# Patient Record
Sex: MALE | Race: WHITE | URBAN - METROPOLITAN AREA
[De-identification: names, ages, dates, MRNs, and addresses within clinical notes are randomized per-mention and may not be internally consistent; named-entity substitution may affect disease eponyms.]

---

## 2018-06-27 ENCOUNTER — EMERGENCY (EMERGENCY)
Facility: HOSPITAL | Age: 43
LOS: 1 days | Discharge: HOME | End: 2018-06-27
Attending: EMERGENCY MEDICINE | Admitting: EMERGENCY MEDICINE
Payer: COMMERCIAL

## 2018-06-27 VITALS
RESPIRATION RATE: 14 BRPM | OXYGEN SATURATION: 99 % | HEART RATE: 76 BPM | HEIGHT: 70 IN | WEIGHT: 179.9 LBS | SYSTOLIC BLOOD PRESSURE: 140 MMHG | TEMPERATURE: 98 F | DIASTOLIC BLOOD PRESSURE: 89 MMHG

## 2018-06-27 VITALS
HEART RATE: 75 BPM | RESPIRATION RATE: 16 BRPM | SYSTOLIC BLOOD PRESSURE: 109 MMHG | DIASTOLIC BLOOD PRESSURE: 65 MMHG | TEMPERATURE: 98 F | OXYGEN SATURATION: 97 %

## 2018-06-27 LAB
ALBUMIN SERPL ELPH-MCNC: 3.9 G/DL — SIGNIFICANT CHANGE UP (ref 3.3–5)
ALP SERPL-CCNC: 97 U/L — SIGNIFICANT CHANGE UP (ref 30–120)
ALT FLD-CCNC: 36 U/L DA — SIGNIFICANT CHANGE UP (ref 10–60)
ANION GAP SERPL CALC-SCNC: 11 MMOL/L — SIGNIFICANT CHANGE UP (ref 5–17)
APPEARANCE UR: CLEAR — SIGNIFICANT CHANGE UP
AST SERPL-CCNC: 21 U/L — SIGNIFICANT CHANGE UP (ref 10–40)
BASOPHILS # BLD AUTO: 0.03 K/UL — SIGNIFICANT CHANGE UP (ref 0–0.2)
BASOPHILS NFR BLD AUTO: 0.6 % — SIGNIFICANT CHANGE UP (ref 0–2)
BILIRUB SERPL-MCNC: 0.4 MG/DL — SIGNIFICANT CHANGE UP (ref 0.2–1.2)
BILIRUB UR-MCNC: NEGATIVE — SIGNIFICANT CHANGE UP
BUN SERPL-MCNC: 8 MG/DL — SIGNIFICANT CHANGE UP (ref 7–23)
CALCIUM SERPL-MCNC: 9.4 MG/DL — SIGNIFICANT CHANGE UP (ref 8.4–10.5)
CHLORIDE SERPL-SCNC: 101 MMOL/L — SIGNIFICANT CHANGE UP (ref 96–108)
CO2 SERPL-SCNC: 27 MMOL/L — SIGNIFICANT CHANGE UP (ref 22–31)
COLOR SPEC: YELLOW — SIGNIFICANT CHANGE UP
CREAT SERPL-MCNC: 1.2 MG/DL — SIGNIFICANT CHANGE UP (ref 0.5–1.3)
DIFF PNL FLD: ABNORMAL
EOSINOPHIL # BLD AUTO: 0.03 K/UL — SIGNIFICANT CHANGE UP (ref 0–0.5)
EOSINOPHIL NFR BLD AUTO: 0.6 % — SIGNIFICANT CHANGE UP (ref 0–6)
GLUCOSE SERPL-MCNC: 107 MG/DL — HIGH (ref 70–99)
GLUCOSE UR QL: NEGATIVE MG/DL — SIGNIFICANT CHANGE UP
HCT VFR BLD CALC: 41.8 % — SIGNIFICANT CHANGE UP (ref 39–50)
HGB BLD-MCNC: 14.9 G/DL — SIGNIFICANT CHANGE UP (ref 13–17)
IMM GRANULOCYTES NFR BLD AUTO: 0.2 % — SIGNIFICANT CHANGE UP (ref 0–1.5)
KETONES UR-MCNC: ABNORMAL
LEUKOCYTE ESTERASE UR-ACNC: ABNORMAL
LYMPHOCYTES # BLD AUTO: 1.02 K/UL — SIGNIFICANT CHANGE UP (ref 1–3.3)
LYMPHOCYTES # BLD AUTO: 18.8 % — SIGNIFICANT CHANGE UP (ref 13–44)
MCHC RBC-ENTMCNC: 29 PG — SIGNIFICANT CHANGE UP (ref 27–34)
MCHC RBC-ENTMCNC: 35.6 GM/DL — SIGNIFICANT CHANGE UP (ref 32–36)
MCV RBC AUTO: 81.5 FL — SIGNIFICANT CHANGE UP (ref 80–100)
MONOCYTES # BLD AUTO: 0.48 K/UL — SIGNIFICANT CHANGE UP (ref 0–0.9)
MONOCYTES NFR BLD AUTO: 8.8 % — SIGNIFICANT CHANGE UP (ref 2–14)
NEUTROPHILS # BLD AUTO: 3.86 K/UL — SIGNIFICANT CHANGE UP (ref 1.8–7.4)
NEUTROPHILS NFR BLD AUTO: 71 % — SIGNIFICANT CHANGE UP (ref 43–77)
NITRITE UR-MCNC: NEGATIVE — SIGNIFICANT CHANGE UP
PH UR: 6 — SIGNIFICANT CHANGE UP (ref 5–8)
PLATELET # BLD AUTO: 217 K/UL — SIGNIFICANT CHANGE UP (ref 150–400)
POTASSIUM SERPL-MCNC: 3.8 MMOL/L — SIGNIFICANT CHANGE UP (ref 3.5–5.3)
POTASSIUM SERPL-SCNC: 3.8 MMOL/L — SIGNIFICANT CHANGE UP (ref 3.5–5.3)
PROT SERPL-MCNC: 7.6 G/DL — SIGNIFICANT CHANGE UP (ref 6–8.3)
PROT UR-MCNC: 30 MG/DL
RBC # BLD: 5.13 M/UL — SIGNIFICANT CHANGE UP (ref 4.2–5.8)
RBC # FLD: 12 % — SIGNIFICANT CHANGE UP (ref 10.3–14.5)
SODIUM SERPL-SCNC: 139 MMOL/L — SIGNIFICANT CHANGE UP (ref 135–145)
SP GR SPEC: 1.02 — SIGNIFICANT CHANGE UP (ref 1.01–1.02)
UROBILINOGEN FLD QL: 1 MG/DL
WBC # BLD: 5.43 K/UL — SIGNIFICANT CHANGE UP (ref 3.8–10.5)
WBC # FLD AUTO: 5.43 K/UL — SIGNIFICANT CHANGE UP (ref 3.8–10.5)

## 2018-06-27 PROCEDURE — 87086 URINE CULTURE/COLONY COUNT: CPT

## 2018-06-27 PROCEDURE — 80053 COMPREHEN METABOLIC PANEL: CPT

## 2018-06-27 PROCEDURE — 81001 URINALYSIS AUTO W/SCOPE: CPT

## 2018-06-27 PROCEDURE — 36415 COLL VENOUS BLD VENIPUNCTURE: CPT

## 2018-06-27 PROCEDURE — 85027 COMPLETE CBC AUTOMATED: CPT

## 2018-06-27 PROCEDURE — 99284 EMERGENCY DEPT VISIT MOD MDM: CPT

## 2018-06-27 PROCEDURE — 99283 EMERGENCY DEPT VISIT LOW MDM: CPT

## 2018-06-27 RX ORDER — CEFUROXIME AXETIL 250 MG
500 TABLET ORAL EVERY 12 HOURS
Qty: 0 | Refills: 0 | Status: DISCONTINUED | OUTPATIENT
Start: 2018-06-27 | End: 2018-06-27

## 2018-06-27 RX ORDER — AZTREONAM 2 G
1 VIAL (EA) INJECTION
Qty: 20 | Refills: 0
Start: 2018-06-27 | End: 2018-07-06

## 2018-06-27 NOTE — ED PROVIDER NOTE - CARE PLAN
Principal Discharge DX:	Urinary retention Principal Discharge DX:	Urinary retention  Secondary Diagnosis:	UTI (urinary tract infection)

## 2018-06-27 NOTE — ED PROVIDER NOTE - PROGRESS NOTE DETAILS
spoke with pt about results, pt is not sure if he wants to go home with catheter, understands he may go into retention and need a billy later tonite, pt to be d/c home with bactrim follow up with urology and return if any sytmposm wrosen

## 2018-06-27 NOTE — ED PROVIDER NOTE - OBJECTIVE STATEMENT
41yo male who presents with urinary retention today. pt states since 10am he has been having difficulty urinating and burning, no hematuria, no vomiting or back pain

## 2018-06-28 ENCOUNTER — EMERGENCY (EMERGENCY)
Facility: HOSPITAL | Age: 43
LOS: 1 days | Discharge: HOME | End: 2018-06-28
Attending: EMERGENCY MEDICINE | Admitting: EMERGENCY MEDICINE
Payer: COMMERCIAL

## 2018-06-28 VITALS
WEIGHT: 179.9 LBS | HEART RATE: 94 BPM | DIASTOLIC BLOOD PRESSURE: 84 MMHG | HEIGHT: 70 IN | TEMPERATURE: 100 F | OXYGEN SATURATION: 97 % | SYSTOLIC BLOOD PRESSURE: 140 MMHG | RESPIRATION RATE: 16 BRPM

## 2018-06-28 PROCEDURE — 99283 EMERGENCY DEPT VISIT LOW MDM: CPT

## 2018-06-28 NOTE — ED ADULT NURSE NOTE - CHPI ED SYMPTOMS NEG
no nausea/no vomiting/no fever/no diarrhea/no burning urination/no blood in stool/no dysuria/no chills/no abdominal distension

## 2018-06-28 NOTE — ED PROVIDER NOTE - NS_ ATTENDINGSCRIBEDETAILS _ED_A_ED_FT
Sly Knox MD - The scribe's documentation has been prepared under my direction and personally reviewed by me in its entirety. I confirm that the note above accurately reflects all work, treatment, procedures, and medical decision making performed by me.

## 2018-06-28 NOTE — ED PROVIDER NOTE - OBJECTIVE STATEMENT
43 y/o M pt w/ PMHx bipolar 1 disorder presents to the ED c/o blood at site of catheter x today. Pt states that the catheter has been draining normally, just concerned because he noticed blood at entrance of the catheter and on the outside of the catheter. Pt denies any blood in the urine. States he went to urgent care yesterday for urinary retention, was unable to urinate at urgent care and was advised to come to [facility] for catheter insertion. Pt states he has not had any recent change in medication. Pt denies fever, n/v, pain/discomfort or any other complaints at this time.

## 2018-06-28 NOTE — ED PROVIDER NOTE - GENITOURINARY, MLM
No inguinal lymphadenopathy. No erythema or swelling. Minimal blood around billy catheter distal to tip of penis

## 2018-06-29 LAB
CULTURE RESULTS: NO GROWTH — SIGNIFICANT CHANGE UP
SPECIMEN SOURCE: SIGNIFICANT CHANGE UP

## 2018-07-03 ENCOUNTER — OUTPATIENT (OUTPATIENT)
Dept: OUTPATIENT SERVICES | Facility: HOSPITAL | Age: 43
LOS: 1 days | End: 2018-07-03
Payer: COMMERCIAL

## 2018-07-03 ENCOUNTER — APPOINTMENT (OUTPATIENT)
Dept: ULTRASOUND IMAGING | Facility: CLINIC | Age: 43
End: 2018-07-03
Payer: COMMERCIAL

## 2018-07-03 DIAGNOSIS — Z00.8 ENCOUNTER FOR OTHER GENERAL EXAMINATION: ICD-10-CM

## 2018-07-03 PROCEDURE — 76770 US EXAM ABDO BACK WALL COMP: CPT

## 2018-07-03 PROCEDURE — 76770 US EXAM ABDO BACK WALL COMP: CPT | Mod: 26

## 2019-05-17 ENCOUNTER — APPOINTMENT (OUTPATIENT)
Dept: MRI IMAGING | Facility: CLINIC | Age: 44
End: 2019-05-17
Payer: COMMERCIAL

## 2019-05-17 ENCOUNTER — OUTPATIENT (OUTPATIENT)
Dept: OUTPATIENT SERVICES | Facility: HOSPITAL | Age: 44
LOS: 1 days | End: 2019-05-17
Payer: COMMERCIAL

## 2019-05-17 DIAGNOSIS — M75.41 IMPINGEMENT SYNDROME OF RIGHT SHOULDER: ICD-10-CM

## 2019-05-17 PROBLEM — F31.9 BIPOLAR DISORDER, UNSPECIFIED: Chronic | Status: ACTIVE | Noted: 2018-06-27

## 2019-05-17 PROCEDURE — 73221 MRI JOINT UPR EXTREM W/O DYE: CPT

## 2019-05-17 PROCEDURE — 73221 MRI JOINT UPR EXTREM W/O DYE: CPT | Mod: 26,RT

## 2019-10-21 NOTE — ED ADULT NURSE NOTE - CARDIO WDL
Detail Level: Simple Detail Level: Detailed Normal rate, regular rhythm, normal S1, S2 heart sounds heard.

## 2019-11-04 ENCOUNTER — EMERGENCY (EMERGENCY)
Facility: HOSPITAL | Age: 44
LOS: 1 days | Discharge: HOME | End: 2019-11-04
Attending: EMERGENCY MEDICINE | Admitting: EMERGENCY MEDICINE
Payer: COMMERCIAL

## 2019-11-04 VITALS
HEART RATE: 104 BPM | OXYGEN SATURATION: 98 % | HEIGHT: 70 IN | DIASTOLIC BLOOD PRESSURE: 87 MMHG | WEIGHT: 184.09 LBS | TEMPERATURE: 99 F | RESPIRATION RATE: 16 BRPM | SYSTOLIC BLOOD PRESSURE: 165 MMHG

## 2019-11-04 VITALS
RESPIRATION RATE: 14 BRPM | OXYGEN SATURATION: 98 % | SYSTOLIC BLOOD PRESSURE: 146 MMHG | TEMPERATURE: 98 F | HEART RATE: 84 BPM | DIASTOLIC BLOOD PRESSURE: 91 MMHG

## 2019-11-04 LAB
ALBUMIN SERPL ELPH-MCNC: 4.3 G/DL — SIGNIFICANT CHANGE UP (ref 3.3–5)
ALP SERPL-CCNC: 88 U/L — SIGNIFICANT CHANGE UP (ref 30–120)
ALT FLD-CCNC: 31 U/L DA — SIGNIFICANT CHANGE UP (ref 10–60)
ANION GAP SERPL CALC-SCNC: 8 MMOL/L — SIGNIFICANT CHANGE UP (ref 5–17)
APPEARANCE UR: CLEAR — SIGNIFICANT CHANGE UP
APPEARANCE UR: CLEAR — SIGNIFICANT CHANGE UP
AST SERPL-CCNC: 17 U/L — SIGNIFICANT CHANGE UP (ref 10–40)
BASOPHILS # BLD AUTO: 0.02 K/UL — SIGNIFICANT CHANGE UP (ref 0–0.2)
BASOPHILS NFR BLD AUTO: 0.4 % — SIGNIFICANT CHANGE UP (ref 0–2)
BILIRUB SERPL-MCNC: 0.6 MG/DL — SIGNIFICANT CHANGE UP (ref 0.2–1.2)
BILIRUB UR-MCNC: NEGATIVE — SIGNIFICANT CHANGE UP
BILIRUB UR-MCNC: NEGATIVE — SIGNIFICANT CHANGE UP
BUN SERPL-MCNC: 10 MG/DL — SIGNIFICANT CHANGE UP (ref 7–23)
CALCIUM SERPL-MCNC: 9.6 MG/DL — SIGNIFICANT CHANGE UP (ref 8.4–10.5)
CHLORIDE SERPL-SCNC: 104 MMOL/L — SIGNIFICANT CHANGE UP (ref 96–108)
CO2 SERPL-SCNC: 27 MMOL/L — SIGNIFICANT CHANGE UP (ref 22–31)
COLOR SPEC: YELLOW — SIGNIFICANT CHANGE UP
COLOR SPEC: YELLOW — SIGNIFICANT CHANGE UP
CREAT SERPL-MCNC: 1.34 MG/DL — HIGH (ref 0.5–1.3)
DIFF PNL FLD: ABNORMAL
DIFF PNL FLD: NEGATIVE — SIGNIFICANT CHANGE UP
EOSINOPHIL # BLD AUTO: 0.01 K/UL — SIGNIFICANT CHANGE UP (ref 0–0.5)
EOSINOPHIL NFR BLD AUTO: 0.2 % — SIGNIFICANT CHANGE UP (ref 0–6)
GLUCOSE SERPL-MCNC: 99 MG/DL — SIGNIFICANT CHANGE UP (ref 70–99)
GLUCOSE UR QL: NEGATIVE MG/DL — SIGNIFICANT CHANGE UP
GLUCOSE UR QL: NEGATIVE MG/DL — SIGNIFICANT CHANGE UP
HCT VFR BLD CALC: 45.3 % — SIGNIFICANT CHANGE UP (ref 39–50)
HGB BLD-MCNC: 16.2 G/DL — SIGNIFICANT CHANGE UP (ref 13–17)
IMM GRANULOCYTES NFR BLD AUTO: 0.2 % — SIGNIFICANT CHANGE UP (ref 0–1.5)
KETONES UR-MCNC: ABNORMAL
KETONES UR-MCNC: ABNORMAL
LEUKOCYTE ESTERASE UR-ACNC: NEGATIVE — SIGNIFICANT CHANGE UP
LEUKOCYTE ESTERASE UR-ACNC: NEGATIVE — SIGNIFICANT CHANGE UP
LIDOCAIN IGE QN: 111 U/L — SIGNIFICANT CHANGE UP (ref 73–393)
LYMPHOCYTES # BLD AUTO: 0.79 K/UL — LOW (ref 1–3.3)
LYMPHOCYTES # BLD AUTO: 14.2 % — SIGNIFICANT CHANGE UP (ref 13–44)
MCHC RBC-ENTMCNC: 29.4 PG — SIGNIFICANT CHANGE UP (ref 27–34)
MCHC RBC-ENTMCNC: 35.8 GM/DL — SIGNIFICANT CHANGE UP (ref 32–36)
MCV RBC AUTO: 82.2 FL — SIGNIFICANT CHANGE UP (ref 80–100)
MONOCYTES # BLD AUTO: 0.58 K/UL — SIGNIFICANT CHANGE UP (ref 0–0.9)
MONOCYTES NFR BLD AUTO: 10.4 % — SIGNIFICANT CHANGE UP (ref 2–14)
NEUTROPHILS # BLD AUTO: 4.16 K/UL — SIGNIFICANT CHANGE UP (ref 1.8–7.4)
NEUTROPHILS NFR BLD AUTO: 74.6 % — SIGNIFICANT CHANGE UP (ref 43–77)
NITRITE UR-MCNC: NEGATIVE — SIGNIFICANT CHANGE UP
NITRITE UR-MCNC: NEGATIVE — SIGNIFICANT CHANGE UP
NRBC # BLD: 0 /100 WBCS — SIGNIFICANT CHANGE UP (ref 0–0)
PH UR: 8 — SIGNIFICANT CHANGE UP (ref 5–8)
PH UR: 9 — HIGH (ref 5–8)
PLATELET # BLD AUTO: 258 K/UL — SIGNIFICANT CHANGE UP (ref 150–400)
POTASSIUM SERPL-MCNC: 3.8 MMOL/L — SIGNIFICANT CHANGE UP (ref 3.5–5.3)
POTASSIUM SERPL-SCNC: 3.8 MMOL/L — SIGNIFICANT CHANGE UP (ref 3.5–5.3)
PROT SERPL-MCNC: 8.1 G/DL — SIGNIFICANT CHANGE UP (ref 6–8.3)
PROT UR-MCNC: 15 MG/DL
PROT UR-MCNC: 30 MG/DL
RBC # BLD: 5.51 M/UL — SIGNIFICANT CHANGE UP (ref 4.2–5.8)
RBC # FLD: 12.1 % — SIGNIFICANT CHANGE UP (ref 10.3–14.5)
SODIUM SERPL-SCNC: 139 MMOL/L — SIGNIFICANT CHANGE UP (ref 135–145)
SP GR SPEC: 1.01 — SIGNIFICANT CHANGE UP (ref 1.01–1.02)
SP GR SPEC: 1.01 — SIGNIFICANT CHANGE UP (ref 1.01–1.02)
UROBILINOGEN FLD QL: NEGATIVE MG/DL — SIGNIFICANT CHANGE UP
UROBILINOGEN FLD QL: NEGATIVE MG/DL — SIGNIFICANT CHANGE UP
WBC # BLD: 5.57 K/UL — SIGNIFICANT CHANGE UP (ref 3.8–10.5)
WBC # FLD AUTO: 5.57 K/UL — SIGNIFICANT CHANGE UP (ref 3.8–10.5)

## 2019-11-04 PROCEDURE — 81001 URINALYSIS AUTO W/SCOPE: CPT

## 2019-11-04 PROCEDURE — 99284 EMERGENCY DEPT VISIT MOD MDM: CPT | Mod: 25

## 2019-11-04 PROCEDURE — 96360 HYDRATION IV INFUSION INIT: CPT

## 2019-11-04 PROCEDURE — 83690 ASSAY OF LIPASE: CPT

## 2019-11-04 PROCEDURE — 85027 COMPLETE CBC AUTOMATED: CPT

## 2019-11-04 PROCEDURE — 99284 EMERGENCY DEPT VISIT MOD MDM: CPT

## 2019-11-04 PROCEDURE — 74177 CT ABD & PELVIS W/CONTRAST: CPT | Mod: 26

## 2019-11-04 PROCEDURE — 36415 COLL VENOUS BLD VENIPUNCTURE: CPT

## 2019-11-04 PROCEDURE — 87086 URINE CULTURE/COLONY COUNT: CPT

## 2019-11-04 PROCEDURE — 74177 CT ABD & PELVIS W/CONTRAST: CPT

## 2019-11-04 PROCEDURE — 80053 COMPREHEN METABOLIC PANEL: CPT

## 2019-11-04 RX ORDER — SODIUM CHLORIDE 9 MG/ML
1000 INJECTION INTRAMUSCULAR; INTRAVENOUS; SUBCUTANEOUS ONCE
Refills: 0 | Status: COMPLETED | OUTPATIENT
Start: 2019-11-04 | End: 2019-11-04

## 2019-11-04 RX ORDER — IOHEXOL 300 MG/ML
30 INJECTION, SOLUTION INTRAVENOUS ONCE
Refills: 0 | Status: COMPLETED | OUTPATIENT
Start: 2019-11-04 | End: 2019-11-04

## 2019-11-04 RX ORDER — LIDOCAINE HCL 20 MG/ML
5 VIAL (ML) INJECTION ONCE
Refills: 0 | Status: COMPLETED | OUTPATIENT
Start: 2019-11-04 | End: 2019-11-04

## 2019-11-04 RX ADMIN — SODIUM CHLORIDE 1000 MILLILITER(S): 9 INJECTION INTRAMUSCULAR; INTRAVENOUS; SUBCUTANEOUS at 19:00

## 2019-11-04 RX ADMIN — SODIUM CHLORIDE 1000 MILLILITER(S): 9 INJECTION INTRAMUSCULAR; INTRAVENOUS; SUBCUTANEOUS at 20:00

## 2019-11-04 RX ADMIN — IOHEXOL 30 MILLILITER(S): 300 INJECTION, SOLUTION INTRAVENOUS at 14:50

## 2019-11-04 RX ADMIN — Medication 5 MILLILITER(S): at 19:40

## 2019-11-04 RX ADMIN — SODIUM CHLORIDE 1000 MILLILITER(S): 9 INJECTION INTRAMUSCULAR; INTRAVENOUS; SUBCUTANEOUS at 14:50

## 2019-11-04 NOTE — ED ADULT NURSE REASSESSMENT NOTE - NS ED NURSE REASSESS COMMENT FT1
16 Fr Harper inserted under sterile technique, clear dacia urine noted in return, pt tolerated the procedure, Harper drained 700 ml urine and still draining. will send UA and urine c/s. will monitor.

## 2019-11-04 NOTE — ED ADULT NURSE REASSESSMENT NOTE - NS ED NURSE REASSESS COMMENT FT1
Harper connected to leg bag, pt in no distress, denies pain 1100 ml urine noted in the bag. pt instructed on Harper catheter care, Tests resulted, pt denies any complaints at this time, VSS, pt reevaluated by MD, D/C home with f/u instructions.

## 2019-11-04 NOTE — ED PROVIDER NOTE - PROGRESS NOTE DETAILS
Cliff DOWNING for ED attending, Dr. Grewal : 42 y/o male presents to the ED c/o constipation x 2 days. States it feels like something is there but nothing comes out but water, has some abd discomfort when trying to have a BM, and feels slightly bloated. Today went to urgent care and had an XR that showed ileus, had rectal exam and was told there was no stool in the vault and referred to the ED for evaluation. Denies fever, chills, abd pain n/v. No prior abd surgeries.   PE: wd, wn, NAD, PERRL, mmm, heart s1, s2, rrr, lungs cta, abd distended, nontender, no cvat, pt declining rectal exam at this time since he just had one done at urgent care, extremities, nontender, FROM, skin warm, dry no rash. Cliff DOWNING for ED attending, Dr. Grewal : 42 y/o male presents to the ED c/o constipation x 2 days. States it feels like something is there but nothing comes out but water, has some abd discomfort when trying to have a BM, and feels slightly bloated and distended. Today went to urgent care and had an XR that showed ileus, had rectal exam and was told there was no stool in the vault and referred to the ED for evaluation. Denies fever, chills, abd pain n/v. No prior abd surgeries.   PE: wd, wn, NAD, pink conjunctiva, mmm, heart s1, s2, rrr, lungs cta, abd distended, nontender, no cvat, pt declining rectal exam at this time since he just had one done at urgent care, extremities, nontender, FROM, skin warm, dry no rash. bladder scan done, noted urine over 999mL. Harper placed. Advised of ER precautions. he notes having urologist follow up. pt notes feeling well currently. bladder scan done, noted urine over 999mL. Harper placed. good urine outpatiant. Advised of ER precautions. he notes having urologist follow up.

## 2019-11-04 NOTE — ED PROVIDER NOTE - CLINICAL SUMMARY MEDICAL DECISION MAKING FREE TEXT BOX
sent from urgent care due to concern for ileus. notes diarrhea yesterday. Exam noted distended but non-tender. plan includes labs, IVF, Ct abd/pelvis.

## 2019-11-04 NOTE — ED ADULT NURSE NOTE - OBJECTIVE STATEMENT
patient has c/o constipation x 2 days, went to urgent care center and dx with ileus, denies n/v/d, abdomen is soft, pain only worse with MD mignon at bedside, will continue to monitor.

## 2019-11-04 NOTE — ED PROVIDER NOTE - NSFOLLOWUPINSTRUCTIONS_ED_ALL_ED_FT
Followup with a urologist as soon as possible. Return to ER for abdominal pain, fever, chills, nausea, vomiting, or any other complaints.

## 2019-11-04 NOTE — ED PROVIDER NOTE - CARE PROVIDER_API CALL
Elie Perry)  Urology  [address], [address]  [place] [address]  Phone: [phone]  Fax: [phone]  Follow Up Time: 1-3 Days

## 2019-11-04 NOTE — ED ADULT TRIAGE NOTE - CHIEF COMPLAINT QUOTE
" I'm constipated x 4 days, only water comes out, denies abdominal pain, nausea, vomiting, I went to Urgent Care, they told me I have ileus, my stomach is not moving "

## 2019-11-04 NOTE — ED PROVIDER NOTE - PATIENT PORTAL LINK FT
You can access the FollowMyHealth Patient Portal offered by [facility] by registering at the following website: [email or web address]. By joining [facility]’s FollowMyHealth portal, you will also be able to view your health information using other applications (apps) compatible with our system.

## 2019-11-04 NOTE — ED PROVIDER NOTE - OBJECTIVE STATEMENT
42 y/o male with PMHx of Bipolar disorder sent from urgent care due to constipation and concern for ileus. pt notes recent watery stools, LBM yesterday without blood. pt notes x-ray performed in urgent care, notes rectal exam was done in which no stool in vault. pt denies fever, chills, abdominal pain, vomiting, PSHx, chest pain, SOB, hematochezia, or any other complaints.     PCP Frank Amico  [facility]

## 2019-11-06 LAB
CULTURE RESULTS: NO GROWTH — SIGNIFICANT CHANGE UP
SPECIMEN SOURCE: SIGNIFICANT CHANGE UP

## 2020-05-07 ENCOUNTER — EMERGENCY (EMERGENCY)
Facility: HOSPITAL | Age: 45
LOS: 1 days | Discharge: PSYCHIATRIC OR SUBSTANCE USE FACILITY | End: 2020-05-07
Attending: EMERGENCY MEDICINE | Admitting: EMERGENCY MEDICINE
Payer: COMMERCIAL

## 2020-05-07 VITALS
RESPIRATION RATE: 18 BRPM | HEIGHT: 70 IN | TEMPERATURE: 100 F | DIASTOLIC BLOOD PRESSURE: 60 MMHG | HEART RATE: 98 BPM | OXYGEN SATURATION: 99 % | SYSTOLIC BLOOD PRESSURE: 145 MMHG | WEIGHT: 179.9 LBS

## 2020-05-07 DIAGNOSIS — F31.9 BIPOLAR DISORDER, UNSPECIFIED: ICD-10-CM

## 2020-05-07 DIAGNOSIS — Z98.890 OTHER SPECIFIED POSTPROCEDURAL STATES: Chronic | ICD-10-CM

## 2020-05-07 LAB
ALBUMIN SERPL ELPH-MCNC: 4.3 G/DL — SIGNIFICANT CHANGE UP (ref 3.3–5)
ALP SERPL-CCNC: 88 U/L — SIGNIFICANT CHANGE UP (ref 30–120)
ALT FLD-CCNC: 40 U/L DA — SIGNIFICANT CHANGE UP (ref 10–60)
ANION GAP SERPL CALC-SCNC: 10 MMOL/L — SIGNIFICANT CHANGE UP (ref 5–17)
APAP SERPL-MCNC: <1 UG/ML — LOW (ref 10–30)
AST SERPL-CCNC: 48 U/L — HIGH (ref 10–40)
BASOPHILS # BLD AUTO: 0.02 K/UL — SIGNIFICANT CHANGE UP (ref 0–0.2)
BASOPHILS NFR BLD AUTO: 0.2 % — SIGNIFICANT CHANGE UP (ref 0–2)
BILIRUB SERPL-MCNC: 0.6 MG/DL — SIGNIFICANT CHANGE UP (ref 0.2–1.2)
BUN SERPL-MCNC: 10 MG/DL — SIGNIFICANT CHANGE UP (ref 7–23)
CALCIUM SERPL-MCNC: 9.3 MG/DL — SIGNIFICANT CHANGE UP (ref 8.4–10.5)
CHLORIDE SERPL-SCNC: 104 MMOL/L — SIGNIFICANT CHANGE UP (ref 96–108)
CO2 SERPL-SCNC: 25 MMOL/L — SIGNIFICANT CHANGE UP (ref 22–31)
CREAT SERPL-MCNC: 1.06 MG/DL — SIGNIFICANT CHANGE UP (ref 0.5–1.3)
EOSINOPHIL # BLD AUTO: 0 K/UL — SIGNIFICANT CHANGE UP (ref 0–0.5)
EOSINOPHIL NFR BLD AUTO: 0 % — SIGNIFICANT CHANGE UP (ref 0–6)
ETHANOL SERPL-MCNC: <3 MG/DL — SIGNIFICANT CHANGE UP (ref 0–3)
GLUCOSE SERPL-MCNC: 91 MG/DL — SIGNIFICANT CHANGE UP (ref 70–99)
HCT VFR BLD CALC: 40.1 % — SIGNIFICANT CHANGE UP (ref 39–50)
HGB BLD-MCNC: 14 G/DL — SIGNIFICANT CHANGE UP (ref 13–17)
IMM GRANULOCYTES NFR BLD AUTO: 0.2 % — SIGNIFICANT CHANGE UP (ref 0–1.5)
LYMPHOCYTES # BLD AUTO: 0.53 K/UL — LOW (ref 1–3.3)
LYMPHOCYTES # BLD AUTO: 6.1 % — LOW (ref 13–44)
MCHC RBC-ENTMCNC: 29.4 PG — SIGNIFICANT CHANGE UP (ref 27–34)
MCHC RBC-ENTMCNC: 34.9 GM/DL — SIGNIFICANT CHANGE UP (ref 32–36)
MCV RBC AUTO: 84.1 FL — SIGNIFICANT CHANGE UP (ref 80–100)
MONOCYTES # BLD AUTO: 0.66 K/UL — SIGNIFICANT CHANGE UP (ref 0–0.9)
MONOCYTES NFR BLD AUTO: 7.6 % — SIGNIFICANT CHANGE UP (ref 2–14)
NEUTROPHILS # BLD AUTO: 7.41 K/UL — HIGH (ref 1.8–7.4)
NEUTROPHILS NFR BLD AUTO: 85.9 % — HIGH (ref 43–77)
NRBC # BLD: 0 /100 WBCS — SIGNIFICANT CHANGE UP (ref 0–0)
PCP SPEC-MCNC: SIGNIFICANT CHANGE UP
PLATELET # BLD AUTO: 265 K/UL — SIGNIFICANT CHANGE UP (ref 150–400)
POTASSIUM SERPL-MCNC: 3.5 MMOL/L — SIGNIFICANT CHANGE UP (ref 3.5–5.3)
POTASSIUM SERPL-SCNC: 3.5 MMOL/L — SIGNIFICANT CHANGE UP (ref 3.5–5.3)
PROT SERPL-MCNC: 8 G/DL — SIGNIFICANT CHANGE UP (ref 6–8.3)
RBC # BLD: 4.77 M/UL — SIGNIFICANT CHANGE UP (ref 4.2–5.8)
RBC # FLD: 12.5 % — SIGNIFICANT CHANGE UP (ref 10.3–14.5)
SALICYLATES SERPL-MCNC: 0.5 MG/DL — LOW (ref 2.8–20)
SODIUM SERPL-SCNC: 139 MMOL/L — SIGNIFICANT CHANGE UP (ref 135–145)
WBC # BLD: 8.64 K/UL — SIGNIFICANT CHANGE UP (ref 3.8–10.5)
WBC # FLD AUTO: 8.64 K/UL — SIGNIFICANT CHANGE UP (ref 3.8–10.5)

## 2020-05-07 PROCEDURE — 90792 PSYCH DIAG EVAL W/MED SRVCS: CPT | Mod: 95

## 2020-05-07 PROCEDURE — 99285 EMERGENCY DEPT VISIT HI MDM: CPT

## 2020-05-07 PROCEDURE — 96374 THER/PROPH/DIAG INJ IV PUSH: CPT

## 2020-05-07 PROCEDURE — 85027 COMPLETE CBC AUTOMATED: CPT

## 2020-05-07 PROCEDURE — 80175 DRUG SCREEN QUAN LAMOTRIGINE: CPT

## 2020-05-07 PROCEDURE — 93010 ELECTROCARDIOGRAM REPORT: CPT

## 2020-05-07 PROCEDURE — 36415 COLL VENOUS BLD VENIPUNCTURE: CPT

## 2020-05-07 PROCEDURE — 80053 COMPREHEN METABOLIC PANEL: CPT

## 2020-05-07 PROCEDURE — 70450 CT HEAD/BRAIN W/O DYE: CPT | Mod: 26

## 2020-05-07 PROCEDURE — 80307 DRUG TEST PRSMV CHEM ANLYZR: CPT

## 2020-05-07 PROCEDURE — 70450 CT HEAD/BRAIN W/O DYE: CPT

## 2020-05-07 PROCEDURE — 93005 ELECTROCARDIOGRAM TRACING: CPT

## 2020-05-07 PROCEDURE — 96361 HYDRATE IV INFUSION ADD-ON: CPT

## 2020-05-07 PROCEDURE — 87635 SARS-COV-2 COVID-19 AMP PRB: CPT

## 2020-05-07 PROCEDURE — 99285 EMERGENCY DEPT VISIT HI MDM: CPT | Mod: 25

## 2020-05-07 RX ORDER — SODIUM CHLORIDE 9 MG/ML
1000 INJECTION INTRAMUSCULAR; INTRAVENOUS; SUBCUTANEOUS ONCE
Refills: 0 | Status: COMPLETED | OUTPATIENT
Start: 2020-05-07 | End: 2020-05-07

## 2020-05-07 RX ADMIN — Medication 0.5 MILLIGRAM(S): at 15:11

## 2020-05-07 RX ADMIN — SODIUM CHLORIDE 1000 MILLILITER(S): 9 INJECTION INTRAMUSCULAR; INTRAVENOUS; SUBCUTANEOUS at 14:40

## 2020-05-07 RX ADMIN — SODIUM CHLORIDE 1000 MILLILITER(S): 9 INJECTION INTRAMUSCULAR; INTRAVENOUS; SUBCUTANEOUS at 15:50

## 2020-05-07 NOTE — ED ADULT NURSE NOTE - OBJECTIVE STATEMENT
Patient brought in via EMS from home. Wife called EMS per patient because he awoke feeling suicidal. Patient with prior suicide atempt in which he overdosed about 5 years ago. Today patient states he hears voices telling him to hurt himself. Patient has a plan of stepping in front of a train. Patient brought in via EMS from home. Wife called EMS per patient because he awoke feeling suicidal. Patient with prior suicide attempt in which he overdosed about 5 years ago. Today patient states he hears voices telling him to hurt himself. Patient has a plan of stepping in front of a train.

## 2020-05-07 NOTE — ED BEHAVIORAL HEALTH ASSESSMENT NOTE - SUICIDE RISK FACTORS
Current mood episode/Agitation/Severe Anxiety/Panic/Insomnia/Unable to engage in safety planning/Psychotic disorder current/past/Command hallucinations

## 2020-05-07 NOTE — ED PROVIDER NOTE - CLINICAL SUMMARY MEDICAL DECISION MAKING FREE TEXT BOX
BIBA due to suicidal ideation. pt under CO. notes he wants to jump in front of a train and voices telling him to kill himself. plan includes psych work up, TelePsych.

## 2020-05-07 NOTE — ED ADULT NURSE NOTE - ED STAT RN HANDOFF DETAILS
Constant observation maintained. Patient sleeping at present. Covid swab result pending. Patient to be transferred as a 2PC pending covid result. IV lock patent left AC. Patient endorsed to Deyanira Banks RN.

## 2020-05-07 NOTE — ED ADULT NURSE NOTE - OTHER
decreased appetite today and didn't sleep well last night Patient at times does not immediately answer, has eyes closed and takes some time to respond.

## 2020-05-07 NOTE — ED BEHAVIORAL HEALTH ASSESSMENT NOTE - CURRENT MEDICATION
lamictal 300mg AM/100mg QHS  seroquel 600mg QHS  klonopin 2mg QHS lamictal 200mg AM/100mg QHS (added in 3/2020)  seroquel 200mg AM/100 mg QHS  klonopin 2mg QHS

## 2020-05-07 NOTE — ED BEHAVIORAL HEALTH NOTE - BEHAVIORAL HEALTH NOTE
===================  PRE-HOSPITAL COURSE  ===================  SOURCE: Pre-hospital care report  DETAILS: pt was found sitting upright on front stairs of his house. Pt stated that he is hearing voices telling him to hurt himself and others. Pt stated that he is experiencing suicidal thoughts. Pt states that he believes due to lack of rest his Bipolar Disorder was exacerbated. Pt was transported to [facility] ED without incident.      ============  ED COURSE   ============  SOURCE: ED Documentation, ED RN  ARRIVAL: PT BIB EMS   BELONGINGS: Clothing, fidget spinner  BEHAVIOR: Pt was agreeable to hospital protocol including changing into a gown and having labs drawn. Pt allowed belongings to be secured, however he was upset that he wasn’t able to keep his fidget spinner. Pt stated that he planned to walk in front of a train. Pt intitially denied overdosing, but then told ED MD that he took everything in his cabinet including Tylenol. Speech has varied between being low volume and with long pauses, and episodes during which pt screams and says the devil is controlling him and that he needs morphine. Pt was given .5mg Ativan IV voluntarily. Eye contact is poor and pt frequently closed his eyes. Pt got up to ambulate to the bathroom to give a urine specimen, but then stood leaning against a wall with his eyes closed and not communicating. Pt drank water in ED.    VISITORS: None      COLLATERAL  ========================  NAME: Niecy Chavez  NUMBER: [phone], House [phone]   RELATIONSHIP: Wife  RELIABILITY: Knowledgeable about pt history and presenting issues    ========================  PATIENT DEMOGRAPHICS:  Pt is a 45yo  male, domiciled with Wife, unemployed, with a Social Security application pending (hearing not scheduled yet because of COVID pandemic), with no dependents    ========================    HPI  BASELINE FUNCTIONING: Pt is domiciled with Wife in a home that they rent. 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 Pt likes to have structure and routine and plans out his day including does meditation and yoga.  Pt sometimes has AH in which he hears music when his thoughts are racing. Pt is diagnosed with OCD and often struggles with obsessive thoughts. Pt attends [facility] Zoom group therapy once per week, has weekly phone appointments with therapist, and appointments with Psychiatrist monthly, most recently a phone appt with Psychiatrist on 5/2.   DATE HPI STARTED: 1 week ago   DECOMPENSATION: In early March pt had suicidal thoughts but denied having a plan. Pt started Lamictal and began to feel better, with decreased obsessive thoughts and improved sleep. Wife began working from home during COVID-19 pandemic and she and pt have been having verbal arguments. Patient has been having nightmares which he attributed to his sleeping medication, and so pt discontinued the medication on 5/3. Pt had been mainly avoiding listening to and reading the news because he did not want to become anxious, however last week pt caught up on COVID-19 related news and insisted on stocking canned food. Pt also insisted that Wife not go into the office or go out of the house without him. On 5/3 pt began saying that this is the best he has felt in years and everything is going right. Pt had not been practicing Anglican in recent years, and this week began praying nightly and speaking about “giving it up to a higher power,” and said after Pandemic they should be involved in the Bahai and become philanthropic. This week pt began talking repeatedly about wanting a new door, an in-ground hot-tub and a personalized license plate. Today pt switched his daily routine of exercising before showering and when wife asked why he was showering before exercising pt stated that he always does this, and appeared confused. Later Wife heard a banging sound and saw that a window was missing, and pt repeated “Emanuel Devenuti owns the house,” despite Wife reminding him several times that they rent. Today pt stated that he was feeling suicidal. Wife states that pt is not at baseline and is decompensating, and she has safety concerns.   SUICIDALITY: Today pt stated that he was feeling suicidal.   VIOLENCE: No   SUBSTANCE: None        ========================  PAST PSYCHIATRIC HISTORY  ========================  DATE PAST PSYCHIATRIC HISTORY STARTED: Around age 12  MAIN PSYCHIATRIC DIAGNOSIS: Bipolar Disorder and OCD  PSYCHIATRIC HOSPITALIZATIONS: 6-7, most recently in 2004  PRIOR ILLNESS: PT was diagnosed with Bipolar disorder and OCD as a teenager. Pt was hospitalized 6-7 times, and also spent time at a group home. Pt had a suicide attempt by overdosing on pills over 15 years ago. Pt was most recently hospitalized in 2004. Since that time pt has been taking medications and has had suicidal thoughts at times over the years, but has not decompensated to the point of requiring hospitalization.   SUICIDALITY: SA by overdosing on pills over 15 years ago  VIOLENCE: No    SUBSTANCE USE: None      ==============  OTHER HISTORY  ==============  CURRENT MEDICATION: Probiotic, Klonopin 2.5mg PM, Lamictal 200mg AM and 100mg PM, Seroquel 800mg PM, Multivitamin; pt discontinued Belsomra on 5/3 after taking it nightly for a few months due to believing it was causing nightmares   MEDICAL HISTORY: IBS   ALLERGIES: Penicillin, Peanut (gets hives), and skin “reacts” to dairy milk and pt drinks almond milk as an alternative but pt eats dairy products   LEGAL ISSUES: No  FIREARM ACCESS: No  SOCIAL HISTORY: 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 Pt is  and is domiciled with Wife.    FAMILY HISTORY: Maternal Grandfather- Depression, Brother- Depression, Brother- Anxiety   DEVELOPMENTAL HISTORY: Normal ===================  PRE-HOSPITAL COURSE  ===================  SOURCE: Pre-hospital care report  DETAILS: pt was found sitting upright on front stairs of his house. Pt stated that he is hearing voices telling him to hurt himself and others. Pt stated that he is experiencing suicidal thoughts. Pt states that he believes due to lack of rest his Bipolar Disorder was exacerbated. Pt was transported to [facility] ED without incident.      ============  ED COURSE   ============  SOURCE: ED Documentation, ED RN  ARRIVAL: PT BIB EMS   BELONGINGS: Clothing, fidget spinner  BEHAVIOR: Pt was agreeable to hospital protocol including changing into a gown and having labs drawn. Pt allowed belongings to be secured, however he was upset that he wasn’t able to keep his fidget spinner. Pt stated that he planned to walk in front of a train. Pt intitially denied overdosing, but then told ED MD that he took everything in his cabinet including Tylenol. Speech has varied between being low volume and with long pauses, and episodes during which pt screams and says the devil is controlling him and that he needs morphine. Pt was given .5mg Ativan IV voluntarily. Eye contact is poor and pt frequently closed his eyes. Pt got up to ambulate to the bathroom to give a urine specimen, but then stood leaning against a wall with his eyes closed and not communicating. Pt drank water in ED.    VISITORS: None      COLLATERAL  ========================  NAME: Niecy Chavez  NUMBER: [phone], House [phone]   RELATIONSHIP: Wife  RELIABILITY: Knowledgeable about pt history and presenting issues    ========================  PATIENT DEMOGRAPHICS:  Pt is a 43yo  male, domiciled with Wife, unemployed, with a Social Security application pending (hearing not scheduled yet because of COVID pandemic), with no dependents    ========================    HPI  BASELINE FUNCTIONING: Pt is domiciled with Wife in a home that they rent. 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 Pt likes to have structure and routine and plans out his day including does meditation and yoga.  Pt sometimes has AH in which he hears music when his thoughts are racing. Pt is diagnosed with OCD and often struggles with obsessive thoughts. Pt attends [facility] Zoom group therapy once per week, has weekly phone appointments with therapist, and appointments with Psychiatrist monthly, most recently a phone appt with Psychiatrist on 5/2.   DATE HPI STARTED: 1 week ago   DECOMPENSATION: In early March pt had suicidal thoughts but denied having a plan. Pt started Lamictal and began to feel better, with decreased obsessive thoughts and improved sleep. Wife began working from home during COVID-19 pandemic and she and pt have been having verbal arguments. Patient has been having nightmares which he attributed to his sleeping medication, and so pt discontinued the medication on 5/3. Pt had been mainly avoiding listening to and reading the news because he did not want to become anxious, however last week pt caught up on COVID-19 related news and insisted on stocking canned food. Pt also insisted that Wife not go into the office or go out of the house without him. On 5/3 pt began saying that this is the best he has felt in years and everything is going right. Pt had not been practicing Spiritism in recent years, and this week began praying nightly and speaking about “giving it up to a higher power,” and said after Pandemic they should be involved in the Worship and become philanthropic. This week pt began talking repeatedly about wanting a new door, an in-ground hot-tub and a personalized license plate. Today pt switched his daily routine of exercising before showering and when wife asked why he was showering before exercising pt stated that he always does this, and appeared confused. Later Wife heard a banging sound and saw that a window was missing, and pt repeated “Emanuel Devenuti owns the house,” despite Wife reminding him several times that they rent. Today pt stated that he was feeling suicidal. Wife states that pt is not at baseline and is decompensating, and she has safety concerns.   SUICIDALITY: Today pt stated that he was feeling suicidal.   VIOLENCE: No   SUBSTANCE: None        ========================  PAST PSYCHIATRIC HISTORY  ========================  DATE PAST PSYCHIATRIC HISTORY STARTED: Around age 12  MAIN PSYCHIATRIC DIAGNOSIS: Bipolar Disorder and OCD  PSYCHIATRIC HOSPITALIZATIONS: 6-7, most recently in 2004  PRIOR ILLNESS: PT was diagnosed with Bipolar disorder and OCD as a teenager. Pt was hospitalized 6-7 times, and also spent time at a group home. Pt had a suicide attempt by overdosing on pills over 15 years ago. Pt was most recently hospitalized in 2004. Since that time pt has been taking medications and has had suicidal thoughts at times over the years, but has not decompensated to the point of requiring hospitalization.   SUICIDALITY: SA by overdosing on pills over 15 years ago  VIOLENCE: No    SUBSTANCE USE: None      ==============  OTHER HISTORY  ==============  CURRENT MEDICATION: Probiotic, Klonopin 2.5mg PM, Lamictal 200mg AM and 100mg PM, Seroquel 800mg PM, Multivitamin; pt discontinued Belsomra on 5/3 after taking it nightly for a few months due to believing it was causing nightmares   MEDICAL HISTORY: IBS   ALLERGIES: Penicillin, Peanut (gets hives), and skin “reacts” to dairy milk and pt drinks almond milk as an alternative, however pt does eat dairy products   LEGAL ISSUES: No  FIREARM ACCESS: No  SOCIAL HISTORY: 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 Pt is  and is domiciled with Wife.    FAMILY HISTORY: Maternal Grandfather- Depression, Brother- Depression, Brother- Anxiety   DEVELOPMENTAL HISTORY: Normal

## 2020-05-07 NOTE — ED BEHAVIORAL HEALTH ASSESSMENT NOTE - DETAILS
hx of SAs in setting of [facility] from psychosis; active SI from [facility] reports the devil telling him to "kill them all" +dizzy "jenny" depression, anxiety in family spoke with wife Emergency Department attending aware

## 2020-05-07 NOTE — ED ADULT NURSE NOTE - HPI (INCLUDE ILLNESS QUALITY, SEVERITY, DURATION, TIMING, CONTEXT, MODIFYING FACTORS, ASSOCIATED SIGNS AND SYMPTOMS)
Patient with history of bipolar disorder with multiple admission in past and prior suicide attempt, overdose. Patient with history of bipolar disorder with multiple admission in past and prior suicide attempt, overdose. Presents for auditory hallucinations, voices telling him to harm himself with thoughts of stepping in front of a train.

## 2020-05-07 NOTE — ED PROVIDER NOTE - PROGRESS NOTE DETAILS
Pt had brief episode of dizziness. pt reporting to Dr. Marshall "yes" when asked if he took excess tylenol or bottle of meds. Discussed with patient spouse Niecy [phone]. pt reports patient has had intermittent depression episodes over the last five year. She reports patient has not actually followed through with a suicide attempt over the last 15 years she has known him. She reports patient has had a tough year in which he has had break downs and was fired. Notes patient hasn't been working over the last year. She reports patient doing well up until last week. notes patient delusion and seems confused. She admits that patient did accidentally get hit in the head with a dumbbell, but symptoms happened prior to injury. She admits patient did not take any excess amount of medication or overdose. She notes she has been with patient 24/7. Will add CT head due to head injury. Pending results and Telepsych. Pt had brief episode of screaming, "the devil is trying to control". pt shaking in bed and became tachycardic. pt doing well currently. discussed with dr. William from [email or web address]. Discussed with miles Swann, notes patient to be psych admission but pending COVID test for placement.

## 2020-05-07 NOTE — ED BEHAVIORAL HEALTH ASSESSMENT NOTE - OTHER PAST PSYCHIATRIC HISTORY (INCLUDE DETAILS REGARDING ONSET, COURSE OF ILLNESS, INPATIENT/OUTPATIENT TREATMENT)
prior admissions; SAs prior admissions last 2004; SAs (overdose 15 years ago)  in treatment with [facility]  last psych appointment 5/2/20

## 2020-05-07 NOTE — ED BEHAVIORAL HEALTH ASSESSMENT NOTE - OTHER
poor historian defer "up and down like a rollercoaster" chronic mental health disorder external COVID isolation

## 2020-05-07 NOTE — ED PROVIDER NOTE - ATTENDING CONTRIBUTION TO CARE
43 yo male hx of bipolar, ocd, schizoaffective BIBEMS for suicidal ideation, states he wanted to jump in front of a train, states he took lamotrigine and seroquel, states he took whole bottle, but wife says it is not true.      Gen: Alert, NAD  Head/eyes: NC/AT, PERRL  ENT: airway patent  Neck: supple, no tenderness/meningismus/JVD, Trachea midline  Pulm/lung: Bilateral clear BS, normal resp effort, no wheeze/stridor/retractions  CV/heart: RRR, no M/R/G, +2 dist pulses (radial, pedal DP/PT, popliteal)  GI/Abd: soft, NT/ND, +BS, no guarding/rebound tenderness  Musculoskeletal: no edema/erythema/cyanosis, FROM in all extremities, no C/T/L spine ttp  Skin: no rash, no vesicles, no petechaie, no ecchymosis, no swelling  Neuro: AAOx3, CN 2-12 intact, normal sensation, 5/5 motor strength in all extremities, normal gait, no dysmetria   psych:  +suicidal, flight of idea    telepsych eval, constant observation, labs, tox screen, ekg

## 2020-05-07 NOTE — ED PROVIDER NOTE - PHYSICAL EXAMINATION
Constitutional: Awake, Alert, non-toxic. NAD. Well appearing, well nourished.   HEAD: Normocephalic, atraumatic.   EYES: PERRL, EOM intact, conjunctiva and sclera are clear bilaterally. No raccoon eyes.   ENT:  No rhinorrhea, normal pharynx, patent, no tonsillar exudate or enlargement, mucous membranes pink/moist, no erythema, no drooling or stridor.   NECK: Supple, non-tender  CARDIOVASCULAR: Normal S1, S2; regular rate and rhythm.  RESPIRATORY: Normal respiratory effort; breath sounds CTAB, no wheezes, rhonchi, or rales.   ABDOMEN: Soft; non-tender, non-distended.   EXTREMITIES: Full passive and active ROM in all extremities; non-tender to palpation; distal pulses palpable and symmetric, no edema, no crepitus or step off  SKIN: Warm, dry; good skin turgor, no apparent lesions or rashes, no ecchymosis, brisk capillary refill.  NEURO: A&O x3. Sensory and motor functions are grossly intact. Speech is normal. Appearance and judgement seem appropriate for gender and age. No neurological deficits. Neurovascular sensation intact motor function 5/5 of upper and lower extremities, CN II-XII grossly intact, absent pronator drift, intact cerebellar function. Speech clear, without articulation or word-finding difficulties.

## 2020-05-07 NOTE — ED BEHAVIORAL HEALTH ASSESSMENT NOTE - HPI (INCLUDE ILLNESS QUALITY, SEVERITY, DURATION, TIMING, CONTEXT, MODIFYING FACTORS, ASSOCIATED SIGNS AND SYMPTOMS)
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    Patient on exam appears internally preoccupied but able to minimally cooperate with exam; also poor historian. States he has had Bipolar I for "45 years" and has been on his medication for "45 years". Is able to state his medications (lamictal, probiotic, seroquel, klonopin) and states "I took them all". He screams "the devil is inside of me!" Reports hearing CAH to kill himself and "kill eveyone"! Reports voices say to jump off bridge, into train or stab self. States he would act on these voices. Mood "up and down like a rollercoaster." States he has not slept. 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 Reports multiple "overdoses for 45 years." When asked about alcohol and drugs he says "bottles and bottles all day long." States he feels safe in the hospital.

## 2020-05-07 NOTE — ED ADULT NURSE NOTE - NSIMPLEMENTINTERV_GEN_ALL_ED
Implemented All Fall Risk Interventions:  [place] to call system. Call bell, personal items and telephone within reach. Instruct patient to call for assistance. Room bathroom lighting operational. Non-slip footwear when patient is off stretcher. Physically safe environment: no spills, clutter or unnecessary equipment. Stretcher in lowest position, wheels locked, appropriate side rails in place. Provide visual cue, wrist band, yellow gown, etc. Monitor gait and stability. Monitor for mental status changes and reorient to person, place, and time. Review medications for side effects contributing to fall risk. Reinforce activity limits and safety measures with patient and family.

## 2020-05-07 NOTE — ED ADULT TRIAGE NOTE - CHIEF COMPLAINT QUOTE
Patient brought in by EMS for suicidal thoughts. patient made suicidal statements to wife who called EMS. patient then made statements to EMS providers. CO initiated in triage. hx of bipolar disorder.

## 2020-05-07 NOTE — ED ADULT NURSE NOTE - SUICIDE RISK FACTORS
Family History of Suicidal behavior/Command hallucinations/Psychotic disorder current/past/Family History of psychiatric diagnoses requiring hospitalization

## 2020-05-07 NOTE — ED BEHAVIORAL HEALTH ASSESSMENT NOTE - SUMMARY
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    Patient with active psychosis and CAH to hurt self and others as he feels he is possessed by the devil. Will require admission for safety and stabilization.

## 2020-05-08 ENCOUNTER — INPATIENT (INPATIENT)
Facility: HOSPITAL | Age: 45
LOS: 10 days | Discharge: HOME | End: 2020-05-19
Attending: PSYCHIATRY & NEUROLOGY | Admitting: PSYCHIATRY & NEUROLOGY
Payer: COMMERCIAL

## 2020-05-08 VITALS — TEMPERATURE: 98 F | WEIGHT: 169.09 LBS | HEIGHT: 72 IN | RESPIRATION RATE: 18 BRPM

## 2020-05-08 VITALS
DIASTOLIC BLOOD PRESSURE: 81 MMHG | RESPIRATION RATE: 18 BRPM | OXYGEN SATURATION: 99 % | HEART RATE: 62 BPM | TEMPERATURE: 99 F | SYSTOLIC BLOOD PRESSURE: 126 MMHG

## 2020-05-08 DIAGNOSIS — Z98.890 OTHER SPECIFIED POSTPROCEDURAL STATES: Chronic | ICD-10-CM

## 2020-05-08 DIAGNOSIS — F33.9 MAJOR DEPRESSIVE DISORDER, RECURRENT, UNSPECIFIED: ICD-10-CM

## 2020-05-08 PROBLEM — K58.9 IRRITABLE BOWEL SYNDROME WITHOUT DIARRHEA: Chronic | Status: ACTIVE | Noted: 2020-05-07

## 2020-05-08 PROBLEM — K58.9 IRRITABLE BOWEL SYNDROME, UNSPECIFIED: Chronic | Status: ACTIVE | Noted: 2020-05-07

## 2020-05-08 LAB
ALBUMIN SERPL ELPH-MCNC: 4.6 G/DL — SIGNIFICANT CHANGE UP (ref 3.3–5)
ALP SERPL-CCNC: 84 U/L — SIGNIFICANT CHANGE UP (ref 40–120)
ALT FLD-CCNC: 33 U/L — SIGNIFICANT CHANGE UP (ref 4–41)
ANION GAP SERPL CALC-SCNC: 14 MMO/L — SIGNIFICANT CHANGE UP (ref 7–14)
AST SERPL-CCNC: 60 U/L — HIGH (ref 4–40)
BASOPHILS # BLD AUTO: 0.03 K/UL — SIGNIFICANT CHANGE UP (ref 0–0.2)
BASOPHILS NFR BLD AUTO: 0.5 % — SIGNIFICANT CHANGE UP (ref 0–2)
BILIRUB SERPL-MCNC: 0.7 MG/DL — SIGNIFICANT CHANGE UP (ref 0.2–1.2)
BUN SERPL-MCNC: 6 MG/DL — LOW (ref 7–23)
CALCIUM SERPL-MCNC: 9.8 MG/DL — SIGNIFICANT CHANGE UP (ref 8.4–10.5)
CHLORIDE SERPL-SCNC: 103 MMOL/L — SIGNIFICANT CHANGE UP (ref 98–107)
CO2 SERPL-SCNC: 23 MMOL/L — SIGNIFICANT CHANGE UP (ref 22–31)
CREAT SERPL-MCNC: 1.06 MG/DL — SIGNIFICANT CHANGE UP (ref 0.5–1.3)
EOSINOPHIL # BLD AUTO: 0.01 K/UL — SIGNIFICANT CHANGE UP (ref 0–0.5)
EOSINOPHIL NFR BLD AUTO: 0.2 % — SIGNIFICANT CHANGE UP (ref 0–6)
GLUCOSE SERPL-MCNC: 75 MG/DL — SIGNIFICANT CHANGE UP (ref 70–99)
HBA1C BLD-MCNC: 4.9 % — SIGNIFICANT CHANGE UP (ref 4–5.6)
HCT VFR BLD CALC: 42.7 % — SIGNIFICANT CHANGE UP (ref 39–50)
HGB BLD-MCNC: 14.7 G/DL — SIGNIFICANT CHANGE UP (ref 13–17)
IMM GRANULOCYTES NFR BLD AUTO: 0.2 % — SIGNIFICANT CHANGE UP (ref 0–1.5)
LYMPHOCYTES # BLD AUTO: 0.88 K/UL — LOW (ref 1–3.3)
LYMPHOCYTES # BLD AUTO: 15.5 % — SIGNIFICANT CHANGE UP (ref 13–44)
MCHC RBC-ENTMCNC: 28.9 PG — SIGNIFICANT CHANGE UP (ref 27–34)
MCHC RBC-ENTMCNC: 34.4 % — SIGNIFICANT CHANGE UP (ref 32–36)
MCV RBC AUTO: 83.9 FL — SIGNIFICANT CHANGE UP (ref 80–100)
MONOCYTES # BLD AUTO: 0.76 K/UL — SIGNIFICANT CHANGE UP (ref 0–0.9)
MONOCYTES NFR BLD AUTO: 13.4 % — SIGNIFICANT CHANGE UP (ref 2–14)
NEUTROPHILS # BLD AUTO: 4 K/UL — SIGNIFICANT CHANGE UP (ref 1.8–7.4)
NEUTROPHILS NFR BLD AUTO: 70.2 % — SIGNIFICANT CHANGE UP (ref 43–77)
NRBC # FLD: 0 K/UL — SIGNIFICANT CHANGE UP (ref 0–0)
PLATELET # BLD AUTO: 314 K/UL — SIGNIFICANT CHANGE UP (ref 150–400)
PMV BLD: 10.4 FL — SIGNIFICANT CHANGE UP (ref 7–13)
POTASSIUM SERPL-MCNC: 4.2 MMOL/L — SIGNIFICANT CHANGE UP (ref 3.5–5.3)
POTASSIUM SERPL-SCNC: 4.2 MMOL/L — SIGNIFICANT CHANGE UP (ref 3.5–5.3)
PROT SERPL-MCNC: 7.2 G/DL — SIGNIFICANT CHANGE UP (ref 6–8.3)
RBC # BLD: 5.09 M/UL — SIGNIFICANT CHANGE UP (ref 4.2–5.8)
RBC # FLD: 12.9 % — SIGNIFICANT CHANGE UP (ref 10.3–14.5)
SARS-COV-2 RNA SPEC QL NAA+PROBE: SIGNIFICANT CHANGE UP
SODIUM SERPL-SCNC: 140 MMOL/L — SIGNIFICANT CHANGE UP (ref 135–145)
TSH SERPL-MCNC: 0.16 UIU/ML — LOW (ref 0.27–4.2)
WBC # BLD: 5.69 K/UL — SIGNIFICANT CHANGE UP (ref 3.8–10.5)
WBC # FLD AUTO: 5.69 K/UL — SIGNIFICANT CHANGE UP (ref 3.8–10.5)

## 2020-05-08 PROCEDURE — 93010 ELECTROCARDIOGRAM REPORT: CPT

## 2020-05-08 RX ORDER — QUETIAPINE FUMARATE 200 MG/1
600 TABLET, FILM COATED ORAL AT BEDTIME
Refills: 0 | Status: DISCONTINUED | OUTPATIENT
Start: 2020-05-08 | End: 2020-05-11

## 2020-05-08 RX ORDER — LAMOTRIGINE 25 MG/1
400 TABLET, ORALLY DISINTEGRATING ORAL DAILY
Refills: 0 | Status: DISCONTINUED | OUTPATIENT
Start: 2020-05-08 | End: 2020-05-08

## 2020-05-08 RX ORDER — LAMOTRIGINE 25 MG/1
300 TABLET, ORALLY DISINTEGRATING ORAL DAILY
Refills: 0 | Status: DISCONTINUED | OUTPATIENT
Start: 2020-05-08 | End: 2020-05-11

## 2020-05-08 RX ORDER — OLANZAPINE 15 MG/1
5 TABLET, FILM COATED ORAL EVERY 6 HOURS
Refills: 0 | Status: DISCONTINUED | OUTPATIENT
Start: 2020-05-08 | End: 2020-05-11

## 2020-05-08 RX ORDER — OLANZAPINE 15 MG/1
5 TABLET, FILM COATED ORAL ONCE
Refills: 0 | Status: DISCONTINUED | OUTPATIENT
Start: 2020-05-08 | End: 2020-05-11

## 2020-05-08 RX ORDER — CLONAZEPAM 1 MG
2 TABLET ORAL AT BEDTIME
Refills: 0 | Status: DISCONTINUED | OUTPATIENT
Start: 2020-05-08 | End: 2020-05-13

## 2020-05-08 RX ADMIN — QUETIAPINE FUMARATE 600 MILLIGRAM(S): 200 TABLET, FILM COATED ORAL at 21:28

## 2020-05-08 RX ADMIN — OLANZAPINE 5 MILLIGRAM(S): 15 TABLET, FILM COATED ORAL at 11:54

## 2020-05-08 RX ADMIN — OLANZAPINE 5 MILLIGRAM(S): 15 TABLET, FILM COATED ORAL at 21:59

## 2020-05-08 RX ADMIN — Medication 2 MILLIGRAM(S): at 21:28

## 2020-05-08 NOTE — CHART NOTE - NSCHARTNOTEFT_GEN_A_CORE
Screening Medical Evaluation  Patient Admitted from: [facility] admitting diagnosis: Recurrent major depressive disorder    PAST MEDICAL & SURGICAL HISTORY:  IBS (irritable bowel syndrome)  Bipolar 1 disorder  H/O Achilles tendon repair: bilateral        Allergies    penicillins (Unknown)    Intolerances        Social History:     FAMILY HISTORY:      MEDICATIONS  (STANDING):  clonazePAM  Tablet 2 milliGRAM(s) Oral at bedtime  lamoTRIgine 300 milliGRAM(s) Oral daily  QUEtiapine 600 milliGRAM(s) Oral at bedtime    MEDICATIONS  (PRN):  OLANZapine Disintegrating Tablet 5 milliGRAM(s) Oral every 6 hours PRN wreckless or threatening behavior secondary to mood and psychotic disorder  OLANZapine Injectable 5 milliGRAM(s) IntraMuscular once PRN wreckless or threatening behavior secondary to mood and psychotic disorder      Vital Signs Last 24 Hrs  T(C): 36.8 (08 May 2020 19:25), Max: 37.2 (08 May 2020 09:12)  T(F): 98.2 (08 May 2020 19:25), Max: 99 (08 May 2020 09:12)  HR: 62 (08 May 2020 09:12) (62 - 88)  BP: 126/81 (08 May 2020 09:12) (123/89 - 133/79)  BP(mean): --  RR: 18 (08 May 2020 10:00) (18 - 19)  SpO2: 99% (08 May 2020 09:12) (98% - 99%)  CAPILLARY BLOOD GLUCOSE            PHYSICAL EXAM:  GENERAL: NAD, well-developed  HEAD:  Atraumatic, Normocephalic  EYES: EOMI, PERRLA, conjunctiva and sclera clear  NECK: Supple, No JVD  CHEST/LUNG: Clear to auscultation bilaterally; No wheeze  HEART: Regular rate and rhythm; No murmurs, rubs, or gallops  ABDOMEN: Soft, Nontender, Nondistended; Bowel sounds present  EXTREMITIES:  2+ Peripheral Pulses, No clubbing, cyanosis, or edema  PSYCH: AAOx3  NEUROLOGY: non-focal  SKIN: In tact    LABS:                        14.7   5.69  )-----------( 314      ( 08 May 2020 14:41 )             42.7     05-08    140  |  103  |  6<L>  ----------------------------<  75  4.2   |  23  |  1.06    Ca    9.8      08 May 2020 14:41    TPro  7.2  /  Alb  4.6  /  TBili  0.7  /  DBili  x   /  AST  60<H>  /  ALT  33  /  AlkPhos  84  05-08              RADIOLOGY & ADDITIONAL TESTS:    Assessment and Plan: 43 yo M with no significant PMH is admitted to [facility] with a primary psyhiatric diagnosis of Recurrent major depressive disorder. The pt currently denies having any medical complaints such as chest pain, sob, abdominal pain, n/v/d/c, or any problems with urination or bowel movements. The rest of his screening physical is unremarkable.    1.Recurrent major depressive disorder-Plan: continue with meds as per primary psychiatric team

## 2020-05-08 NOTE — ED ADULT NURSE REASSESSMENT NOTE - NS ED NURSE REASSESS COMMENT FT1
Patient became dizzy, diaphoretic and slumped against the bathroom wall while ambulating to bathroom. Patient was briefly not responsive to verbal stimuli but within seconds ambulated with assistance back to stretcher. Patient placed on cardiac monitor. Patient now admits to overdose of multiple medications this morning including his routine meds as well as tylenol and benadryl. Patient states he took the entire bottle of each medication. Dr. Marshall and ANNIKA Jacobo aware. Constant observation maintained.
Patient noted to be masturbating at times. ANNIKA Jacobo aware. Patient asked to stop the behavior and did briefly but then again began to masturbate.
Patient with agitated outburst, screaming "the devil is controlling me, I need morphine". Dr. Marshall and ANNIKA Jacobo at bedside. Patient tachycardic restless, shaking and screaming. CM  with PVCs.
incoming dayshift RN note:  pt received awake, alert, resting on stretcher, constant observation ongoing, pt currently calm and cooperative, awaiting transport to [facility].
pt belongings at nurse's station with transfer packet
received report and assumed care of pt at change of shift. pt resting at long intervals. NAD presently. pt maintained on 1:1 observation for pt safety. awaiting disposition
telephone report given to ARTEM; Jenn Polk RN
pt awake. cooperative with nursing procedures. maintained on 1:1 observation for pt safety. COVID test results = negative. MD aware and pt also informed. NAD presently. tolerated PO fluids. papers faxed to tele psych. awaiting disposition
pt maintained on 1:1 observation for patient safety. cooperative with nursing procedures presently. NAD. awaiting COVID results and disposition at this time. tolerated PO fluids

## 2020-05-08 NOTE — ED BEHAVIORAL HEALTH NOTE - BEHAVIORAL HEALTH NOTE
Updated HPI:    Patient reassessed as he waits for involuntary admission for psychiatry placement. Patient oddly related, stating he feels 'tired". When asked about his association to the Devil he says "my mind is clear" and starts reciting multiple numbers in English and Croatian. He laughs oddly to self. Denies SI/HI however not able to safety plan at this time.     MENTAL STATUS EXAM:    Mental Status Exam:  · General Appearance	Well developed  · Body Habitus	Well nourished  · Hygiene	Good  · Grooming	Good  · Behavior	Cooperative, Other  · Other	poor historian  · Eye Contact	Fair  · Relatedness	Worsening  · Impulse Control	Normal  · Muscle Tone / Strength	Normal muscle tone/strength  · Abnormal Movements	No abnormal movements  · Gait / Station	Other  · Other	defer  · Speech Volume	Normal  · Speech Rate	Normal  · Speech Spontaneity	Increased latency  · Speech Articulation	Normal  · Reported mood	Other  · Other	"up and down like a rollercoaster"  · Observed mood	Irritable  Anxious  · Affect Range	Constricted  · Affect Congruence	Not congruent  · Thought Process	Illogical/ Impaired reasoning  · Thought Associations	Normal  · Thought Content	Delusions  Suicidality  Homicidality  · Perceptions	Auditory hallucinations  · Command	Yes  · Details	"devil"  · Oriented to Time	Yes  · Oriented to Place	Yes  · Oriented to Situation	Yes  · Oriented to Person	Yes  · Attention / Concentration	Normal  · Estimated Intelligence	Average  · Recent Memory	Impaired  · Remote Memory	Normal  · Fund of Knowledge	Normal  · Language	No abnormalities noted  · Judgment (regarding everyday events)	Poor  · Insight (regarding psychiatric illness)	Poor    FORMULATION:   44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    Patient with active psychosis and CAH to hurt self and others as he feels he is possessed by the devil. Will require admission for safety and stabilization.    []maintain 1:1 monitoring  []pending COVID test for involuntary transfer for inpatient psychiatry   []for agitation can give haldol 5mg/ativan 2mg/benadryl 50mg q 6hr PRN

## 2020-05-09 LAB
CHOLEST SERPL-MCNC: 174 MG/DL — SIGNIFICANT CHANGE UP (ref 120–199)
HDLC SERPL-MCNC: 52 MG/DL — SIGNIFICANT CHANGE UP (ref 35–55)
LIPID PNL WITH DIRECT LDL SERPL: 107 MG/DL — SIGNIFICANT CHANGE UP
TRIGL SERPL-MCNC: 80 MG/DL — SIGNIFICANT CHANGE UP (ref 10–149)

## 2020-05-09 PROCEDURE — 99232 SBSQ HOSP IP/OBS MODERATE 35: CPT

## 2020-05-09 RX ORDER — IBUPROFEN 200 MG
400 TABLET ORAL EVERY 6 HOURS
Refills: 0 | Status: DISCONTINUED | OUTPATIENT
Start: 2020-05-09 | End: 2020-05-19

## 2020-05-09 RX ADMIN — Medication 2 MILLIGRAM(S): at 20:29

## 2020-05-09 RX ADMIN — OLANZAPINE 5 MILLIGRAM(S): 15 TABLET, FILM COATED ORAL at 17:54

## 2020-05-09 RX ADMIN — LAMOTRIGINE 300 MILLIGRAM(S): 25 TABLET, ORALLY DISINTEGRATING ORAL at 09:22

## 2020-05-09 RX ADMIN — QUETIAPINE FUMARATE 600 MILLIGRAM(S): 200 TABLET, FILM COATED ORAL at 20:29

## 2020-05-09 RX ADMIN — Medication 400 MILLIGRAM(S): at 13:45

## 2020-05-10 LAB — LAMOTRIGINE SERPL-MCNC: 7.3 MCG/ML — SIGNIFICANT CHANGE UP (ref 4–18)

## 2020-05-10 PROCEDURE — 99232 SBSQ HOSP IP/OBS MODERATE 35: CPT

## 2020-05-10 RX ORDER — SENNA PLUS 8.6 MG/1
2 TABLET ORAL ONCE
Refills: 0 | Status: COMPLETED | OUTPATIENT
Start: 2020-05-10 | End: 2020-05-10

## 2020-05-10 RX ADMIN — OLANZAPINE 5 MILLIGRAM(S): 15 TABLET, FILM COATED ORAL at 17:08

## 2020-05-10 RX ADMIN — LAMOTRIGINE 300 MILLIGRAM(S): 25 TABLET, ORALLY DISINTEGRATING ORAL at 08:49

## 2020-05-10 RX ADMIN — Medication 2 MILLIGRAM(S): at 19:56

## 2020-05-10 RX ADMIN — SENNA PLUS 2 TABLET(S): 8.6 TABLET ORAL at 09:03

## 2020-05-10 RX ADMIN — QUETIAPINE FUMARATE 600 MILLIGRAM(S): 200 TABLET, FILM COATED ORAL at 19:56

## 2020-05-11 PROCEDURE — 99232 SBSQ HOSP IP/OBS MODERATE 35: CPT

## 2020-05-11 RX ORDER — CHLORPROMAZINE HCL 10 MG
100 TABLET ORAL ONCE
Refills: 0 | Status: DISCONTINUED | OUTPATIENT
Start: 2020-05-11 | End: 2020-05-19

## 2020-05-11 RX ORDER — CHLORPROMAZINE HCL 10 MG
100 TABLET ORAL ONCE
Refills: 0 | Status: COMPLETED | OUTPATIENT
Start: 2020-05-11 | End: 2020-05-11

## 2020-05-11 RX ORDER — CHLORPROMAZINE HCL 10 MG
50 TABLET ORAL EVERY 6 HOURS
Refills: 0 | Status: DISCONTINUED | OUTPATIENT
Start: 2020-05-11 | End: 2020-05-19

## 2020-05-11 RX ORDER — LAMOTRIGINE 25 MG/1
350 TABLET, ORALLY DISINTEGRATING ORAL DAILY
Refills: 0 | Status: DISCONTINUED | OUTPATIENT
Start: 2020-05-12 | End: 2020-05-13

## 2020-05-11 RX ORDER — HALOPERIDOL DECANOATE 100 MG/ML
5 INJECTION INTRAMUSCULAR ONCE
Refills: 0 | Status: DISCONTINUED | OUTPATIENT
Start: 2020-05-11 | End: 2020-05-11

## 2020-05-11 RX ORDER — OLANZAPINE 15 MG/1
5 TABLET, FILM COATED ORAL ONCE
Refills: 0 | Status: COMPLETED | OUTPATIENT
Start: 2020-05-11 | End: 2020-05-11

## 2020-05-11 RX ORDER — QUETIAPINE FUMARATE 200 MG/1
700 TABLET, FILM COATED ORAL AT BEDTIME
Refills: 0 | Status: DISCONTINUED | OUTPATIENT
Start: 2020-05-11 | End: 2020-05-12

## 2020-05-11 RX ORDER — DIPHENHYDRAMINE HCL 50 MG
50 CAPSULE ORAL ONCE
Refills: 0 | Status: DISCONTINUED | OUTPATIENT
Start: 2020-05-11 | End: 2020-05-11

## 2020-05-11 RX ADMIN — Medication 100 MILLIGRAM(S): at 14:36

## 2020-05-11 RX ADMIN — OLANZAPINE 5 MILLIGRAM(S): 15 TABLET, FILM COATED ORAL at 03:15

## 2020-05-11 RX ADMIN — Medication 2 MILLIGRAM(S): at 19:48

## 2020-05-11 RX ADMIN — Medication 2 MILLIGRAM(S): at 21:23

## 2020-05-11 RX ADMIN — LAMOTRIGINE 300 MILLIGRAM(S): 25 TABLET, ORALLY DISINTEGRATING ORAL at 08:12

## 2020-05-11 RX ADMIN — QUETIAPINE FUMARATE 700 MILLIGRAM(S): 200 TABLET, FILM COATED ORAL at 19:48

## 2020-05-11 RX ADMIN — Medication 50 MILLIGRAM(S): at 19:49

## 2020-05-11 RX ADMIN — OLANZAPINE 5 MILLIGRAM(S): 15 TABLET, FILM COATED ORAL at 13:18

## 2020-05-12 PROCEDURE — 99232 SBSQ HOSP IP/OBS MODERATE 35: CPT

## 2020-05-12 RX ORDER — QUETIAPINE FUMARATE 200 MG/1
800 TABLET, FILM COATED ORAL AT BEDTIME
Refills: 0 | Status: DISCONTINUED | OUTPATIENT
Start: 2020-05-12 | End: 2020-05-19

## 2020-05-12 RX ADMIN — Medication 50 MILLIGRAM(S): at 20:53

## 2020-05-12 RX ADMIN — LAMOTRIGINE 350 MILLIGRAM(S): 25 TABLET, ORALLY DISINTEGRATING ORAL at 09:07

## 2020-05-12 RX ADMIN — Medication 50 MILLIGRAM(S): at 08:35

## 2020-05-12 RX ADMIN — QUETIAPINE FUMARATE 800 MILLIGRAM(S): 200 TABLET, FILM COATED ORAL at 20:53

## 2020-05-12 RX ADMIN — Medication 2 MILLIGRAM(S): at 20:53

## 2020-05-13 PROCEDURE — 99232 SBSQ HOSP IP/OBS MODERATE 35: CPT

## 2020-05-13 RX ORDER — LAMOTRIGINE 25 MG/1
200 TABLET, ORALLY DISINTEGRATING ORAL DAILY
Refills: 0 | Status: DISCONTINUED | OUTPATIENT
Start: 2020-05-14 | End: 2020-05-19

## 2020-05-13 RX ORDER — LAMOTRIGINE 25 MG/1
150 TABLET, ORALLY DISINTEGRATING ORAL AT BEDTIME
Refills: 0 | Status: DISCONTINUED | OUTPATIENT
Start: 2020-05-14 | End: 2020-05-15

## 2020-05-13 RX ORDER — LANOLIN ALCOHOL/MO/W.PET/CERES
5 CREAM (GRAM) TOPICAL AT BEDTIME
Refills: 0 | Status: DISCONTINUED | OUTPATIENT
Start: 2020-05-13 | End: 2020-05-18

## 2020-05-13 RX ORDER — DIPHENHYDRAMINE HCL 50 MG
50 CAPSULE ORAL ONCE
Refills: 0 | Status: COMPLETED | OUTPATIENT
Start: 2020-05-13 | End: 2020-05-13

## 2020-05-13 RX ORDER — POLYETHYLENE GLYCOL 3350 17 G/17G
17 POWDER, FOR SOLUTION ORAL DAILY
Refills: 0 | Status: DISCONTINUED | OUTPATIENT
Start: 2020-05-13 | End: 2020-05-19

## 2020-05-13 RX ORDER — CLONAZEPAM 1 MG
2 TABLET ORAL AT BEDTIME
Refills: 0 | Status: DISCONTINUED | OUTPATIENT
Start: 2020-05-13 | End: 2020-05-19

## 2020-05-13 RX ORDER — HALOPERIDOL DECANOATE 100 MG/ML
5 INJECTION INTRAMUSCULAR ONCE
Refills: 0 | Status: COMPLETED | OUTPATIENT
Start: 2020-05-13 | End: 2020-05-13

## 2020-05-13 RX ORDER — DIPHENHYDRAMINE HCL 50 MG
50 CAPSULE ORAL AT BEDTIME
Refills: 0 | Status: DISCONTINUED | OUTPATIENT
Start: 2020-05-13 | End: 2020-05-19

## 2020-05-13 RX ADMIN — Medication 50 MILLIGRAM(S): at 20:34

## 2020-05-13 RX ADMIN — Medication 2 MILLIGRAM(S): at 20:34

## 2020-05-13 RX ADMIN — HALOPERIDOL DECANOATE 5 MILLIGRAM(S): 100 INJECTION INTRAMUSCULAR at 02:27

## 2020-05-13 RX ADMIN — Medication 2 MILLIGRAM(S): at 02:27

## 2020-05-13 RX ADMIN — LAMOTRIGINE 350 MILLIGRAM(S): 25 TABLET, ORALLY DISINTEGRATING ORAL at 09:18

## 2020-05-13 RX ADMIN — Medication 5 MILLIGRAM(S): at 20:34

## 2020-05-13 RX ADMIN — QUETIAPINE FUMARATE 800 MILLIGRAM(S): 200 TABLET, FILM COATED ORAL at 20:34

## 2020-05-13 RX ADMIN — Medication 50 MILLIGRAM(S): at 10:07

## 2020-05-13 RX ADMIN — Medication 400 MILLIGRAM(S): at 02:46

## 2020-05-13 RX ADMIN — Medication 400 MILLIGRAM(S): at 10:21

## 2020-05-13 RX ADMIN — Medication 50 MILLIGRAM(S): at 02:26

## 2020-05-13 RX ADMIN — Medication 2 MILLIGRAM(S): at 10:07

## 2020-05-13 NOTE — CHART NOTE - NSCHARTNOTEFT_GEN_A_CORE
45 yo M evaluated after RN noted superficial bruise to R hand. Pt seen and evaluated in his room along with staff. Pt is a poor historian. He reports that the bruises happened about three days ago when he was rubbing his hand against the floor to get rid of the "devil." A 2cm superficial abrasion noted on R hand. No active bleeding or discharge noted. Pulses and sensation intact. Bacitracin applied. Pt advised to avoid rubbing hand at the affected area. Staff advised to continue monitoring.

## 2020-05-13 NOTE — CHART NOTE - NSREFPHYEXREFTO_GEN_ALL_CORE
-This looks like strep throat --  -- your rapid strep test was positive  -- recommend starting penicillin 500 mg by mouth twice daily x 10 days  -- in the meantime recommend symptomatic measures including increased fluids, rest, appropriate use of tylenol and ibuprofen, throat drops/lozenges, increased fluids, and licorice/mint teas.    -- follow-up if not at least notably improved in about 1 week, sooner if notably worsening or if having increased trouble with swallowing  -- please avoid contact with others as much as possible until you've been on antibiotics for at least 24 hrs and not having any fevers.     Other

## 2020-05-14 RX ADMIN — LAMOTRIGINE 150 MILLIGRAM(S): 25 TABLET, ORALLY DISINTEGRATING ORAL at 21:03

## 2020-05-14 RX ADMIN — LAMOTRIGINE 200 MILLIGRAM(S): 25 TABLET, ORALLY DISINTEGRATING ORAL at 08:01

## 2020-05-14 RX ADMIN — QUETIAPINE FUMARATE 800 MILLIGRAM(S): 200 TABLET, FILM COATED ORAL at 21:03

## 2020-05-14 RX ADMIN — Medication 2 MILLIGRAM(S): at 21:02

## 2020-05-14 RX ADMIN — Medication 5 MILLIGRAM(S): at 21:03

## 2020-05-15 PROCEDURE — 99232 SBSQ HOSP IP/OBS MODERATE 35: CPT

## 2020-05-15 RX ORDER — LAMOTRIGINE 25 MG/1
200 TABLET, ORALLY DISINTEGRATING ORAL AT BEDTIME
Refills: 0 | Status: DISCONTINUED | OUTPATIENT
Start: 2020-05-17 | End: 2020-05-19

## 2020-05-15 RX ORDER — LAMOTRIGINE 25 MG/1
150 TABLET, ORALLY DISINTEGRATING ORAL AT BEDTIME
Refills: 0 | Status: COMPLETED | OUTPATIENT
Start: 2020-05-15 | End: 2020-05-16

## 2020-05-15 RX ADMIN — Medication 50 MILLIGRAM(S): at 03:00

## 2020-05-15 RX ADMIN — QUETIAPINE FUMARATE 800 MILLIGRAM(S): 200 TABLET, FILM COATED ORAL at 20:26

## 2020-05-15 RX ADMIN — LAMOTRIGINE 200 MILLIGRAM(S): 25 TABLET, ORALLY DISINTEGRATING ORAL at 08:48

## 2020-05-15 RX ADMIN — Medication 2 MILLIGRAM(S): at 12:47

## 2020-05-15 RX ADMIN — LAMOTRIGINE 150 MILLIGRAM(S): 25 TABLET, ORALLY DISINTEGRATING ORAL at 20:26

## 2020-05-15 RX ADMIN — Medication 50 MILLIGRAM(S): at 20:59

## 2020-05-15 RX ADMIN — Medication 2 MILLIGRAM(S): at 20:26

## 2020-05-16 RX ADMIN — Medication 2 MILLIGRAM(S): at 20:32

## 2020-05-16 RX ADMIN — LAMOTRIGINE 150 MILLIGRAM(S): 25 TABLET, ORALLY DISINTEGRATING ORAL at 20:32

## 2020-05-16 RX ADMIN — QUETIAPINE FUMARATE 800 MILLIGRAM(S): 200 TABLET, FILM COATED ORAL at 20:32

## 2020-05-16 RX ADMIN — LAMOTRIGINE 200 MILLIGRAM(S): 25 TABLET, ORALLY DISINTEGRATING ORAL at 08:51

## 2020-05-16 RX ADMIN — Medication 50 MILLIGRAM(S): at 20:32

## 2020-05-17 RX ADMIN — LAMOTRIGINE 200 MILLIGRAM(S): 25 TABLET, ORALLY DISINTEGRATING ORAL at 20:36

## 2020-05-17 RX ADMIN — Medication 2 MILLIGRAM(S): at 20:36

## 2020-05-17 RX ADMIN — LAMOTRIGINE 200 MILLIGRAM(S): 25 TABLET, ORALLY DISINTEGRATING ORAL at 08:31

## 2020-05-17 RX ADMIN — QUETIAPINE FUMARATE 800 MILLIGRAM(S): 200 TABLET, FILM COATED ORAL at 20:36

## 2020-05-18 PROCEDURE — 99232 SBSQ HOSP IP/OBS MODERATE 35: CPT

## 2020-05-18 RX ORDER — LAMOTRIGINE 25 MG/1
1 TABLET, ORALLY DISINTEGRATING ORAL
Qty: 60 | Refills: 0
Start: 2020-05-18

## 2020-05-18 RX ORDER — QUETIAPINE FUMARATE 200 MG/1
2 TABLET, FILM COATED ORAL
Qty: 60 | Refills: 0
Start: 2020-05-18

## 2020-05-18 RX ORDER — CLONAZEPAM 1 MG
1 TABLET ORAL
Qty: 15 | Refills: 0
Start: 2020-05-18

## 2020-05-18 RX ORDER — BELLADONNA ALKALOIDS 30MG/100ML
1 LIQUID (ML) MISCELLANEOUS
Qty: 0 | Refills: 0 | DISCHARGE

## 2020-05-18 RX ORDER — LAMOTRIGINE 25 MG/1
300 TABLET, ORALLY DISINTEGRATING ORAL
Qty: 0 | Refills: 0 | DISCHARGE

## 2020-05-18 RX ORDER — CLONAZEPAM 1 MG
1 TABLET ORAL
Qty: 0 | Refills: 0 | DISCHARGE

## 2020-05-18 RX ORDER — LANOLIN ALCOHOL/MO/W.PET/CERES
5 CREAM (GRAM) TOPICAL AT BEDTIME
Refills: 0 | Status: DISCONTINUED | OUTPATIENT
Start: 2020-05-18 | End: 2020-05-19

## 2020-05-18 RX ORDER — LANOLIN ALCOHOL/MO/W.PET/CERES
1 CREAM (GRAM) TOPICAL
Qty: 30 | Refills: 0
Start: 2020-05-18

## 2020-05-18 RX ORDER — L.ACIDOPH/B.ANIMALIS/B.LONGUM 15B CELL
1 CAPSULE ORAL
Qty: 0 | Refills: 0 | DISCHARGE

## 2020-05-18 RX ORDER — QUETIAPINE FUMARATE 200 MG/1
600 TABLET, FILM COATED ORAL
Qty: 0 | Refills: 0 | DISCHARGE

## 2020-05-18 RX ADMIN — LAMOTRIGINE 200 MILLIGRAM(S): 25 TABLET, ORALLY DISINTEGRATING ORAL at 08:08

## 2020-05-18 RX ADMIN — LAMOTRIGINE 200 MILLIGRAM(S): 25 TABLET, ORALLY DISINTEGRATING ORAL at 20:42

## 2020-05-18 RX ADMIN — QUETIAPINE FUMARATE 800 MILLIGRAM(S): 200 TABLET, FILM COATED ORAL at 20:42

## 2020-05-18 RX ADMIN — Medication 2 MILLIGRAM(S): at 20:42

## 2020-05-19 VITALS — TEMPERATURE: 98 F

## 2020-05-19 PROCEDURE — 99238 HOSP IP/OBS DSCHRG MGMT 30/<: CPT

## 2020-05-19 RX ADMIN — LAMOTRIGINE 200 MILLIGRAM(S): 25 TABLET, ORALLY DISINTEGRATING ORAL at 08:10

## 2021-08-17 NOTE — ED PROVIDER NOTE - NSDCPRINTRESULTS_ED_ALL_ED
[Time Spent: ___ minutes] : I have spent [unfilled] minutes of time on the encounter. Patient requests all Lab and Radiology Results on their Discharge Instructions

## 2022-09-30 ENCOUNTER — EMERGENCY (EMERGENCY)
Facility: HOSPITAL | Age: 47
LOS: 1 days | Discharge: PSYCHIATRIC OR SUBSTANCE USE FACILITY | End: 2022-09-30
Attending: EMERGENCY MEDICINE | Admitting: EMERGENCY MEDICINE
Payer: MEDICARE

## 2022-09-30 VITALS
DIASTOLIC BLOOD PRESSURE: 85 MMHG | TEMPERATURE: 98 F | SYSTOLIC BLOOD PRESSURE: 130 MMHG | HEIGHT: 70 IN | HEART RATE: 104 BPM | OXYGEN SATURATION: 99 % | WEIGHT: 164.91 LBS | RESPIRATION RATE: 16 BRPM

## 2022-09-30 DIAGNOSIS — F29 UNSPECIFIED PSYCHOSIS NOT DUE TO A SUBSTANCE OR KNOWN PHYSIOLOGICAL CONDITION: ICD-10-CM

## 2022-09-30 DIAGNOSIS — Z98.890 OTHER SPECIFIED POSTPROCEDURAL STATES: Chronic | ICD-10-CM

## 2022-09-30 LAB
ALBUMIN SERPL ELPH-MCNC: 4.4 G/DL — SIGNIFICANT CHANGE UP (ref 3.3–5)
ALP SERPL-CCNC: 117 U/L — SIGNIFICANT CHANGE UP (ref 30–120)
ALT FLD-CCNC: 27 U/L DA — SIGNIFICANT CHANGE UP (ref 10–60)
ANION GAP SERPL CALC-SCNC: 10 MMOL/L — SIGNIFICANT CHANGE UP (ref 5–17)
APAP SERPL-MCNC: <1 UG/ML — LOW (ref 10–30)
AST SERPL-CCNC: 21 U/L — SIGNIFICANT CHANGE UP (ref 10–40)
BASOPHILS # BLD AUTO: 0.03 K/UL — SIGNIFICANT CHANGE UP (ref 0–0.2)
BASOPHILS NFR BLD AUTO: 0.4 % — SIGNIFICANT CHANGE UP (ref 0–2)
BILIRUB SERPL-MCNC: 1 MG/DL — SIGNIFICANT CHANGE UP (ref 0.2–1.2)
BUN SERPL-MCNC: 12 MG/DL — SIGNIFICANT CHANGE UP (ref 7–23)
CALCIUM SERPL-MCNC: 10 MG/DL — SIGNIFICANT CHANGE UP (ref 8.4–10.5)
CHLORIDE SERPL-SCNC: 102 MMOL/L — SIGNIFICANT CHANGE UP (ref 96–108)
CO2 SERPL-SCNC: 28 MMOL/L — SIGNIFICANT CHANGE UP (ref 22–31)
CREAT SERPL-MCNC: 1.1 MG/DL — SIGNIFICANT CHANGE UP (ref 0.5–1.3)
EGFR: 83 ML/MIN/1.73M2 — SIGNIFICANT CHANGE UP
EOSINOPHIL # BLD AUTO: 0 K/UL — SIGNIFICANT CHANGE UP (ref 0–0.5)
EOSINOPHIL NFR BLD AUTO: 0 % — SIGNIFICANT CHANGE UP (ref 0–6)
ETHANOL SERPL-MCNC: <3 MG/DL — SIGNIFICANT CHANGE UP (ref 0–3)
GLUCOSE SERPL-MCNC: 99 MG/DL — SIGNIFICANT CHANGE UP (ref 70–99)
HCT VFR BLD CALC: 46.4 % — SIGNIFICANT CHANGE UP (ref 39–50)
HGB BLD-MCNC: 16 G/DL — SIGNIFICANT CHANGE UP (ref 13–17)
IMM GRANULOCYTES NFR BLD AUTO: 0.1 % — SIGNIFICANT CHANGE UP (ref 0–0.9)
LYMPHOCYTES # BLD AUTO: 0.72 K/UL — LOW (ref 1–3.3)
LYMPHOCYTES # BLD AUTO: 9.8 % — LOW (ref 13–44)
MCHC RBC-ENTMCNC: 29.3 PG — SIGNIFICANT CHANGE UP (ref 27–34)
MCHC RBC-ENTMCNC: 34.5 GM/DL — SIGNIFICANT CHANGE UP (ref 32–36)
MCV RBC AUTO: 84.8 FL — SIGNIFICANT CHANGE UP (ref 80–100)
MONOCYTES # BLD AUTO: 0.64 K/UL — SIGNIFICANT CHANGE UP (ref 0–0.9)
MONOCYTES NFR BLD AUTO: 8.7 % — SIGNIFICANT CHANGE UP (ref 2–14)
NEUTROPHILS # BLD AUTO: 5.97 K/UL — SIGNIFICANT CHANGE UP (ref 1.8–7.4)
NEUTROPHILS NFR BLD AUTO: 81 % — HIGH (ref 43–77)
NRBC # BLD: 0 /100 WBCS — SIGNIFICANT CHANGE UP (ref 0–0)
PLATELET # BLD AUTO: 305 K/UL — SIGNIFICANT CHANGE UP (ref 150–400)
POTASSIUM SERPL-MCNC: 4.2 MMOL/L — SIGNIFICANT CHANGE UP (ref 3.5–5.3)
POTASSIUM SERPL-SCNC: 4.2 MMOL/L — SIGNIFICANT CHANGE UP (ref 3.5–5.3)
PROT SERPL-MCNC: 8.7 G/DL — HIGH (ref 6–8.3)
RBC # BLD: 5.47 M/UL — SIGNIFICANT CHANGE UP (ref 4.2–5.8)
RBC # FLD: 12.2 % — SIGNIFICANT CHANGE UP (ref 10.3–14.5)
SALICYLATES SERPL-MCNC: 0.3 MG/DL — LOW (ref 2.8–20)
SARS-COV-2 RNA SPEC QL NAA+PROBE: SIGNIFICANT CHANGE UP
SODIUM SERPL-SCNC: 140 MMOL/L — SIGNIFICANT CHANGE UP (ref 135–145)
WBC # BLD: 7.37 K/UL — SIGNIFICANT CHANGE UP (ref 3.8–10.5)
WBC # FLD AUTO: 7.37 K/UL — SIGNIFICANT CHANGE UP (ref 3.8–10.5)

## 2022-09-30 PROCEDURE — 93010 ELECTROCARDIOGRAM REPORT: CPT

## 2022-09-30 PROCEDURE — 90792 PSYCH DIAG EVAL W/MED SRVCS: CPT | Mod: 95

## 2022-09-30 PROCEDURE — 99285 EMERGENCY DEPT VISIT HI MDM: CPT | Mod: FS

## 2022-09-30 RX ORDER — CLONAZEPAM 1 MG
1 TABLET ORAL ONCE
Refills: 0 | Status: DISCONTINUED | OUTPATIENT
Start: 2022-09-30 | End: 2022-09-30

## 2022-09-30 RX ORDER — LAMOTRIGINE 25 MG/1
150 TABLET, ORALLY DISINTEGRATING ORAL ONCE
Refills: 0 | Status: COMPLETED | OUTPATIENT
Start: 2022-09-30 | End: 2022-09-30

## 2022-09-30 RX ORDER — ZOLPIDEM TARTRATE 10 MG/1
5 TABLET ORAL AT BEDTIME
Refills: 0 | Status: DISCONTINUED | OUTPATIENT
Start: 2022-09-30 | End: 2022-09-30

## 2022-09-30 RX ORDER — QUETIAPINE FUMARATE 200 MG/1
300 TABLET, FILM COATED ORAL ONCE
Refills: 0 | Status: COMPLETED | OUTPATIENT
Start: 2022-09-30 | End: 2022-09-30

## 2022-09-30 RX ORDER — ZALEPLON 10 MG
10 CAPSULE ORAL ONCE
Refills: 0 | Status: DISCONTINUED | OUTPATIENT
Start: 2022-09-30 | End: 2022-09-30

## 2022-09-30 RX ADMIN — ZOLPIDEM TARTRATE 5 MILLIGRAM(S): 10 TABLET ORAL at 23:14

## 2022-09-30 RX ADMIN — Medication 1 MILLIGRAM(S): at 19:39

## 2022-09-30 RX ADMIN — LAMOTRIGINE 150 MILLIGRAM(S): 25 TABLET, ORALLY DISINTEGRATING ORAL at 23:15

## 2022-09-30 RX ADMIN — Medication 1 MILLIGRAM(S): at 23:14

## 2022-09-30 RX ADMIN — QUETIAPINE FUMARATE 300 MILLIGRAM(S): 200 TABLET, FILM COATED ORAL at 23:14

## 2022-09-30 NOTE — ED BEHAVIORAL HEALTH ASSESSMENT NOTE - OTHER
Telepsych hub Unable to assess TBD "My mind hurts." Unable to assess; appears internally preoccupied

## 2022-09-30 NOTE — ED BEHAVIORAL HEALTH ASSESSMENT NOTE - PSYCHIATRIC ISSUES AND PLAN (INCLUDE STANDING AND PRN MEDICATION)
home medications: lamictal 300 mg AM, lamictal 150 mg PM, remeron 15 mg qhs, klonopin 2.5 mg qhs, seroquel 300 mg qhs prns: haldol 5 mg prn q6h, klonopin 1 mg prn q12h lamictal 300 mg AM, lamictal 150 mg PM, klonopin 2.5 mg qhs

## 2022-09-30 NOTE — ED ADULT NURSE NOTE - OBJECTIVE STATEMENT
Pt brought in by his wife for psych evaluation secondary to delusion and manic state. As per wife reported was having  " delusion of reality" like he will touch a object like a bed and will state the bed is not real. Pt also reported talking very fast and very erratic. Symptoms started about 3-4 days ago.  Pt had previous psych admission - most recent was at [place] [id number] for manic episode. Pt hx Bipolar , OCD , PTSD. No reported suicidal ot homicidal ideation. Pt is minimally verbally responsive - quiet and will tearful at times. Pt was a teacher - currently on disability status. No alcohol or recreation drug use.

## 2022-09-30 NOTE — ED ADULT TRIAGE NOTE - CHIEF COMPLAINT QUOTE
As per wife, he is manic, delusional started Tuesday, seems not in touch with reality, he is not suicidal or homicidal, he is not destructive "

## 2022-09-30 NOTE — ED BEHAVIORAL HEALTH ASSESSMENT NOTE - HPI (INCLUDE ILLNESS QUALITY, SEVERITY, DURATION, TIMING, CONTEXT, MODIFYING FACTORS, ASSOCIATED SIGNS AND SYMPTOMS)
Patient states that his mind hurts.   "I see two clocks."    Collateral -   Episode started some time around   Hypomanic over the weekend - talking fast, doing a lot othings at once (these are typical manic characteristics)  Tuesday was normal day, then at some time in the afternoon had discussion with brother that triggered anxiety and led irritabilty - saying things arene't real    Petrified, scared - calmed down with laying down, talking     Wednesday - in and out of reality all day,tried to start morning routine at home. Tried to rest. Night time, no memory. Therapist suggested he go to clinic - wanted to rest, having difficulty telling reality from fake    Thursday - didn't take night meds  psych - go to ED    Friday - 11 am - need to go to ED    lamictl  klonopin  seroquel - due to not sleeping well, wants to get off of this, question about painful night time erections > therefore wanted to get off this  remeron  as needed sleeping pill  wellbutrin    9/10 - 300 lamictal AM, 150 lamictal at night  800 seroquel and kept going down, down to  300 mg now   remeron 15 at night time  klonopin 2.5 mg at night time    wellbutrin started in july > stopped > started again around sept 25      calm, not destructive, not dangerous  numerous hospitalzation - 2020 [facility] She[facility] > looking for a new one  telehealth appt next often    therapist - every week, DBT - HJune 21    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    Patient required a great deal of redirection to start interview from ED MD and wife. Patient was able to sit calmly; however, was unable to speak with provider. Patient reported that his mind hurts though was unable to elaborate. Patient encouraged to speak about what brought him to the ED and how he was feeling; however, continued to stare away from monitor, not speaking. Appears internally preoccupied. Unable to participate in interview.    Collateral - Niecy Chavez, patient's wife  Reported patient's typical manic characteristics started over the weekend, described as "hypomanic," including talking fast, doing a lot of things at once. Throughout the week, patient's behavior wavered between being calm, fearful and irritable. On Tuesday, after speaking with his brother, patient was irritable and started having difficulty determining what was real and what was not. Also developed difficulty with memory. Patient has been less able to do things he normally does over the past few days (working on computer, playing with dog, art projects). Thursday evening, patient did not take his medications.     Wife reports that patient has had recent change in medications. Beginning 9/10, patient began taper of seroquel from 800 mg to 300 mg Wednesday night. Recently started wellbutrin 9/25. Other home meds include: lamictal 300 mg AM, lamictal 150 mg PM, remeron 15 mg qhs, klonopin 2.5 mg qhs.    Psychiatrist - Dr. Shepard  Therapist - sees weekly for DBT

## 2022-09-30 NOTE — ED PROVIDER NOTE - PROGRESS NOTE DETAILS
Spoke to telepsych, will place pt on list for consult . Pt became more agitated and received multiple rounds of meds with continued agitation. Had to use 4 point restraint for pt and staff safety. Accepted for transfer to [facility] for admission.

## 2022-09-30 NOTE — ED BEHAVIORAL HEALTH NOTE - BEHAVIORAL HEALTH NOTE
===================  PRE-HOSPITAL COURSE  ==================  SOURCE: ANNIKA Arrieta   DETAILS: bibwife for concerns related to rony.   ============  ED COURSE  ============  SOURCE: ANNIKA Arrieta   ARRIVAL:  bibwife for concerns related to rony. Pt's wife is requesting a psych eval.   BELONGINGS: Collected and secured.   BEHAVIOR:  Pt has pphx of ptsd, OCD and bipolar, has an outpatient psychiatrist,  current med regimen includes Lamictal, Remeron, Seroquel, Klonopin and Wellbutrin.   wife concerned that pt might be currently manic; pt's speech has been rapid and pressured, pt is unable to concentrate, flight of ideas with disorganized thought process; pt states that his house and the dog do not exist. Pt has a psychiatrist and this physician advised pt go to the ED for a psych eval. In the ED pt presents with flat affect,  not engaged in conversation and when pt was asked if he has any pain pt reported "yes, my mind hurts." Pt's last inpatient psych admission due to a manic episode and pt becoming destructive was in 2020 at [facility]. No current medical concerns or substance use. Pending utox results.   TREATMENT:  Pt has not required any medication or security intervention.   VISITORS: Pt's wife is at bedside and she seems supportive.   ------------------------------------------------  COVID Exposure Screen- collateral (i.e. third-party, chart review, belongings, etc; include EMS and ED staff)  ---------------------------------------------------  1. Has the patient had a COVID-19 test in the last 90 days? Unknown.  2. Has the patient tested positive for COVID-19 antibodies? Unknown.  3.Has the patient received 2 doses of the COVID-19 vaccine?  Unknown.  4. In the past 10 days, has the patient been around anyone with a positive COVID-19 test?* Unknown.  5.Has the patient been out of [place] within the past 10 days? Unknown.

## 2022-09-30 NOTE — ED PROVIDER NOTE - OBJECTIVE STATEMENT
46-year-old male with history of type I bipolar disorder, PTSD, OCD, IBS with complaint of delusions and visual behavior.  As per wife who brought patient and to ED today, states that patient has been having delusions/manic symptoms since Tuesday this week.  States that patient has been talking too fast, trying to multitask and doing too many things at once without completing thme, difficulty focusing and lack of concentration/forgetfulness.  States that patient has been having trouble with remembering days.  States that he did not think his house existed. When asked if anything if hurting or bothering him, pt replies "my mind hurts". Pt has hx of many psychiatric hospitalizations, last hospitalized in 05/2020, was seen at [facility] and transferred to [facility]. States that they spoke with pt psychiatrist, Dr. Cristhian Shepard ([phone]) this morning and was advised to come to ED. Pt not answering most questions however  denies any complaints at this time. Denies SI/HI, drug/etoh abuse.

## 2022-09-30 NOTE — ED BEHAVIORAL HEALTH ASSESSMENT NOTE - AFFECT QUALITY
- hypoxemic respiratory failure with the new diffuse bilateral perihilar lung infiltrates likely secondary to pulmonary edema .  - no pneumonia with the ct scan chest except compressive atelectasis   - bilateral pleural effusions with the chf mild to moderate   - afib with the RVR with the rate better controlled   - systolic HF with the low EF official echo pending     PLAN       - keep in negative balance with the diuretic  as tolerated by the B.P  patient currently getting IV Lasix  - rate control of afib as per the cardiology   - patient currently able to maintain the sat with  nasal canula with out the need for the bipap  -   chest x-ray noted shows persistent small effusions with associated atelectasis  -  would continue to observe if the  patient desaturates with ambulation might consider thoracentesis.  -  continue to optimise the chf management as per the cardiology Depressed

## 2022-09-30 NOTE — ED PROVIDER NOTE - CLINICAL SUMMARY MEDICAL DECISION MAKING FREE TEXT BOX
Patient with history of bipolar brought in by wife for manic symptoms and delusions.  Per wife patient has been having pressured speech, starting many activities and tasks without completing them, and thinking that his dog and his house were not actually real.  She further relates that patient has not had any violent symptoms.  Patient not answering most questions but denies any complaints.  Per wife patient has had multiple psychiatric admissions most recently May 2020.  Patient and wife spoke with his outpatient psychiatrist this morning who recommended he come to the ER for psychiatric eval.    Plan EKG labs telepsychiatry

## 2022-09-30 NOTE — ED BEHAVIORAL HEALTH ASSESSMENT NOTE - RISK ASSESSMENT
risk factors - hx of SA, internally preoccupied, recent medication changes, inabiltiy to safety plan  protective factors - supportive wife, engaged in care, engaged in therapy once per week Moderate Acute Suicide Risk risk factors - hx of SA, internally preoccupied, recent medication changes, inability to safety plan  protective factors - supportive wife, engaged in care, engaged in therapy once per week

## 2022-09-30 NOTE — ED BEHAVIORAL HEALTH ASSESSMENT NOTE - SUMMARY
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    Patient appears internally preoccupied, unable to engage in conversation. He is reported to have behavior similar to past manic episodes and unable to discern real vs. fake. Patient has also had recent medication changes, including tapering of antipsychotic and initiation of wellbutrin. Patient is unable to care for self and would benefit from inpatient hospitalization.     Plan:  - admit 9.27 for disorganized behavior, inability to care for self, psychosis  - continue home medications: lamictal 300 mg AM, lamictal 150 mg PM, remeron 15 mg qhs, klonopin 2.5 mg qhs.  PRNs  - benadryl 50 mg PRN q6h for agitation  - klonopin 1 mg PRN q12h for agitation  - haldol 5 mg PRN q6h for agitation Patient is a 46M, domiciled with wife, unemployed (previously a teacher), PMHx of IBS, PPHX of Bipolar I with psychotic features, hx of SAs in setting of psychosis, hx of inpatient admissions, remote hx of substance use, no known legal issues, brought in by wife at recommendation of psychiatrist due to disorganized behavior and concern for rony, psychiatry consulted for mental health evaluation.    Patient appears internally preoccupied, unable to engage in conversation. He is reported to have behavior similar to past manic episodes and unable to discern real vs. fake. Patient has also had recent medication changes, including tapering of antipsychotic and initiation of wellbutrin. Patient is unable to care for self and would benefit from inpatient hospitalization.     Plan:  - admit 9.27 for disorganized behavior, inability to care for self, psychosis  - continue home medications: lamictal 300 mg AM, lamictal 150 mg PM, klonopin 2.5 mg qhs  - recommend holding remeron 15 mg qhs, wellbutrin  PRNs  - benadryl 50 mg PRN q6h for agitation  - klonopin 1 mg PRN q12h for agitation  - haldol 5 mg PRN q6h for agitation

## 2022-09-30 NOTE — ED BEHAVIORAL HEALTH ASSESSMENT NOTE - DOMICILE TYPE
Private Residence Glycopyrrolate Pregnancy And Lactation Text: This medication is Pregnancy Category B and is considered safe during pregnancy. It is unknown if it is excreted breast milk.

## 2022-09-30 NOTE — ED PROVIDER NOTE - NS ED ATTENDING STATEMENT MOD
This was a shared visit with the SHAHRZAD. I reviewed and verified the documentation and independently performed the documented:

## 2022-10-01 ENCOUNTER — EMERGENCY (EMERGENCY)
Facility: HOSPITAL | Age: 47
LOS: 1 days | Discharge: HOME | End: 2022-10-01
Attending: EMERGENCY MEDICINE
Payer: MEDICARE

## 2022-10-01 ENCOUNTER — EMERGENCY (EMERGENCY)
Facility: HOSPITAL | Age: 47
LOS: 1 days | Discharge: PSYCHIATRIC OR SUBSTANCE USE FACILITY | End: 2022-10-01
Attending: EMERGENCY MEDICINE | Admitting: EMERGENCY MEDICINE
Payer: MEDICARE

## 2022-10-01 ENCOUNTER — INPATIENT (INPATIENT)
Facility: HOSPITAL | Age: 47
LOS: 5 days | Discharge: HOME | DRG: 876 | End: 2022-10-07
Attending: PSYCHIATRY & NEUROLOGY | Admitting: PSYCHIATRY & NEUROLOGY
Payer: MEDICARE

## 2022-10-01 VITALS — TEMPERATURE: 98 F | RESPIRATION RATE: 19 BRPM

## 2022-10-01 VITALS
SYSTOLIC BLOOD PRESSURE: 134 MMHG | TEMPERATURE: 99 F | WEIGHT: 175.05 LBS | OXYGEN SATURATION: 98 % | HEART RATE: 129 BPM | HEIGHT: 70 IN | DIASTOLIC BLOOD PRESSURE: 84 MMHG | RESPIRATION RATE: 16 BRPM

## 2022-10-01 VITALS
RESPIRATION RATE: 14 BRPM | TEMPERATURE: 98 F | WEIGHT: 179.9 LBS | OXYGEN SATURATION: 99 % | HEART RATE: 120 BPM | DIASTOLIC BLOOD PRESSURE: 81 MMHG | SYSTOLIC BLOOD PRESSURE: 122 MMHG | HEIGHT: 70 IN

## 2022-10-01 VITALS
OXYGEN SATURATION: 95 % | SYSTOLIC BLOOD PRESSURE: 136 MMHG | DIASTOLIC BLOOD PRESSURE: 89 MMHG | HEART RATE: 112 BPM | TEMPERATURE: 99 F | RESPIRATION RATE: 18 BRPM

## 2022-10-01 VITALS — HEART RATE: 144 BPM | OXYGEN SATURATION: 98 %

## 2022-10-01 DIAGNOSIS — Z98.890 OTHER SPECIFIED POSTPROCEDURAL STATES: Chronic | ICD-10-CM

## 2022-10-01 DIAGNOSIS — F42.9 OBSESSIVE-COMPULSIVE DISORDER, UNSPECIFIED: ICD-10-CM

## 2022-10-01 DIAGNOSIS — Z88.0 ALLERGY STATUS TO PENICILLIN: ICD-10-CM

## 2022-10-01 DIAGNOSIS — F29 UNSPECIFIED PSYCHOSIS NOT DUE TO A SUBSTANCE OR KNOWN PHYSIOLOGICAL CONDITION: ICD-10-CM
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
## 2022-10-01 DIAGNOSIS — S01.81XA LACERATION WITHOUT FOREIGN BODY OF OTHER PART OF HEAD, INITIAL ENCOUNTER: ICD-10-CM

## 2022-10-01 DIAGNOSIS — F31.9 BIPOLAR DISORDER, UNSPECIFIED: ICD-10-CM

## 2022-10-01 DIAGNOSIS — F43.10 POST-TRAUMATIC STRESS DISORDER, UNSPECIFIED: ICD-10-CM

## 2022-10-01 DIAGNOSIS — R45.1 RESTLESSNESS AND AGITATION: ICD-10-CM

## 2022-10-01 DIAGNOSIS — Z87.19 PERSONAL HISTORY OF OTHER DISEASES OF THE DIGESTIVE SYSTEM: ICD-10-CM
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
## 2022-10-01 DIAGNOSIS — Z98.890 OTHER SPECIFIED POSTPROCEDURAL STATES: ICD-10-CM

## 2022-10-01 LAB
AMPHET UR-MCNC: NEGATIVE — SIGNIFICANT CHANGE UP
APPEARANCE UR: CLEAR — SIGNIFICANT CHANGE UP
BARBITURATES UR SCN-MCNC: NEGATIVE — SIGNIFICANT CHANGE UP
BENZODIAZ UR-MCNC: POSITIVE
BILIRUB UR-MCNC: ABNORMAL
COCAINE METAB.OTHER UR-MCNC: NEGATIVE — SIGNIFICANT CHANGE UP
COLOR SPEC: ABNORMAL
DIFF PNL FLD: NEGATIVE — SIGNIFICANT CHANGE UP
EPI CELLS # UR: SIGNIFICANT CHANGE UP
GLUCOSE UR QL: NEGATIVE MG/DL — SIGNIFICANT CHANGE UP
KETONES UR-MCNC: ABNORMAL
LEUKOCYTE ESTERASE UR-ACNC: NEGATIVE — SIGNIFICANT CHANGE UP
METHADONE UR-MCNC: NEGATIVE — SIGNIFICANT CHANGE UP
NITRITE UR-MCNC: NEGATIVE — SIGNIFICANT CHANGE UP
OPIATES UR-MCNC: NEGATIVE — SIGNIFICANT CHANGE UP
PCP SPEC-MCNC: SIGNIFICANT CHANGE UP
PCP SPEC-MCNC: SIGNIFICANT CHANGE UP
PCP UR-MCNC: NEGATIVE — SIGNIFICANT CHANGE UP
PH UR: 6 — SIGNIFICANT CHANGE UP (ref 5–8)
PROT UR-MCNC: 15 MG/DL
RBC CASTS # UR COMP ASSIST: SIGNIFICANT CHANGE UP /HPF (ref 0–4)
SP GR SPEC: 1.02 — SIGNIFICANT CHANGE UP (ref 1.01–1.02)
THC UR QL: NEGATIVE — SIGNIFICANT CHANGE UP
UROBILINOGEN FLD QL: 1 MG/DL
WBC UR QL: SIGNIFICANT CHANGE UP

## 2022-10-01 PROCEDURE — U0005: CPT

## 2022-10-01 PROCEDURE — 99285 EMERGENCY DEPT VISIT HI MDM: CPT | Mod: 25

## 2022-10-01 PROCEDURE — 96374 THER/PROPH/DIAG INJ IV PUSH: CPT

## 2022-10-01 PROCEDURE — 80061 LIPID PANEL: CPT

## 2022-10-01 PROCEDURE — 85025 COMPLETE CBC W/AUTO DIFF WBC: CPT

## 2022-10-01 PROCEDURE — 96372 THER/PROPH/DIAG INJ SC/IM: CPT | Mod: XU

## 2022-10-01 PROCEDURE — 93005 ELECTROCARDIOGRAM TRACING: CPT

## 2022-10-01 PROCEDURE — 83036 HEMOGLOBIN GLYCOSYLATED A1C: CPT

## 2022-10-01 PROCEDURE — 36415 COLL VENOUS BLD VENIPUNCTURE: CPT

## 2022-10-01 PROCEDURE — 12001 RPR S/N/AX/GEN/TRNK 2.5CM/<: CPT

## 2022-10-01 PROCEDURE — 81001 URINALYSIS AUTO W/SCOPE: CPT

## 2022-10-01 PROCEDURE — 80164 ASSAY DIPROPYLACETIC ACD TOT: CPT

## 2022-10-01 PROCEDURE — 70450 CT HEAD/BRAIN W/O DYE: CPT | Mod: MA

## 2022-10-01 PROCEDURE — 99284 EMERGENCY DEPT VISIT MOD MDM: CPT

## 2022-10-01 PROCEDURE — 80307 DRUG TEST PRSMV CHEM ANLYZR: CPT

## 2022-10-01 PROCEDURE — 70450 CT HEAD/BRAIN W/O DYE: CPT | Mod: 26,MA

## 2022-10-01 PROCEDURE — 12011 RPR F/E/E/N/L/M 2.5 CM/<: CPT

## 2022-10-01 PROCEDURE — 86769 SARS-COV-2 COVID-19 ANTIBODY: CPT

## 2022-10-01 PROCEDURE — U0003: CPT

## 2022-10-01 PROCEDURE — 99223 1ST HOSP IP/OBS HIGH 75: CPT

## 2022-10-01 PROCEDURE — 90792 PSYCH DIAG EVAL W/MED SRVCS: CPT | Mod: 95

## 2022-10-01 PROCEDURE — 87635 SARS-COV-2 COVID-19 AMP PRB: CPT

## 2022-10-01 PROCEDURE — 80053 COMPREHEN METABOLIC PANEL: CPT

## 2022-10-01 PROCEDURE — 96375 TX/PRO/DX INJ NEW DRUG ADDON: CPT

## 2022-10-01 RX ORDER — MIDAZOLAM HYDROCHLORIDE 1 MG/ML
8 INJECTION, SOLUTION INTRAMUSCULAR; INTRAVENOUS ONCE
Refills: 0 | Status: DISCONTINUED | OUTPATIENT
Start: 2022-10-01 | End: 2022-10-01

## 2022-10-01 RX ORDER — DIPHENHYDRAMINE HCL 50 MG
50 CAPSULE ORAL ONCE
Refills: 0 | Status: COMPLETED | OUTPATIENT
Start: 2022-10-01 | End: 2022-10-01

## 2022-10-01 RX ORDER — HALOPERIDOL DECANOATE 100 MG/ML
5 INJECTION INTRAMUSCULAR EVERY 6 HOURS
Refills: 0 | Status: DISCONTINUED | OUTPATIENT
Start: 2022-10-01 | End: 2022-10-07

## 2022-10-01 RX ORDER — QUETIAPINE FUMARATE 200 MG/1
300 TABLET, FILM COATED ORAL AT BEDTIME
Refills: 0 | Status: DISCONTINUED | OUTPATIENT
Start: 2022-10-01 | End: 2022-10-02

## 2022-10-01 RX ORDER — LAMOTRIGINE 25 MG/1
300 TABLET, ORALLY DISINTEGRATING ORAL DAILY
Refills: 0 | Status: DISCONTINUED | OUTPATIENT
Start: 2022-10-01 | End: 2022-10-03

## 2022-10-01 RX ORDER — CLONAZEPAM 1 MG
2 TABLET ORAL AT BEDTIME
Refills: 0 | Status: DISCONTINUED | OUTPATIENT
Start: 2022-10-01 | End: 2022-10-07

## 2022-10-01 RX ORDER — HALOPERIDOL DECANOATE 100 MG/ML
5 INJECTION INTRAMUSCULAR ONCE
Refills: 0 | Status: COMPLETED | OUTPATIENT
Start: 2022-10-01 | End: 2022-10-01

## 2022-10-01 RX ORDER — LAMOTRIGINE 25 MG/1
150 TABLET, ORALLY DISINTEGRATING ORAL AT BEDTIME
Refills: 0 | Status: DISCONTINUED | OUTPATIENT
Start: 2022-10-01 | End: 2022-10-03

## 2022-10-01 RX ORDER — DIPHENHYDRAMINE HCL 50 MG
50 CAPSULE ORAL EVERY 6 HOURS
Refills: 0 | Status: DISCONTINUED | OUTPATIENT
Start: 2022-10-01 | End: 2022-10-07

## 2022-10-01 RX ORDER — CHLORPROMAZINE HCL 10 MG
50 TABLET ORAL ONCE
Refills: 0 | Status: COMPLETED | OUTPATIENT
Start: 2022-10-01 | End: 2022-10-01

## 2022-10-01 RX ADMIN — Medication 2 MILLIGRAM(S): at 17:40

## 2022-10-01 RX ADMIN — QUETIAPINE FUMARATE 300 MILLIGRAM(S): 200 TABLET, FILM COATED ORAL at 21:33

## 2022-10-01 RX ADMIN — Medication 50 MILLIGRAM(S): at 10:47

## 2022-10-01 RX ADMIN — Medication 2 MILLIGRAM(S): at 15:32

## 2022-10-01 RX ADMIN — HALOPERIDOL DECANOATE 5 MILLIGRAM(S): 100 INJECTION INTRAMUSCULAR at 15:26

## 2022-10-01 RX ADMIN — LAMOTRIGINE 150 MILLIGRAM(S): 25 TABLET, ORALLY DISINTEGRATING ORAL at 21:33

## 2022-10-01 RX ADMIN — Medication 2 MILLIGRAM(S): at 21:33

## 2022-10-01 RX ADMIN — Medication 2 MILLIGRAM(S): at 10:47

## 2022-10-01 RX ADMIN — HALOPERIDOL DECANOATE 5 MILLIGRAM(S): 100 INJECTION INTRAMUSCULAR at 10:46

## 2022-10-01 RX ADMIN — Medication 50 MILLIGRAM(S): at 18:00

## 2022-10-01 RX ADMIN — Medication 2 MILLIGRAM(S): at 18:34

## 2022-10-01 RX ADMIN — HALOPERIDOL DECANOATE 5 MILLIGRAM(S): 100 INJECTION INTRAMUSCULAR at 05:38

## 2022-10-01 RX ADMIN — MIDAZOLAM HYDROCHLORIDE 8 MILLIGRAM(S): 1 INJECTION, SOLUTION INTRAMUSCULAR; INTRAVENOUS at 16:50

## 2022-10-01 RX ADMIN — Medication 2 MILLIGRAM(S): at 05:38

## 2022-10-01 RX ADMIN — Medication 2 MILLIGRAM(S): at 08:25

## 2022-10-01 RX ADMIN — Medication 50 MILLIGRAM(S): at 15:31

## 2022-10-01 RX ADMIN — Medication 50 MILLIGRAM(S): at 07:50

## 2022-10-01 NOTE — BH INPATIENT PSYCHIATRY ASSESSMENT NOTE - NSBHASSESSSUMMFT_PSY_ALL_CORE
46yr   male with pmhx IBS, bipolar 1 disorder, h/o Achilles tendon repair  hx of inpatient admissions, remote hx of substance use, no known legal issues, brought in by wife at recommendation of psychiatrist due to disorganized behavior and rony.  Pt recently had his seroquel and lamical decreased and wellbutrin started which may have started the manic episode. Pt with high impulsivity and impaired judgement with increased mood and affect lability and increased accidental injury to self.

## 2022-10-01 NOTE — BH INPATIENT PSYCHIATRY ASSESSMENT NOTE - NSBHCHARTREVIEWVS_PSY_A_CORE FT
Vital Signs Last 24 Hrs  T(C): 36.8 (10-01-22 @ 14:46), Max: 37.2 (09-30-22 @ 19:22)  T(F): 98.3 (10-01-22 @ 14:46), Max: 98.9 (09-30-22 @ 19:22)  HR: 144 (10-01-22 @ 15:35) (89 - 144)  BP: 127/78 (10-01-22 @ 14:46) (122/81 - 174/78)  BP(mean): --  RR: 18 (10-01-22 @ 14:46) (14 - 20)  SpO2: 98% (10-01-22 @ 15:35) (95% - 99%)     Vital Signs Last 24 Hrs  T(C): 37.2 (10-01-22 @ 18:09), Max: 37.2 (10-01-22 @ 18:09)  T(F): 98.9 (10-01-22 @ 18:09), Max: 98.9 (10-01-22 @ 18:09)  HR: 129 (10-01-22 @ 18:09) (89 - 144)  BP: 134/84 (10-01-22 @ 18:09) (122/81 - 174/78)  BP(mean): --  RR: 16 (10-01-22 @ 18:09) (14 - 20)  SpO2: 98% (10-01-22 @ 18:09) (95% - 99%)    Orthostatic VS  10-01-22 @ 17:40  Lying BP: 130/84 HR: 100  Sitting BP: --/-- HR: --  Standing BP: --/-- HR: --  Site: --  Mode: --

## 2022-10-01 NOTE — ED BEHAVIORAL HEALTH ASSESSMENT NOTE - RISK ASSESSMENT
Moderate Acute Suicide Risk risk factors - hx of SA, internally preoccupied, recent medication changes, inability to safety plan  protective factors - supportive wife, engaged in care, engaged in therapy once per week

## 2022-10-01 NOTE — ED ADULT NURSE NOTE - NSIMPLEMENTINTERV_GEN_ALL_ED
Implemented All Fall with Harm Risk Interventions:  [place] to call system. Call bell, personal items and telephone within reach. Instruct patient to call for assistance. Room bathroom lighting operational. Non-slip footwear when patient is off stretcher. Physically safe environment: no spills, clutter or unnecessary equipment. Stretcher in lowest position, wheels locked, appropriate side rails in place. Provide visual cue, wrist band, yellow gown, etc. Monitor gait and stability. Monitor for mental status changes and reorient to person, place, and time. Review medications for side effects contributing to fall risk. Reinforce activity limits and safety measures with patient and family. Provide visual clues: red socks.

## 2022-10-01 NOTE — ED ADULT NURSE NOTE - NS ED PATIENT SAFETY CONCERN
A&O, daily VS, hypotensive, MD aware, no changes. Independent in room. Nicotine patch on right arm. Redirectable today, calm. No IV access.      Unable to assess due to medical condition

## 2022-10-01 NOTE — ED PROVIDER NOTE - OBJECTIVE STATEMENT
Can we please get records from [facility].    47 yo M with PMHx of IBS, bipolar 1 disorder, h/o Achilles tendon repair presents to ED c/o agitation. Was seen in ER yesterday for psychotic break. this morning transferred to [facility] for psych admission sent back by ambulance when [facility] decided pt was too sedated and too  agitated. Pt unable to give any hx. Pt appears calm and sedated at this time.

## 2022-10-01 NOTE — BH INPATIENT PSYCHIATRY ASSESSMENT NOTE - RISK ASSESSMENT
risk factors - hx of SA, internally preoccupied, recent medication changes, inability to safety plan  protective factors - supportive wife, engaged in care, engaged in therapy once per week

## 2022-10-01 NOTE — BH INPATIENT PSYCHIATRY ASSESSMENT NOTE - CURRENT MEDICATION
MEDICATIONS  (STANDING):  chlorproMAZINE    Injectable 50 milliGRAM(s) IntraMuscular once  clonazePAM  Tablet 2 milliGRAM(s) Oral at bedtime  lamoTRIgine 150 milliGRAM(s) Oral at bedtime  lamoTRIgine 300 milliGRAM(s) Oral daily  QUEtiapine 300 milliGRAM(s) Oral at bedtime    MEDICATIONS  (PRN):  diphenhydrAMINE Injectable 50 milliGRAM(s) IntraMuscular every 6 hours PRN Agitation  haloperidol    Injectable 5 milliGRAM(s) IntraMuscular every 6 hours PRN Agitation  LORazepam   Injectable 2 milliGRAM(s) IntraMuscular every 4 hours PRN Agitation   MEDICATIONS  (STANDING):  clonazePAM  Tablet 2 milliGRAM(s) Oral at bedtime  lamoTRIgine 150 milliGRAM(s) Oral at bedtime  lamoTRIgine 300 milliGRAM(s) Oral daily  QUEtiapine 300 milliGRAM(s) Oral at bedtime    MEDICATIONS  (PRN):  diphenhydrAMINE Injectable 50 milliGRAM(s) IntraMuscular every 6 hours PRN Agitation  haloperidol    Injectable 5 milliGRAM(s) IntraMuscular every 6 hours PRN Agitation  LORazepam   Injectable 2 milliGRAM(s) IntraMuscular every 4 hours PRN Agitation

## 2022-10-01 NOTE — ED BEHAVIORAL HEALTH ASSESSMENT NOTE - HPI (INCLUDE ILLNESS QUALITY, SEVERITY, DURATION, TIMING, CONTEXT, MODIFYING FACTORS, ASSOCIATED SIGNS AND SYMPTOMS)
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    Patient required a great deal of redirection to start interview from ED MD and wife. Patient was able to sit calmly; however, was unable to speak with provider. Patient reported that his mind hurts though was unable to elaborate. Patient encouraged to speak about what brought him to the ED and how he was feeling; however, continued to stare away from monitor, not speaking. Appears internally preoccupied. Unable to participate in interview.    Collateral - Niecy Chavez, patient's wife  Reported patient's typical manic characteristics started over the weekend, described as "hypomanic," including talking fast, doing a lot of things at once. Throughout the week, patient's behavior wavered between being calm, fearful and irritable. On Tuesday, after speaking with his brother, patient was irritable and started having difficulty determining what was real and what was not. Also developed difficulty with memory. Patient has been less able to do things he normally does over the past few days (working on computer, playing with dog, art projects). Thursday evening, patient did not take his medications.     Wife reports that patient has had recent change in medications. Beginning 9/10, patient began taper of seroquel from 800 mg to 300 mg Wednesday night. Recently started wellbutrin 9/25. Other home meds include: lamictal 300 mg AM, lamictal 150 mg PM, remeron 15 mg qhs, klonopin 2.5 mg qhs.    Psychiatrist - Dr. Shepard  Therapist - sees weekly for DBT    Update 10/1/22: Patient accepted to [facility] overnight and was set for transport in the morning. Patient required IM medication due to agitation prior to transfer. This provider received call from [facility] reporting they don't feel comfortable admitting patient because he was "unresponsvive" and "shaking". Patient was sent back to [place].    On evaluation 2nd time, patient alert, but internally preoccupied. Continues to present with labile affect. Continues to request "for someone to help me." His sole complain remains that his "mind hurts."    Patient remains unable to care for himself and in need of inpatient psychiatric admission.

## 2022-10-01 NOTE — ED ADULT NURSE NOTE - CHIEF COMPLAINT QUOTE
Pt. to the ED BIB Nursing Staff from [address] [place] [address] in patient Unit- As per Staff heard patient banging head against room radiator--+ Head Lac- Unknown LOC- NA Called by Dr. Duckworth -- Patient medicated --by Nursing staff PTA-- CO in place for safety

## 2022-10-01 NOTE — BH PATIENT PROFILE - HOME MEDICATIONS
Wellbutrin , 150 milligram(s) orally once a day  Sonata 10 mg oral capsule , 1 cap(s) orally once a day (at bedtime), As Needed  Remeron 15 mg oral tablet , 1 tab(s) orally once a day (at bedtime)  QUEtiapine 300 mg oral tablet , 1 tab(s) orally once a day (at bedtime)  lamoTRIgine 300 mg oral tablet, extended release , 1 tab(s) orally once a day  lamoTRIgine 150 mg oral tablet , 1 tab(s) orally once a day (at bedtime)  clonazePAM 2 mg oral tablet , 1 tab(s) orally once a day (at bedtime) MDD:2mg

## 2022-10-01 NOTE — ED PROVIDER NOTE - PROGRESS NOTE DETAILS
pt received 2mg ativan and 50mg thoarzine prior to arrival in ED. pt , per psych, hardly talks. MAEx4 in ED spontaneously, not to command

## 2022-10-01 NOTE — BH INPATIENT PSYCHIATRY ASSESSMENT NOTE - NSBHMETABOLIC_PSY_ALL_CORE_FT
BMI: BMI (kg/m2): 25.8 (10-01-22 @ 13:15)  HbA1c:   Glucose:   BP: --  Lipid Panel:  BMI: BMI (kg/m2): 25.1 (10-01-22 @ 18:09)  HbA1c:   Glucose:   BP: --  Lipid Panel:

## 2022-10-01 NOTE — ED PROVIDER NOTE - OBJECTIVE STATEMENT
45 yo M from [facility] with pmhx IBS, bipolar 1 disorder, h/o Achilles tendon repair presents to ED c/o agitation. Admitted to [facility] for Psychosis.  Came to ED s/p agitated episode where he was banging his head on a radiator. +laceration. Pt was given sedation prior to arrival to ED. pt calm and cooperative in ED. Not on OACs.

## 2022-10-01 NOTE — ED ADULT NURSE NOTE - CHIEF COMPLAINT QUOTE
sent back from [facility] Transfer for agitation, given Versed 10 mg IM by EMS for agitation/rocking whole body while on EMS stretcher and 4 pt restraints

## 2022-10-01 NOTE — BH PATIENT PROFILE - FALL HARM RISK - UNIVERSAL INTERVENTIONS
Bed in lowest position, wheels locked, appropriate side rails in place/Call bell, personal items and telephone in reach/Instruct patient to call for assistance before getting out of bed or chair/Non-slip footwear when patient is out of bed/[place] to call system/Physically safe environment - no spills, clutter or unnecessary equipment/Purposeful Proactive Rounding/Room/bathroom lighting operational, light cord in reach

## 2022-10-01 NOTE — BH INPATIENT PSYCHIATRY ASSESSMENT NOTE - SUICIDALITY
History of Present Illness  Cm Summers is a 63 y.o. female who presents today for management of    Chief Complaint   Patient presents with    Foot Pain     right    Medication Evaluation     gabapentin     Joint Pain     fingers     Nasal Polyps     fexofenadine      Osteoarthritis and Chronic Pain:  Patient has arthralgias, primarily affecting the knees and hands. Symptoms onset: problem is longstanding. Rheumatological ROS: ongoing significant pain in knees, hands and feet which is stable and controlled by PRN meds. Response to treatment plan: waxing and waning. Diabetes Mellitus:  She has diabetes mellitus, and  hyperlipidemia and obesity. Diabetic ROS - medication compliance: compliant all of the time, diabetic diet compliance: compliant most of the time, home glucose monitoring: fasting values range 149-159, nonfasting values range 150-170s, further diabetic ROS: no polyuria or polydipsia, no chest pain, dyspnea or TIA's, no numbness, tingling or pain in extremities. Lab review: orders written for new lab studies as appropriate; see orders.      Problem List  Patient Active Problem List    Diagnosis Date Noted    Type 2 diabetes mellitus with diabetic neuropathy ([address] 10/24/2019    Vitamin B12 deficiency 04/25/2019    Bipolar 1 disorder, manic, full remission ([address] 04/25/2019    PTSD (post-traumatic stress disorder) 04/25/2019    Hypothyroidism due to acquired atrophy of thyroid 05/02/2016    Left low back pain 04/13/2016    History of alcohol abuse 04/13/2016    Lumbar degenerative disc disease 04/13/2016    Recurrent depression ([address] 06/12/2013    Tear of meniscus of left knee 01/16/2013    Hypercholesteremia 01/02/2013    Type 2 diabetes mellitus with microalbuminuria, with long-term current use of insulin ([address] 01/02/2013       Past Medical History  Past Medical History:   Diagnosis Date    Arthritis     Chronic pain     Depression     Diabetes ([address]     Hyperlipidemia  Hypothyroid     Medial meniscus tear     Left knee    Psychiatric disorder     Tobacco abuse, in remission         Surgical History  Past Surgical History:   Procedure Laterality Date    HX ORTHOPAEDIC      Hand reconstructive surgery    HX TUBAL LIGATION  2004        Current Medications  Current Outpatient Medications   Medication Sig    fexofenadine (ALLEGRA) 180 mg tablet Take 1 Tab by mouth daily as needed for Allergies.  SITagliptin-metFORMIN (JANUMET) 50-1,000 mg per tablet TAKE 1 TABLET BY MOUTH  TWICE A DAY    insulin glargine (LANTUS,BASAGLAR) 100 unit/mL (3 mL) inpn 40 Units by SubCUTAneous route nightly.  levothyroxine (SYNTHROID) 137 mcg tablet TAKE 1 TABLET BY MOUTH  DAILY  (BEFORE BREAKFAST)    rosuvastatin (CRESTOR) 20 mg tablet Take 1 Tab by mouth nightly.  gabapentin (NEURONTIN) 100 mg capsule Take 1 Cap by mouth nightly. Max Daily Amount: 100 mg.    glucose blood VI test strips (ONETOUCH ULTRA BLUE TEST STRIP) strip Test blood glucose 4 times a day    Insulin Needles, Disposable, (BD ULTRA-FINE MINI PEN NEEDLE) 31 gauge x 3/16\" ndle Use 4 times daily    oxybutynin chloride XL (DITROPAN XL) 10 mg CR tablet Take 1 Tab by mouth daily.  insulin lispro (HUMALOG KWIKPEN INSULIN) 100 unit/mL kwikpen Administer  6- 8 units three times daily before each meal per sliding scale    omega-3 acid ethyl esters (LOVAZA) 1 gram capsule Take 2 Caps by mouth two (2) times daily (with meals).  haloperidol (HALDOL) 5 mg tablet Take 5 mg by mouth two (2) times a day.  ibuprofen (MOTRIN) 800 mg tablet Take 1 Tab by mouth every eight (8) hours as needed for Pain.  melatonin tab tablet Take 5 mg by mouth nightly.  cyanocobalamin (VITAMIN B-12) 500 mcg tablet Take 500 mcg by mouth daily.  loxapine (LOXITANE) 25 mg capsule Take 25 mg by mouth nightly.  ferrous sulfate 325 mg (65 mg iron) tablet Take 1 Tab by mouth two (2) times a day.     benztropine (COGENTIN) 0.5 mg tablet Take 0.5 mg by mouth two (2) times a day.  sertraline (ZOLOFT) 100 mg tablet Take 200 mg by mouth daily. No current facility-administered medications for this visit.         Allergies/Drug Reactions  Allergies   Allergen Reactions    Lipitor [Atorvastatin] Other (comments)     myalgias    Lisinopril Cough    Aspirin Other (comments)     Nose bleeds    Zocor [Simvastatin] Other (comments)     Causes pain in the left leg        Family History  Family History   Problem Relation Age of Onset    Alcohol abuse Mother     Arthritis-osteo Mother    Lucian Negus Mother 61        breast cancer    Diabetes Mother     Hypertension Mother     Psychiatric Disorder Mother    Debbie Lama Stroke Mother     Breast Cancer Mother     Alcohol abuse Father     Heart Disease Father     Diabetes Father     Hypertension Father     Lung Disease Father     Colon Cancer Father 72    Alcohol abuse Paternal Grandmother     Ovarian Cancer Sister     Alcohol abuse Sister     Diabetes Sister     Psychiatric Disorder Sister     Alcohol abuse Brother     Psychiatric Disorder Son     Psychiatric Disorder Other         nephew        Social History  Social History     Socioeconomic History    Marital status:      Spouse name: Not on file    Number of children: Not on file    Years of education: Not on file    Highest education level: Not on file   Occupational History    Not on file   Social Needs    Financial resource strain: Not on file    Food insecurity:     Worry: Not on file     Inability: Not on file    Transportation needs:     Medical: Not on file     Non-medical: Not on file   Tobacco Use    Smoking status: Former Smoker     Packs/day: 1.00     Years: 35.00     Pack years: 35.00     Types: Cigarettes     Last attempt to quit: 2011     Years since quittin.2    Smokeless tobacco: Never Used    Tobacco comment: quit    Substance and Sexual Activity    Alcohol use: No     Alcohol/week: 0.0 standard drinks     Comment: quit 10 years ago    Drug use: No    Sexual activity: Yes     Partners: Male   Lifestyle    Physical activity:     Days per week: Not on file     Minutes per session: Not on file    Stress: Not on file   Relationships    Social connections:     Talks on phone: Not on file     Gets together: Not on file     Attends Mosque service: Not on file     Active member of club or organization: Not on file     Attends meetings of clubs or organizations: Not on file     Relationship status: Not on file    Intimate partner violence:     Fear of current or ex partner: Not on file     Emotionally abused: Not on file     Physically abused: Not on file     Forced sexual activity: Not on file   Other Topics Concern     Service No    Blood Transfusions No    Caffeine Concern No    Occupational Exposure No    Hobby Hazards Not Asked    Sleep Concern Yes     Comment: takes sleeping pills    Stress Concern Not Asked    Weight Concern Yes    Special Diet Not Asked    Back Care Not Asked    Exercise Yes    Bike Helmet Not Asked    Seat Belt Yes    Self-Exams Yes   Social History Narrative    Not on file       Review of Systems  Review of Systems   Constitutional: Negative. HENT: Negative. Respiratory: Negative. Cardiovascular: Negative. Gastrointestinal: Negative. Genitourinary: Negative. Musculoskeletal: Positive for joint pain. Neurological: Negative.           Physical Exam  Vital signs:   Vitals:    01/31/20 1022   BP: 131/78   Pulse: 78   Resp: 18   Temp: 97 °F (36.1 °C)   TempSrc: Oral   SpO2: 99%   Weight: 190 lb 12.8 oz (86.5 kg)   Height: 5' 4\" (1.626 m)       General: alert, oriented, not in distress  Eyes: clear conjunctivae, anicteric sclerae, full and equal ROMs  Chest/Lungs: clear breath sounds, no wheezing or crackles  Heart: normal rate, regular rhythm, no murmur  Extremities: no focal deformities, no edema  Neuro: AAOx3, CN's grossly intact  Skin: no visible abnormalities  Foot exam: no open cuts, ulcers or wounds. DP full and equal. Sensation intact decreased on the right foot with 5.07 g monofilament wire, normal om the left. Laboratory/Tests:  Component      Latest Ref Rng & Units 10/24/2019 10/24/2019 10/24/2019          10:27 AM 10:27 AM 10:27 AM   Sodium      136 - 145 mmol/L   134 (L)   Potassium      3.5 - 5.5 mmol/L   4.2   Chloride      100 - 111 mmol/L   99 (L)   CO2      21 - 32 mmol/L   29   Anion gap      3.0 - 18 mmol/L   6   Glucose      74 - 99 mg/dL   135 (H)   BUN      7.0 - 18 MG/DL   12   Creatinine      0.6 - 1.3 MG/DL   0.89   BUN/Creatinine ratio      12 - 20     13   GFR est AA      >60 ml/min/1.73m2   >60   GFR est non-AA      >60 ml/min/1.73m2   >60   Calcium      8.5 - 10.1 MG/DL   10.1   TSH      0.36 - 3.74 uIU/mL  3.57    Vitamin B12      211 - 911 pg/mL 476         Assessment/Plan:      1. Type 2 diabetes mellitus with microalbuminuria, with long-term current use of insulin ([facility])  - needs better control, goal HbA1c <7%  - AMB POC HEMOGLOBIN A1C 7.3%  - LIPID PANEL; Future  - CBC WITH AUTOMATED DIFF; Future  - METABOLIC PANEL, COMPREHENSIVE; Future  - continue Lantus and Janumet  - adjusted Humalog sliding scale    2. Type 2 diabetes mellitus with diabetic neuropathy, with long-term current use of insulin ([facility])  -  DIABETES FOOT EXAM  - increase gabapentin (NEURONTIN) 300 mg capsule; Take 1 Cap by mouth nightly. Max Daily Amount: 300 mg. Dispense: 90 Cap; Refill: 2    3. Hypothyroidism due to acquired atrophy of thyroid  - stable    4. Hypercholesteremia  - tolerating lower dose of Crestor 20mg QHS  - LIPID PANEL; Future  - METABOLIC PANEL, COMPREHENSIVE; Future    5. Recurrent depression ([facility])  - stable    6. Bipolar 1 disorder, manic, full remission ([facility])  - stable    7. Non-seasonal allergic rhinitis, unspecified trigger  - fexofenadine (ALLEGRA) 180 mg tablet; Take 1 Tab by mouth daily as needed for Allergies. Dispense: 90 Tab; Refill: 3    8. Old tear of medial meniscus of left knee, unspecified tear type  - gabapentin (NEURONTIN) 300 mg capsule; Take 1 Cap by mouth nightly. Max Daily Amount: 300 mg. Dispense: 90 Cap; Refill: 2  - Tylenol or NSAID as needed    Follow-up and Dispositions    · Return in about 3 months (around 4/30/2020) for DM, HTN, HLD. I have discussed the diagnosis with the patient and the intended plan as seen in the above orders. The patient has received an after-visit summary and questions were answered concerning future plans. I have discussed medication side effects and warnings with the patient as well. I have reviewed the plan of care with the patient, accepted their input and they are in agreement with the treatment goals.        Myles Hair MD  January 31, 2020 Yes

## 2022-10-01 NOTE — ED ADULT NURSE REASSESSMENT NOTE - NS ED NURSE REASSESS COMMENT FT1
pt woke up, became agitated, stood up from stretcher, unable to redirect him back to stretcher, not cooperative, code gray called, pt helped back to stretcher by ED staff and security, 4 point leather restraints initiated. constant observation ongoing, cardiac monitoring resumed. pt medicated as ordered. will continue to monitor.

## 2022-10-01 NOTE — ED ADULT NURSE NOTE - OBJECTIVE STATEMENT
Pt. to the ED BIB Nursing Staff from [address] [place] [address] in patient Unit- As per Staff heard patient banging head against room radiator--+ Head Lac- Unknown LOC- NA Called by Dr. Duckworth -- Patient medicated --by Nursing staff PTA-- CO in place for safety

## 2022-10-01 NOTE — ED PROVIDER NOTE - CLINICAL SUMMARY MEDICAL DECISION MAKING FREE TEXT BOX
46 M with bipolar, psychosis. sent back from [facility] for reevaluation. Plan: will ask telepsych to reevaluate for further guidance.

## 2022-10-01 NOTE — ED BEHAVIORAL HEALTH ASSESSMENT NOTE - SUMMARY
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    Patient appears internally preoccupied, unable to engage in conversation. He is reported to have behavior similar to past manic episodes and unable to discern real vs. fake. Patient has also had recent medication changes, including tapering of antipsychotic and initiation of wellbutrin. Patient is unable to care for self and would benefit from inpatient hospitalization.    10/1/22 update: Patient rejected from [facility], accepted by  inpatient psychiatric unit after. Patient remains internally preoccupied, requesting help, affective dysregulation and lability, irritable, unable to care for himself 2/2 to active rony with psychosis.    Plan:  - admit 9.27 for disorganized behavior, inability to care for self, psychosis  - continue home medications: lamictal 300 mg AM, lamictal 150 mg PM, klonopin 2.5 mg qhs  - recommend holding remeron 15 mg qhs, wellbutrin  PRNs  - benadryl 50 mg PRN q6h for agitation  - klonopin 1 mg PRN q12h for agitation  - haldol 5 mg PRN q6h for agitation

## 2022-10-01 NOTE — ED ADULT TRIAGE NOTE - CHIEF COMPLAINT QUOTE
sent back from [facility] Transfer for agitation, given Versed 10 mg IM by EMS for agitation/rocking whole body while on EMS stretcher and 4 pt restraints

## 2022-10-01 NOTE — BH INPATIENT PSYCHIATRY ASSESSMENT NOTE - HPI (INCLUDE ILLNESS QUALITY, SEVERITY, DURATION, TIMING, CONTEXT, MODIFYING FACTORS, ASSOCIATED SIGNS AND SYMPTOMS)
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    Patient required a great deal of redirection to start interview from ED MD and wife. Patient was able to sit calmly; however, was unable to speak with provider. Patient reported that his mind hurts though was unable to elaborate. Patient encouraged to speak about what brought him to the ED and how he was feeling; however, continued to stare away from monitor, not speaking. Appears internally preoccupied. Unable to participate in interview.    Collateral - Niecy Chavez, patient's wife  Reported patient's typical manic characteristics started over the weekend, described as "hypomanic," including talking fast, doing a lot of things at once. Throughout the week, patient's behavior wavered between being calm, fearful and irritable. On Tuesday, after speaking with his brother, patient was irritable and started having difficulty determining what was real and what was not. Also developed difficulty with memory. Patient has been less able to do things he normally does over the past few days (working on computer, playing with dog, art projects). Thursday evening, patient did not take his medications.     Wife reports that patient has had recent change in medications. Beginning 9/10, patient began taper of seroquel from 800 mg to 300 mg Wednesday night. Recently started wellbutrin 9/25. Other home meds include: lamictal 300 mg AM, lamictal 150 mg PM, remeron 15 mg qhs, klonopin 2.5 mg qhs.    Psychiatrist - Dr. Shepard  Therapist - sees weekly for DBT

## 2022-10-01 NOTE — ED PROVIDER NOTE - PATIENT PORTAL LINK FT
You can access the FollowMyHealth Patient Portal offered by [facility] by registering at the following website: [email or web address]. By joining [facility]’s FollowMyHealth portal, you will also be able to view your health information using other applications (apps) compatible with our system.

## 2022-10-01 NOTE — ED ADULT TRIAGE NOTE - CHIEF COMPLAINT QUOTE
Pt. to the ED BRAULIO Nursing Staff from [address] [place] [address] in patient Unit- As per Staff heard patient banging head against room radiator--+ Head Lac- Unknown LOC- NA Called by Dr. Duckworth -- Patient medicated by Nursing staff PTA Pt. to the ED BIB Nursing Staff from [address] [place] [address] in patient Unit- As per Staff heard patient banging head against room radiator--+ Head Lac- Unknown LOC- NA Called by Dr. Duckworth -- Patient medicated --by Nursing staff PTA-- CO in place for safety

## 2022-10-01 NOTE — ED PROVIDER NOTE - PHYSICAL EXAMINATION
Constitutional: NAD AOx3  Eyes: PERRL EOMI  Head: Normocephalic atraumatic, abrasion and laceration to left upper forehead in hairline  Mouth: MMM  Cardiac: regular rate and rhythm  Resp: Lungs CTAB  GI: Abd s/nd/nt  Neuro: CN2-12 grossly intact, TADEO x 4  Skin: No visible rashes Constitutional: NAD   Eyes: PERRL EOMI  Head: Normocephalic atraumatic, abrasion and laceration to left upper forehead in hairline approx 2cm long  Mouth: MMM  Cardiac: regular rate and rhythm  Resp: Lungs CTAB  GI: Abd s/nd/nt  Neuro: CN2-12 grossly intact, TADEO x 4  Skin: No visible rashes

## 2022-10-01 NOTE — ED ADULT NURSE REASSESSMENT NOTE - NS ED NURSE REASSESS COMMENT FT1
Pt seen and evaluated by the Tele psych
pt continues with odd behavior. poor eye contact was made, speech in very low level and  monotone. pt had to be strongly encouraged to follow nursing procedures, like taking medications, stay in room. pt did take PO medication that was offered. continues on 1:1 observation for pt safety. wife remains at bedside
pt's wife remains at bedside. informed of delays. will keep her updated
tele psych updated on pt's ED course of stay. pt pacing in room. walking in circles. not answering staff. maintained on 1:1 observation for pt safety
wife at bedside presently; was able to help staff convince pt to take his night time medications. laying on bed at this time. maintained on 1:1 observation for pt safety
wife aware of transfer to [facility], wife expressing concern regarding [facility], requesting to talk to psych, called tele psych and number given for call back
Pt seen on stretcher . Maintained of constant observation. awaiting for psych evaluation
received report and assumed care of pt at change of shift. pt awake and alert. minimal eye contact. minimal verbalizations. needs constant redirection, for example; to sit while vs are taken, to go back to room after using the bathroom. wife at bedside and has helped to re-direction pt at times. pt maintained on 1:1 observation for pt safety. will continue to monitor

## 2022-10-01 NOTE — BH CHART NOTE - NSEVENTNOTEFT_PSY_ALL_CORE
[place] banging noise in pt's room , came in to see and pt found kneeling in front on the radiator . Pt was continuing to be yelling incomprehensibly and pounding on  the window sill and radiator. Noted that pt was actively bleeding , blood running down the right forehead. Asked pt what happened , did he hit his head , pt nodded. Asked him how he hit his head and pt slapped his hand on the radiator. . Pt seen by rapid response and they wanted ct scan , pt down to ED.  Spoke with Dr Jordan Hughes that pt had a negative ct scan of the head and 2 stitches were placed . She reported that the pt  received ativan 2 mg as he was still agitated while in the ED . Pt was deemed medically cleared and is be  to return to the unit.

## 2022-10-01 NOTE — ED ADULT NURSE NOTE - OBJECTIVE STATEMENT
45 y/o male brought back to ED from [facility] by EMS reportedly due to agitation that they weren't able to manage. EMS corroborates that patient was non compliant and combative at times, given versed IM by EMS. constant observation initiated, placed on cardiac monitor due to sedation. pt is not answering questions, does not follow verbal commands.

## 2022-10-01 NOTE — ED PROCEDURE NOTE - DEPTH OF REPAIR FOR WOUND CLOSURE
October 14, 2021      Sadia Rider  [address]  [place] [address]        Dear jennifer,        Sorry for the delay. I have sent a new Rx for lexapro 5 mg. Should start 1/2 tab for 4-6 days, then increase to a full tab. We could take it up further after a few weeks if she feels like she needs it. The most common side effects are GI upset (indigestion or nausea) and dry mouth, but those symptoms usually resolve after a couple weeks. She may feel woozy for the first couple days as well, but it should not be severe. We should touch base in another 4-6 wks or sooner if not tolerating it. MyChart msg would be fine   skin

## 2022-10-02 PROCEDURE — 99221 1ST HOSP IP/OBS SF/LOW 40: CPT

## 2022-10-02 PROCEDURE — 99232 SBSQ HOSP IP/OBS MODERATE 35: CPT

## 2022-10-02 RX ORDER — VALPROIC ACID (AS SODIUM SALT) 250 MG/5ML
500 SOLUTION, ORAL ORAL AT BEDTIME
Refills: 0 | Status: DISCONTINUED | OUTPATIENT
Start: 2022-10-02 | End: 2022-10-07

## 2022-10-02 RX ORDER — CHLORPROMAZINE HCL 10 MG
50 TABLET ORAL ONCE
Refills: 0 | Status: COMPLETED | OUTPATIENT
Start: 2022-10-02 | End: 2022-10-02

## 2022-10-02 RX ORDER — QUETIAPINE FUMARATE 200 MG/1
200 TABLET, FILM COATED ORAL ONCE
Refills: 0 | Status: COMPLETED | OUTPATIENT
Start: 2022-10-02 | End: 2022-10-03

## 2022-10-02 RX ORDER — QUETIAPINE FUMARATE 200 MG/1
200 TABLET, FILM COATED ORAL DAILY
Refills: 0 | Status: DISCONTINUED | OUTPATIENT
Start: 2022-10-03 | End: 2022-10-07

## 2022-10-02 RX ORDER — QUETIAPINE FUMARATE 200 MG/1
400 TABLET, FILM COATED ORAL AT BEDTIME
Refills: 0 | Status: DISCONTINUED | OUTPATIENT
Start: 2022-10-02 | End: 2022-10-07

## 2022-10-02 RX ADMIN — Medication 2 MILLIGRAM(S): at 21:20

## 2022-10-02 RX ADMIN — Medication 2 MILLIGRAM(S): at 19:10

## 2022-10-02 RX ADMIN — Medication 1 MILLIGRAM(S): at 21:22

## 2022-10-02 RX ADMIN — Medication 1 MILLIGRAM(S): at 12:30

## 2022-10-02 RX ADMIN — LAMOTRIGINE 300 MILLIGRAM(S): 25 TABLET, ORALLY DISINTEGRATING ORAL at 09:05

## 2022-10-02 RX ADMIN — HALOPERIDOL DECANOATE 5 MILLIGRAM(S): 100 INJECTION INTRAMUSCULAR at 22:25

## 2022-10-02 RX ADMIN — Medication 500 MILLIGRAM(S): at 21:24

## 2022-10-02 RX ADMIN — Medication 50 MILLIGRAM(S): at 12:30

## 2022-10-02 RX ADMIN — Medication 50 MILLIGRAM(S): at 22:25

## 2022-10-02 RX ADMIN — QUETIAPINE FUMARATE 400 MILLIGRAM(S): 200 TABLET, FILM COATED ORAL at 21:20

## 2022-10-02 RX ADMIN — LAMOTRIGINE 150 MILLIGRAM(S): 25 TABLET, ORALLY DISINTEGRATING ORAL at 21:24

## 2022-10-02 NOTE — BH SOCIAL WORK INITIAL PSYCHOSOCIAL EVALUATION - NSBHBARRIERS_PSY_ALL_CORE
As per chart; Pt did not take his meds on Thursday evening (9/29), not always compliant with meds/Non-compliant with treatment

## 2022-10-02 NOTE — BH INPATIENT PSYCHIATRY PROGRESS NOTE - NSBHASSESSSUMMFT_PSY_ALL_CORE
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 Pt bib wife to ED on recommendation from his psychiatrist as pt with insomnia, labile mood and affect, irritable agitation which  started over weekend  as the pt with recent change inedicationstaper of seroquefrom 800 mg to 300 mg Recently startedwellbutrin 9/25amictal 300 mg AMlamictal 150 mg PMsychiatrist - Dr. 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 Pt bib wife to ED on recommendation from his psychiatrist as pt with insomnia, labile mood and affect, irritable agitation which  started over weekend  as the pt with recent change in medications taper of seroquel from 800 mg to 300 mg Recently started Wellbutrin 9/25l Lamictal 300 mg AM lamictal 150 mg Psychiatrist - Dr. Shepard  Therapist - sees weekly for DBT Pt was cc of "my head hurts ", pt appeared preoccupied and responding to internal stimuli

## 2022-10-02 NOTE — BH SOCIAL WORK INITIAL PSYCHOSOCIAL EVALUATION - OTHER PAST PSYCHIATRIC HISTORY (INCLUDE DETAILS REGARDING ONSET, COURSE OF ILLNESS, INPATIENT/OUTPATIENT TREATMENT)
PPHX of Bipolar I with psychotic features, hx of multiple SA in setting of psychosis, hx of inpatient admissions, remote hx of substance use

## 2022-10-02 NOTE — H&P ADULT - HISTORY OF PRESENT ILLNESS
45 yo M PMH significant for IBS, Bipolar disorder, Suicidal attempt in setting of psychosis, remote Hx of substance use admitted to 5[facility] for Mx of Disorganized behavior and concern for rony.  Pt is internally preoccupied not speaking much. Nursing assistant 1: 1 at bedside, Pt was banging on the wall and restless for few  minutes and then he became quiet and didn't talk but just stare at the wall and surrounding. As per ED note, yesterday Pt was banging his head on radiator in room on 5N and got laceration on head, s/p laceration repair  in the ED. As per psych note, pt was doing a lot of things at once and talking fast prior ot arrival o the hospital. Pt is poor  historian 2/2 to psych issues. Most of the information obtained by reviewing charts. Medicine consult is called for medical MX. Pt was seen and examined in presence of 1: 1 at bedside.     PMH: As above  PSH: Pt is poor historian can't obtain info. from pt.   Social Hx: Pt is poor historian can't obtain info. from pt.   Family Hx: Pt is poor historian can't obtain info. from pt.

## 2022-10-02 NOTE — BH SOCIAL WORK INITIAL PSYCHOSOCIAL EVALUATION - NSPROGENSOURCEINFO_PSY_ALL_CORE
Assessment deferred; Pt information obtained from Pt chart. Appears internally preoccupied. Unable to participate in interview.  Pt remains impulsive with socially intrusive behavior,  Pt attempting to touch this writer on chest./Other(s)

## 2022-10-02 NOTE — H&P ADULT - NSHPPHYSICALEXAM_GEN_ALL_CORE
ICU Vital Signs Last 24 Hrs  T(C): 36.4 (02 Oct 2022 08:05), Max: 36.8 (02 Oct 2022 01:00)  T(F): 97.5 (02 Oct 2022 08:05), Max: 98.3 (02 Oct 2022 01:00)  HR: 89 (02 Oct 2022 05:00) (89 - 97)  BP: 106/66 (02 Oct 2022 05:00) (106/66 - 111/65)  RR: 18 (02 Oct 2022 08:05) (17 - 18)  SpO2: 100% (02 Oct 2022 08:05) (96% - 100%)    O2 Parameters below as of 02 Oct 2022 08:05  Patient On (Oxygen Delivery Method): room air

## 2022-10-02 NOTE — BH INPATIENT PSYCHIATRY PROGRESS NOTE - CURRENT MEDICATION
MEDICATIONS  (STANDING):  chlorproMAZINE    Injectable 50 milliGRAM(s) IntraMuscular once  clonazePAM  Tablet 2 milliGRAM(s) Oral at bedtime  lamoTRIgine 150 milliGRAM(s) Oral at bedtime  lamoTRIgine 300 milliGRAM(s) Oral daily  LORazepam   Injectable 1 milliGRAM(s) IntraMuscular once  QUEtiapine 300 milliGRAM(s) Oral at bedtime  QUEtiapine 200 milliGRAM(s) Oral once  valproic acid 500 milliGRAM(s) Oral at bedtime    MEDICATIONS  (PRN):  diphenhydrAMINE Injectable 50 milliGRAM(s) IntraMuscular every 6 hours PRN Agitation  haloperidol    Injectable 5 milliGRAM(s) IntraMuscular every 6 hours PRN Agitation  LORazepam   Injectable 2 milliGRAM(s) IntraMuscular every 4 hours PRN Agitation   MEDICATIONS  (STANDING):  clonazePAM  Tablet 2 milliGRAM(s) Oral at bedtime  lamoTRIgine 150 milliGRAM(s) Oral at bedtime  lamoTRIgine 300 milliGRAM(s) Oral daily  QUEtiapine 300 milliGRAM(s) Oral at bedtime  QUEtiapine 200 milliGRAM(s) Oral once  valproic acid 500 milliGRAM(s) Oral at bedtime    MEDICATIONS  (PRN):  diphenhydrAMINE Injectable 50 milliGRAM(s) IntraMuscular every 6 hours PRN Agitation  haloperidol    Injectable 5 milliGRAM(s) IntraMuscular every 6 hours PRN Agitation  LORazepam   Injectable 2 milliGRAM(s) IntraMuscular every 4 hours PRN Agitation

## 2022-10-02 NOTE — H&P ADULT - NS ATTEND AMEND GEN_ALL_CORE FT
hx, exam, data and A/P reviewed w ANNIKA Bran    Pt is a poor historian who cannot contribute to hx  AT times agitated and at other times quiet    VSS  exam as edited and outlined above      #Pychosis/bipolar management as per psych  UDS + benzo but pt on clonazepam      #Dehydration  +ketones in urine  ---- encourage po fluids        #Head lac  CT head no acute changes  1. local care  2. removal as per ED      #VTE  document ambulation  low risk      Will sign off  Please reconsult as needed

## 2022-10-02 NOTE — H&P ADULT - ADDITIONAL PE
+ Laceration repair on head, no active bleeding or discharge noted + Laceration repair, stapled placed on head, no active bleeding or discharge noted

## 2022-10-02 NOTE — BH INPATIENT PSYCHIATRY PROGRESS NOTE - NSBHFUPINTERVALHXFT_PSY_A_CORE
Pt out of his room and is with improved eye contact Alert Pt with linear speech . He denies any auditory or visual hallucinations , denies any suicidal ideation ot any homicidal ideation . Pt remains with socially intrusive behavior,  pt attempting to touch peers and staff . Pt remains impulsive . Still with tangential speech , KAREN, is illogical, poor attention .  Pt needed to get thorazine 50mg and ativan 1mg as he continued to follow others and attempting to touch them, not able to be redirected. Strted depakote 500mg  and increased his seroquel to total of 500mg  Pt out of his room and is with improved eye contact Alert Pt with linear speech . He denies any auditory or visual hallucinations , denies any suicidal ideation ot any homicidal ideation . Pt remains with socially intrusive behavior,  pt attempting to touch peers and staff . Pt remains impulsive . Still with tangential speech , KAREN, is illogical, poor attention .  Pt needed to get thorazine 50mg and ativan 1mg as he continued to follow others and attempting to touch them, not able to be redirected. Started depakote 500mg  and increased his seroquel to total of 500mg

## 2022-10-02 NOTE — H&P ADULT - ASSESSMENT
45 yo M PMH significant for IBS, Bipolar disorder, Suicidal attempt in setting of psychosis, remote Hx of substance use admitted to [facility] for Mx of Disorganized behavior and concern for rony.      # Bipolar disorder/ Psychosis/ Psych problems  - Manage as per primary psych team     # UA: moderate ketones, sp. gravity: 1.020, small bilirubin and + protein   - Encourage oral hydration    # S/p Laceration of head repair  - CT head: no evidence of acute intracranial injury  - no active bleeding or discharge noted     # EKG: NSR, Incomplete RBBB, HR: 94  - reviewed by cardio  - Pt appears comfortable, VSS     # DVT Ppx  -    -  47 yo M PMH significant for IBS, Bipolar disorder, Suicidal attempt in setting of psychosis, remote Hx of substance use admitted to [facility] for Mx of Disorganized behavior and concern for rony.      # Bipolar disorder/ Psychosis/ Psych problems  - Manage as per primary psych team     # UA: moderate ketones, sp. gravity: 1.020, small bilirubin and + protein   - Encourage oral hydration    # S/p Laceration of head repair  - CT head: no evidence of acute intracranial injury  - Laceration repaired , Staples placed by ED MD. no active bleeding or discharge noted     # EKG: NSR, Incomplete RBBB, HR: 94  - reviewed by cardio  - Pt appears comfortable, VSS     # DVT Ppx  -  Encourage Ambulation     IMPROVE VTE Individual Risk Assessment    RISK                                                                Points    [  ] Previous VTE                                                  3    [  ] Thrombophilia                                               2    [  ] Lower limb paralysis                                      2        (unable to hold up >15 seconds)      [  ] Current Cancer                                              2         (within 6 months)    [  ] Immobilization > 24 hrs                                1    [  ] ICU/CCU stay > 24 hours                              1    [  ] Age > 60                                                      1    IMPROVE VTE Score ____0 _____    IMPROVE Score 0-1: Low Risk, No VTE prophylaxis required for most patients, encourage ambulation.   IMPROVE Score 2-3: At risk, pharmacologic VTE prophylaxis is indicated for most patients (in the absence of a contraindication)  IMPROVE Score > or = 4: High Risk, pharmacologic VTE prophylaxis is indicated for most patients (in the absence of a contraindication)    Case d/w Dr. Browne

## 2022-10-02 NOTE — BH SOCIAL WORK INITIAL PSYCHOSOCIAL EVALUATION - NSBHDISABILITY_PSY_ALL_CORE
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 Unknown of financial income if Pt receives SSI/D/Unable to answer (specify)

## 2022-10-02 NOTE — BH INPATIENT PSYCHIATRY PROGRESS NOTE - NSBHCHARTREVIEWVS_PSY_A_CORE FT
Vital Signs Last 24 Hrs  T(C): 36.4 (10-02-22 @ 08:05), Max: 37.2 (10-01-22 @ 18:09)  T(F): 97.5 (10-02-22 @ 08:05), Max: 98.9 (10-01-22 @ 18:09)  HR: 89 (10-02-22 @ 05:00) (89 - 144)  BP: 106/66 (10-02-22 @ 05:00) (106/66 - 134/84)  BP(mean): --  RR: 18 (10-02-22 @ 08:05) (14 - 19)  SpO2: 100% (10-02-22 @ 08:05) (96% - 100%)    Orthostatic VS  10-02-22 @ 08:05  Lying BP: --/-- HR: --  Sitting BP: 106/71 HR: 110  Standing BP: 91/77 HR: 104  Site: --  Mode: electronic  Orthostatic VS  10-01-22 @ 17:40  Lying BP: 130/84 HR: 100  Sitting BP: --/-- HR: --  Standing BP: --/-- HR: --  Site: --  Mode: --   Vital Signs Last 24 Hrs  T(C): 36.4 (10-02-22 @ 08:05), Max: 36.8 (10-02-22 @ 01:00)  T(F): 97.5 (10-02-22 @ 08:05), Max: 98.3 (10-02-22 @ 01:00)  HR: 89 (10-02-22 @ 05:00) (89 - 97)  BP: 106/66 (10-02-22 @ 05:00) (106/66 - 111/65)  BP(mean): --  RR: 18 (10-02-22 @ 08:05) (17 - 18)  SpO2: 100% (10-02-22 @ 08:05) (96% - 100%)    Orthostatic VS  10-02-22 @ 08:05  Lying BP: --/-- HR: --  Sitting BP: 106/71 HR: 110  Standing BP: 91/77 HR: 104  Site: --  Mode: electronic  Orthostatic VS  10-01-22 @ 17:40  Lying BP: 130/84 HR: 100  Sitting BP: --/-- HR: --  Standing BP: --/-- HR: --  Site: --  Mode: --

## 2022-10-03 PROCEDURE — 99232 SBSQ HOSP IP/OBS MODERATE 35: CPT

## 2022-10-03 RX ORDER — CHLORPROMAZINE HCL 10 MG
50 TABLET ORAL ONCE
Refills: 0 | Status: DISCONTINUED | OUTPATIENT
Start: 2022-10-03 | End: 2022-10-03

## 2022-10-03 RX ORDER — HALOPERIDOL DECANOATE 100 MG/ML
5 INJECTION INTRAMUSCULAR ONCE
Refills: 0 | Status: DISCONTINUED | OUTPATIENT
Start: 2022-10-03 | End: 2022-10-03

## 2022-10-03 RX ORDER — CHLORPROMAZINE HCL 10 MG
50 TABLET ORAL ONCE
Refills: 0 | Status: DISCONTINUED | OUTPATIENT
Start: 2022-10-03 | End: 2022-10-07

## 2022-10-03 RX ADMIN — Medication 500 MILLIGRAM(S): at 20:58

## 2022-10-03 RX ADMIN — Medication 1 MILLIGRAM(S): at 20:57

## 2022-10-03 RX ADMIN — QUETIAPINE FUMARATE 400 MILLIGRAM(S): 200 TABLET, FILM COATED ORAL at 20:58

## 2022-10-03 RX ADMIN — HALOPERIDOL DECANOATE 5 MILLIGRAM(S): 100 INJECTION INTRAMUSCULAR at 12:35

## 2022-10-03 RX ADMIN — Medication 2 MILLIGRAM(S): at 20:58

## 2022-10-03 RX ADMIN — Medication 2 MILLIGRAM(S): at 12:35

## 2022-10-03 RX ADMIN — QUETIAPINE FUMARATE 200 MILLIGRAM(S): 200 TABLET, FILM COATED ORAL at 02:31

## 2022-10-03 NOTE — BH SAFETY PLAN - INTERNAL COPING STRATEGY 6
Sit outside in the [employer] chair and touch my feet to the ground.  Sit outside in the [place] chair and touch my feet to the ground.

## 2022-10-03 NOTE — BH INPATIENT PSYCHIATRY PROGRESS NOTE - NSBHCHARTREVIEWVS_PSY_A_CORE FT
Vital Signs Last 24 Hrs  T(C): 36.5 (10-03-22 @ 07:26), Max: 36.5 (10-03-22 @ 07:26)  T(F): 97.7 (10-03-22 @ 07:26), Max: 97.7 (10-03-22 @ 07:26)  HR: --  BP: --  BP(mean): --  RR: 16 (10-03-22 @ 07:26) (16 - 16)  SpO2: 98% (10-03-22 @ 07:26) (98% - 98%)    Orthostatic VS  10-03-22 @ 07:26  Lying BP: --/-- HR: --  Sitting BP: 116/68 HR: 110  Standing BP: 104/68 HR: 112  Site: upper right arm  Mode: electronic  Orthostatic VS  10-02-22 @ 08:05  Lying BP: --/-- HR: --  Sitting BP: 106/71 HR: 110  Standing BP: 91/77 HR: 104  Site: --  Mode: electronic

## 2022-10-03 NOTE — BH INPATIENT PSYCHIATRY PROGRESS NOTE - NSBHFUPINTERVALHXFT_PSY_A_CORE
pt refusing to take his oral medications  . Pt still with periods of attempting to  hit his head , cc of too many thoughts.  Pt remaining impulsive , illogical .  Pt with better decrease of aggressive behavior with the use of Thorazine but still needing the ativan for the agitated catatonic episodes

## 2022-10-03 NOTE — BH INPATIENT PSYCHIATRY PROGRESS NOTE - CURRENT MEDICATION
MEDICATIONS  (STANDING):  chlorproMAZINE    Injectable 50 milliGRAM(s) IntraMuscular once  clonazePAM  Tablet 2 milliGRAM(s) Oral at bedtime  LORazepam     Tablet 1 milliGRAM(s) Oral three times a day  LORazepam   Injectable 2 milliGRAM(s) IntraMuscular once  QUEtiapine 400 milliGRAM(s) Oral at bedtime  QUEtiapine 200 milliGRAM(s) Oral daily  valproic acid 500 milliGRAM(s) Oral at bedtime    MEDICATIONS  (PRN):  diphenhydrAMINE Injectable 50 milliGRAM(s) IntraMuscular every 6 hours PRN Agitation  haloperidol    Injectable 5 milliGRAM(s) IntraMuscular every 6 hours PRN Agitation  LORazepam   Injectable 2 milliGRAM(s) IntraMuscular every 4 hours PRN Agitation

## 2022-10-03 NOTE — BH INPATIENT PSYCHIATRY PROGRESS NOTE - NSBHASSESSSUMMFT_PSY_ALL_CORE
46MHX of Bipolar I with psychotic features, hx of SAs in setting of psychosis disorganized behavior, impulsive and self damaging.   Pt with high risk of self directed aggressive behavior. Pt now refusing to take any of his oral medications

## 2022-10-03 NOTE — BH SAFETY PLAN - INTERNAL COPING STRATEGY 1
"Irrational Behavior" versus "Depressive Episode" signs to communicate with my wife.  "Irrational Behavior" versus "Depressive Episode" signs to communicate with my wife. Visualization.

## 2022-10-03 NOTE — BH SAFETY PLAN - WARNING SIGN 1
Trigger: Medication adjustment and inconsistent adherence.  No SI since 2018. Trigger to rony "panic episodes": Stopped taking medication. School bus, children playing, certain dates. (think about my past job.)

## 2022-10-04 LAB
COVID-19 SPIKE DOMAIN AB INTERP: POSITIVE
COVID-19 SPIKE DOMAIN ANTIBODY RESULT: >250 U/ML — HIGH
SARS-COV-2 IGG+IGM SERPL QL IA: >250 U/ML — HIGH
SARS-COV-2 IGG+IGM SERPL QL IA: POSITIVE
SARS-COV-2 RNA SPEC QL NAA+PROBE: SIGNIFICANT CHANGE UP

## 2022-10-04 PROCEDURE — 99232 SBSQ HOSP IP/OBS MODERATE 35: CPT

## 2022-10-04 RX ADMIN — Medication 500 MILLIGRAM(S): at 22:25

## 2022-10-04 RX ADMIN — Medication 2 MILLIGRAM(S): at 22:23

## 2022-10-04 RX ADMIN — Medication 1 MILLIGRAM(S): at 12:40

## 2022-10-04 RX ADMIN — Medication 1 MILLIGRAM(S): at 09:18

## 2022-10-04 RX ADMIN — Medication 1 MILLIGRAM(S): at 22:23

## 2022-10-04 RX ADMIN — QUETIAPINE FUMARATE 200 MILLIGRAM(S): 200 TABLET, FILM COATED ORAL at 09:18

## 2022-10-04 RX ADMIN — QUETIAPINE FUMARATE 400 MILLIGRAM(S): 200 TABLET, FILM COATED ORAL at 22:23

## 2022-10-04 NOTE — BH TREATMENT PLAN - NSTXDCOTHRGOAL_PSY_ALL_CORE
Patient will be referred to the appropriate level of outpatient treatment and have appointments within 3-5 days of discharge.  Patient will be discharged to safe housing either back home or DSS emergency housing.  Benefits in place   will explore need for duel diagnosis tx with appointments if needed.

## 2022-10-04 NOTE — BH INPATIENT PSYCHIATRY PROGRESS NOTE - NSBHASSESSSUMMFT_PSY_ALL_CORE
Pt out of his room and is with improved eye contact Alert Pt with linear speech . He denies any auditory or visual hallucinations , denies any suicidal ideation ot any homicidal ideation.  Pt with behavioral control . Denies any side effect s from medication

## 2022-10-04 NOTE — BH TREATMENT PLAN - NSTXPLANTHERAPYSESSIONSFT_PSY_ALL_CORE
10-02-22  Type of therapy: N/A  Type of session: N/A  Level of patient participation: Excused  Duration of participation: 0 minutes  Therapy conducted by: Psych rehab  Therapy Summary: Patient excused from group today due to intrusive and impulsive symptoms. Education and support ongoing.    10-03-22  Type of therapy: N/A  Type of session: N/A  Level of patient participation: Excused  Duration of participation: 0 minutes  Therapy conducted by: Psych rehab  Therapy Summary: Patient unable to meaningfully engage in group programming at this time due to severity of symptoms.

## 2022-10-04 NOTE — BH INPATIENT PSYCHIATRY PROGRESS NOTE - NSBHCHARTREVIEWVS_PSY_A_CORE FT
Vital Signs Last 24 Hrs  T(C): 37.3 (10-04-22 @ 07:47), Max: 37.3 (10-04-22 @ 07:47)  T(F): 99.2 (10-04-22 @ 07:47), Max: 99.2 (10-04-22 @ 07:47)  HR: --  BP: --  BP(mean): --  RR: 18 (10-04-22 @ 07:47) (18 - 18)  SpO2: 100% (10-04-22 @ 07:47) (100% - 100%)    Orthostatic VS  10-04-22 @ 07:47  Lying BP: --/-- HR: --  Sitting BP: 120/85 HR: 100  Standing BP: 112/78 HR: 104  Site: upper right arm  Mode: electronic  Orthostatic VS  10-03-22 @ 07:26  Lying BP: --/-- HR: --  Sitting BP: 116/68 HR: 110  Standing BP: 104/68 HR: 112  Site: upper right arm  Mode: electronic

## 2022-10-04 NOTE — BH INPATIENT PSYCHIATRY PROGRESS NOTE - NSBHFUPINTERVALHXFT_PSY_A_CORE
Pt attempting to be more compliant with medication and is less isolative today and not attempting to self injure. Pt is able to be redirected  and able to be less socially intrusive and preoccupied. Pt on seroquel 600mg, depakote 500mg , ativan 1mg po tid , klonopin 2mg po hs  Spoke with the pt who is more goal directed today , improved eye contact.

## 2022-10-04 NOTE — BH TREATMENT PLAN - NSTXCOPEINTERPR_PSY_ALL_CORE
Patient will be encouraged to attend 1-2 psych rehab groups a day to work on coping skill development.

## 2022-10-05 LAB
A1C WITH ESTIMATED AVERAGE GLUCOSE RESULT: 4.8 % — SIGNIFICANT CHANGE UP (ref 4–5.6)
CHOLEST SERPL-MCNC: 167 MG/DL — SIGNIFICANT CHANGE UP
ESTIMATED AVERAGE GLUCOSE: 91 MG/DL — SIGNIFICANT CHANGE UP (ref 68–114)
HDLC SERPL-MCNC: 44 MG/DL — SIGNIFICANT CHANGE UP
LIPID PNL WITH DIRECT LDL SERPL: 103 MG/DL — HIGH
NON HDL CHOLESTEROL: 123 MG/DL — SIGNIFICANT CHANGE UP
TRIGL SERPL-MCNC: 99 MG/DL — SIGNIFICANT CHANGE UP

## 2022-10-05 PROCEDURE — 99232 SBSQ HOSP IP/OBS MODERATE 35: CPT

## 2022-10-05 RX ORDER — SENNA PLUS 8.6 MG/1
2 TABLET ORAL AT BEDTIME
Refills: 0 | Status: DISCONTINUED | OUTPATIENT
Start: 2022-10-05 | End: 2022-10-07

## 2022-10-05 RX ORDER — IBUPROFEN 200 MG
400 TABLET ORAL EVERY 6 HOURS
Refills: 0 | Status: DISCONTINUED | OUTPATIENT
Start: 2022-10-05 | End: 2022-10-07

## 2022-10-05 RX ADMIN — Medication 400 MILLIGRAM(S): at 13:51

## 2022-10-05 RX ADMIN — Medication 2 MILLIGRAM(S): at 20:25

## 2022-10-05 RX ADMIN — Medication 1 MILLIGRAM(S): at 09:34

## 2022-10-05 RX ADMIN — Medication 500 MILLIGRAM(S): at 20:26

## 2022-10-05 RX ADMIN — QUETIAPINE FUMARATE 400 MILLIGRAM(S): 200 TABLET, FILM COATED ORAL at 20:25

## 2022-10-05 RX ADMIN — Medication 1 MILLIGRAM(S): at 20:25

## 2022-10-05 RX ADMIN — Medication 1 MILLIGRAM(S): at 12:45

## 2022-10-05 RX ADMIN — QUETIAPINE FUMARATE 200 MILLIGRAM(S): 200 TABLET, FILM COATED ORAL at 09:34

## 2022-10-05 NOTE — BH INPATIENT PSYCHIATRY PROGRESS NOTE - NSBHCHARTREVIEWVS_PSY_A_CORE FT
Vital Signs Last 24 Hrs  T(C): 36.9 (10-05-22 @ 07:26), Max: 36.9 (10-05-22 @ 07:26)  T(F): 98.4 (10-05-22 @ 07:26), Max: 98.4 (10-05-22 @ 07:26)  HR: --  BP: --  BP(mean): --  RR: 16 (10-05-22 @ 07:26) (16 - 16)  SpO2: 97% (10-05-22 @ 07:26) (97% - 97%)    Orthostatic VS  10-05-22 @ 07:26  Lying BP: --/-- HR: --  Sitting BP: 113/72 HR: 95  Standing BP: 105/69 HR: 100  Site: upper right arm  Mode: electronic  Orthostatic VS  10-04-22 @ 07:47  Lying BP: --/-- HR: --  Sitting BP: 120/85 HR: 100  Standing BP: 112/78 HR: 104  Site: upper right arm  Mode: electronic

## 2022-10-05 NOTE — BH INPATIENT PSYCHIATRY PROGRESS NOTE - CURRENT MEDICATION
MEDICATIONS  (STANDING):  chlorproMAZINE    Injectable 50 milliGRAM(s) IntraMuscular once  clonazePAM  Tablet 2 milliGRAM(s) Oral at bedtime  LORazepam     Tablet 1 milliGRAM(s) Oral three times a day  LORazepam   Injectable 2 milliGRAM(s) IntraMuscular once  QUEtiapine 400 milliGRAM(s) Oral at bedtime  QUEtiapine 200 milliGRAM(s) Oral daily  valproic acid 500 milliGRAM(s) Oral at bedtime    MEDICATIONS  (PRN):  diphenhydrAMINE Injectable 50 milliGRAM(s) IntraMuscular every 6 hours PRN Agitation  haloperidol    Injectable 5 milliGRAM(s) IntraMuscular every 6 hours PRN Agitation  ibuprofen  Tablet. 400 milliGRAM(s) Oral every 6 hours PRN Moderate Pain (4 - 6)  LORazepam   Injectable 2 milliGRAM(s) IntraMuscular every 4 hours PRN Agitation  senna 2 Tablet(s) Oral at bedtime PRN constipation

## 2022-10-05 NOTE — BH INPATIENT PSYCHIATRY DISCHARGE NOTE - NSDCMRMEDTOKEN_GEN_ALL_CORE_FT
clonazePAM 2 mg oral tablet: 1 tab(s) orally once a day (at bedtime) MDD:2mg  lamoTRIgine 150 mg oral tablet: 1 tab(s) orally once a day (at bedtime)  lamoTRIgine 300 mg oral tablet, extended release: 1 tab(s) orally once a day  QUEtiapine 300 mg oral tablet: 1 tab(s) orally once a day (at bedtime)  Remeron 15 mg oral tablet: 1 tab(s) orally once a day (at bedtime)  Sonata 10 mg oral capsule: 1 cap(s) orally once a day (at bedtime), As Needed  Wellbutrin: 150 milligram(s) orally once a day   clonazePAM 2 mg oral tablet: 1 tab(s) orally once a day (at bedtime) MDD:2mg  LORazepam 1 mg oral tablet: 1 tab(s) orally 3 times a day MDD:3mg  QUEtiapine 200 mg oral tablet: 1 tab(s) orally once a day  QUEtiapine 400 mg oral tablet: 1 tab(s) orally once a day (at bedtime)  valproic acid 250 mg oral capsule: 2 cap(s) orally once a day (at bedtime)

## 2022-10-05 NOTE — BH INPATIENT PSYCHIATRY DISCHARGE NOTE - NSDCCPCAREPLAN_GEN_ALL_CORE_FT
PRINCIPAL DISCHARGE DIAGNOSIS  Diagnosis: Bipolar affective  Assessment and Plan of Treatment:

## 2022-10-05 NOTE — BH INPATIENT PSYCHIATRY PROGRESS NOTE - NSBHFUPINTERVALHXFT_PSY_A_CORE
Pt with improving goal directed speech.  Pt with increased eye contact, able to make needs known. Pt with no side effects from medication . Valproic acid level due thursday. Taken off of 1:1 observation    Spoke with and met with the wife who agreed that the pt "seems much better " and is hoping that he can return home this week." Pt denies any suicidal ideation intent or plan

## 2022-10-05 NOTE — BH INPATIENT PSYCHIATRY DISCHARGE NOTE - NSBHMETABOLIC_PSY_ALL_CORE_FT
BMI: BMI (kg/m2): 23.1 (10-04-22 @ 10:23)  HbA1c: A1C with Estimated Average Glucose Result: 4.8 % (10-05-22 @ 08:54)    Glucose:   BP: --  Lipid Panel: Date/Time: 10-05-22 @ 08:54  Cholesterol, Serum: 167  Direct LDL: --  HDL Cholesterol, Serum: 44  Total Cholesterol/HDL Ration Measurement: --  Triglycerides, Serum: 99

## 2022-10-05 NOTE — BH INPATIENT PSYCHIATRY DISCHARGE NOTE - DISCHARGING ATTENDING PHYSICIAN:
Dr Ryan [Medication and Allergies Reconciled] : medication and allergies reconciled [High Risk Medications Reviewed and Reconciled (Beers Criteria)] : high risk medications reviewed and reconciled [Reviewed patient reported medication adherence from Comprehensive Assessment] : Reviewed patient reported medication adherence from comprehensive assessment [Adherent to medications] : Patient is adherent to medications as prescribed

## 2022-10-05 NOTE — BH INPATIENT PSYCHIATRY DISCHARGE NOTE - REASON FOR ADMISSION
`46M, domiciled with wifePPHX of Bipolar I with psychotic features, hx of SAs in setting of psychosis, hx of inpatient admissions, talking fast, doing a lot of things at once.Recent attempt by oupatient decreaseing the seroquel and then added wellbutrin and the pt became increasingly with increased restless, hallucination ,increased impulsivity with increased attempts to self injure in vain attempt to decrease the thought flooding.  . Throughout the week, patient's behavior wavered between being calm, fearful and irritable `46M, domiciled with wife PPHX of Bipolar I with psychotic features, hx of SAs in setting of psychosis, hx of inpatient admissions, talking fast, doing a lot of things at once. Recent attempt by outpatient decreasing the Seroquel and then added Wellbutrin and the pt became increasingly with increased restless, hallucination ,increased impulsivity with increased attempts to self injure in vain attempt to decrease the thought flooding.  . Throughout the week, patient's behavior wavered between being calm, fearful and irritable

## 2022-10-06 PROCEDURE — 99232 SBSQ HOSP IP/OBS MODERATE 35: CPT

## 2022-10-06 RX ADMIN — QUETIAPINE FUMARATE 400 MILLIGRAM(S): 200 TABLET, FILM COATED ORAL at 20:23

## 2022-10-06 RX ADMIN — Medication 2 MILLIGRAM(S): at 20:23

## 2022-10-06 RX ADMIN — QUETIAPINE FUMARATE 200 MILLIGRAM(S): 200 TABLET, FILM COATED ORAL at 09:29

## 2022-10-06 RX ADMIN — Medication 1 MILLIGRAM(S): at 09:29

## 2022-10-06 RX ADMIN — Medication 1 MILLIGRAM(S): at 20:23

## 2022-10-06 RX ADMIN — Medication 500 MILLIGRAM(S): at 20:23

## 2022-10-06 NOTE — BH INPATIENT PSYCHIATRY PROGRESS NOTE - NSBHFUPINTERVALHXFT_PSY_A_CORE
Pt with improved goal directed speech.  He is denying any depression anxiety. Pt with denyial of suicidal or homicidal ideation intent or plan.   Pt denies any side effects from medication .  Pt with behavioral control and no attempt to self injure. Valproic acid level due in the am of 10/7

## 2022-10-06 NOTE — BH DISCHARGE NOTE NURSING/SOCIAL WORK/PSYCH REHAB - NSCDUDCCRISIS_PSY_A_CORE
[facility]  1 [phone] [facility]-WELL ([phone])  Text "WELL" to [phone]  Website: www.[employer]/.[facility] 1 [phone] 621-HOPE (7487) Website: www.safehorizon.org/.  [facility] – Crisis Care for Children, Adults and Families  [address] [place] [address]  Mobile Crisis Hotline – (724) 982-5886/.National Suicide Prevention Lifeline 6 (595) 103-6429/.  [facility]  1 (629) LIFEN[facility] (065-0659)/.  [facility]  (443) 466-7085/.  [facility] Behavioral Health Helpline / [facility] Mobile Crisis  (220) 761-JQJL (8658)/.  [facility] Response Crisis Hotline  (978) 222-7637  24 hour telephone crisis intervention and suicide prevention hotline concerned with all mental health issues/.  [facility]’s [facility]al [facility] Crisis [facility]  [phone] [place] [phone]   Hours:  Monday through Friday from 9 AM to 3 PM/.  [employer] [facility] Crisis Line  6 [phone]) 337-9025, Option 1/Other.../.  [facility]  [phone] [place] [address] [phone]   Hours: Monday through Friday from 10 AM to 4 PM/988 Suicide and Crisis Lifeline

## 2022-10-06 NOTE — BH INPATIENT PSYCHIATRY PROGRESS NOTE - NSBHASSESSSUMMFT_PSY_ALL_CORE
46MHX of Bipolar I with psychotic feature , currently in behavioral control and is not self injuring himself at this time. Pt with denial of any hallucination . no delusions elicited.  Pt is with improved goal directed speech .

## 2022-10-06 NOTE — BH DISCHARGE NOTE NURSING/SOCIAL WORK/PSYCH REHAB - NSDCADDINFO1FT_PSY_ALL_CORE
You have a telehealth appt with Dr. Shepard for medication management on 10/8 @ 9am. Please call # above if you need to make any changes.

## 2022-10-06 NOTE — BH DISCHARGE NOTE NURSING/SOCIAL WORK/PSYCH REHAB - NSDCPEEDUCATIONOTHER_PSY_ALL_CORE_FT
Pt. and family were educated on the importance of pt. taking medications as prescribed and to not stop or miss any doses unless directed by prescribing provider. Pt. and family were counseled on the importance of pt. attending outpatient appointments for ongoing development of coping skills in particular. Pt. and family were made aware of crisis resources to use after hours as needed including returning to the hospital.

## 2022-10-06 NOTE — BH INPATIENT PSYCHIATRY PROGRESS NOTE - NSBHCHARTREVIEWVS_PSY_A_CORE FT
Vital Signs Last 24 Hrs  T(C): 36.8 (10-06-22 @ 07:18), Max: 36.8 (10-06-22 @ 07:18)  T(F): 98.3 (10-06-22 @ 07:18), Max: 98.3 (10-06-22 @ 07:18)  HR: --  BP: --  BP(mean): --  RR: 18 (10-06-22 @ 07:18) (18 - 18)  SpO2: 99% (10-06-22 @ 07:18) (99% - 99%)    Orthostatic VS  10-06-22 @ 07:18  Lying BP: --/-- HR: --  Sitting BP: 117/76 HR: 82  Standing BP: 110/74 HR: 86  Site: upper right arm  Mode: electronic  Orthostatic VS  10-05-22 @ 07:26  Lying BP: --/-- HR: --  Sitting BP: 113/72 HR: 95  Standing BP: 105/69 HR: 100  Site: upper right arm  Mode: electronic

## 2022-10-06 NOTE — BH DISCHARGE NOTE NURSING/SOCIAL WORK/PSYCH REHAB - PATIENT PORTAL LINK FT
You can access the FollowMyHealth Patient Portal offered by [facility] by registering at the following website: [email or web address]. By joining [employer]’s FollowMyHealth portal, you will also be able to view your health information using other applications (apps) compatible with our system.

## 2022-10-06 NOTE — BH DISCHARGE NOTE NURSING/SOCIAL WORK/PSYCH REHAB - NSDCADDINFO2FT_PSY_ALL_CORE
You have a telehealth appt with your therapist Alejandro on 10/12 @ 4:15pm. Please call number above if you need to reschedule.

## 2022-10-07 VITALS — TEMPERATURE: 98 F | OXYGEN SATURATION: 100 % | RESPIRATION RATE: 16 BRPM

## 2022-10-07 LAB — VALPROATE SERPL-MCNC: 52 UG/ML — SIGNIFICANT CHANGE UP (ref 50–100)

## 2022-10-07 PROCEDURE — 99239 HOSP IP/OBS DSCHRG MGMT >30: CPT

## 2022-10-07 RX ORDER — QUETIAPINE FUMARATE 200 MG/1
1 TABLET, FILM COATED ORAL
Qty: 15 | Refills: 1
Start: 2022-10-07 | End: 2022-11-05

## 2022-10-07 RX ORDER — MIRTAZAPINE 45 MG/1
1 TABLET, ORALLY DISINTEGRATING ORAL
Qty: 0 | Refills: 0 | DISCHARGE

## 2022-10-07 RX ORDER — LAMOTRIGINE 25 MG/1
1 TABLET, ORALLY DISINTEGRATING ORAL
Qty: 0 | Refills: 0 | DISCHARGE

## 2022-10-07 RX ORDER — VALPROIC ACID (AS SODIUM SALT) 250 MG/5ML
750 SOLUTION, ORAL ORAL AT BEDTIME
Refills: 0 | Status: DISCONTINUED | OUTPATIENT
Start: 2022-10-07 | End: 2022-10-07

## 2022-10-07 RX ORDER — VALPROIC ACID (AS SODIUM SALT) 250 MG/5ML
3 SOLUTION, ORAL ORAL
Qty: 45 | Refills: 1
Start: 2022-10-07 | End: 2022-11-05

## 2022-10-07 RX ORDER — VALPROIC ACID (AS SODIUM SALT) 250 MG/5ML
500 SOLUTION, ORAL ORAL AT BEDTIME
Refills: 0 | Status: DISCONTINUED | OUTPATIENT
Start: 2022-10-07 | End: 2022-10-07

## 2022-10-07 RX ORDER — CLONAZEPAM 1 MG
1 TABLET ORAL AT BEDTIME
Refills: 0 | Status: DISCONTINUED | OUTPATIENT
Start: 2022-10-07 | End: 2022-10-07

## 2022-10-07 RX ORDER — VALPROIC ACID (AS SODIUM SALT) 250 MG/5ML
2 SOLUTION, ORAL ORAL
Qty: 30 | Refills: 1
Start: 2022-10-07 | End: 2022-11-05

## 2022-10-07 RX ORDER — ZALEPLON 10 MG
1 CAPSULE ORAL
Qty: 0 | Refills: 0 | DISCHARGE

## 2022-10-07 RX ORDER — CLONAZEPAM 1 MG
2 TABLET ORAL AT BEDTIME
Refills: 0 | Status: DISCONTINUED | OUTPATIENT
Start: 2022-10-07 | End: 2022-10-07

## 2022-10-07 RX ORDER — CLONAZEPAM 1 MG
1 TABLET ORAL
Qty: 15 | Refills: 0
Start: 2022-10-07 | End: 2022-10-21

## 2022-10-07 RX ORDER — QUETIAPINE FUMARATE 200 MG/1
1 TABLET, FILM COATED ORAL
Qty: 0 | Refills: 0 | DISCHARGE

## 2022-10-07 RX ORDER — HALOPERIDOL DECANOATE 100 MG/ML
5 INJECTION INTRAMUSCULAR EVERY 6 HOURS
Refills: 0 | Status: DISCONTINUED | OUTPATIENT
Start: 2022-10-07 | End: 2022-10-07

## 2022-10-07 RX ORDER — CLONAZEPAM 1 MG
1 TABLET ORAL
Qty: 30 | Refills: 0
Start: 2022-10-07 | End: 2022-11-05

## 2022-10-07 RX ORDER — BUPROPION HYDROCHLORIDE 150 MG/1
150 TABLET, EXTENDED RELEASE ORAL
Qty: 0 | Refills: 0 | DISCHARGE

## 2022-10-07 RX ADMIN — Medication 1 MILLIGRAM(S): at 09:08

## 2022-10-07 RX ADMIN — QUETIAPINE FUMARATE 200 MILLIGRAM(S): 200 TABLET, FILM COATED ORAL at 09:08

## 2022-10-07 NOTE — BH INPATIENT PSYCHIATRY PROGRESS NOTE - CURRENT MEDICATION
MEDICATIONS  (STANDING):  clonazePAM  Tablet 2 milliGRAM(s) Oral at bedtime  LORazepam     Tablet 1 milliGRAM(s) Oral three times a day  QUEtiapine 400 milliGRAM(s) Oral at bedtime  QUEtiapine 200 milliGRAM(s) Oral daily  valproic acid 500 milliGRAM(s) Oral at bedtime  valproic acid 750 milliGRAM(s) Oral at bedtime    MEDICATIONS  (PRN):

## 2022-10-07 NOTE — BH INPATIENT PSYCHIATRY PROGRESS NOTE - PRN MEDS
MEDICATIONS  (PRN):  diphenhydrAMINE Injectable 50 milliGRAM(s) IntraMuscular every 6 hours PRN Agitation  haloperidol    Injectable 5 milliGRAM(s) IntraMuscular every 6 hours PRN Agitation  ibuprofen  Tablet. 400 milliGRAM(s) Oral every 6 hours PRN Moderate Pain (4 - 6)  LORazepam   Injectable 2 milliGRAM(s) IntraMuscular every 4 hours PRN Agitation  senna 2 Tablet(s) Oral at bedtime PRN constipation  
MEDICATIONS  (PRN):  diphenhydrAMINE Injectable 50 milliGRAM(s) IntraMuscular every 6 hours PRN Agitation  haloperidol    Injectable 5 milliGRAM(s) IntraMuscular every 6 hours PRN Agitation  LORazepam   Injectable 2 milliGRAM(s) IntraMuscular every 4 hours PRN Agitation  
MEDICATIONS  (PRN):  diphenhydrAMINE Injectable 50 milliGRAM(s) IntraMuscular every 6 hours PRN Agitation  haloperidol    Injectable 5 milliGRAM(s) IntraMuscular every 6 hours PRN Agitation  ibuprofen  Tablet. 400 milliGRAM(s) Oral every 6 hours PRN Moderate Pain (4 - 6)  LORazepam   Injectable 2 milliGRAM(s) IntraMuscular every 4 hours PRN Agitation  senna 2 Tablet(s) Oral at bedtime PRN constipation  
MEDICATIONS  (PRN):  diphenhydrAMINE Injectable 50 milliGRAM(s) IntraMuscular every 6 hours PRN Agitation  haloperidol    Injectable 5 milliGRAM(s) IntraMuscular every 6 hours PRN Agitation  LORazepam   Injectable 2 milliGRAM(s) IntraMuscular every 4 hours PRN Agitation  
MEDICATIONS  (PRN):  
MEDICATIONS  (PRN):  diphenhydrAMINE Injectable 50 milliGRAM(s) IntraMuscular every 6 hours PRN Agitation  haloperidol    Injectable 5 milliGRAM(s) IntraMuscular every 6 hours PRN Agitation  LORazepam   Injectable 2 milliGRAM(s) IntraMuscular every 4 hours PRN Agitation

## 2022-10-07 NOTE — BH INPATIENT PSYCHIATRY PROGRESS NOTE - NSBHMETABOLIC_PSY_ALL_CORE_FT
BMI: BMI (kg/m2): 23.1 (10-04-22 @ 10:23)  HbA1c:   Glucose:   BP: 106/66 (10-02-22 @ 05:00) (106/66 - 111/65)  Lipid Panel: 
BMI: BMI (kg/m2): 23.1 (10-04-22 @ 10:23)  HbA1c: A1C with Estimated Average Glucose Result: 4.8 % (10-05-22 @ 08:54)    Glucose:   BP: --  Lipid Panel: Date/Time: 10-05-22 @ 08:54  Cholesterol, Serum: 167  Direct LDL: --  HDL Cholesterol, Serum: 44  Total Cholesterol/HDL Ration Measurement: --  Triglycerides, Serum: 99  
BMI: BMI (kg/m2): 25.1 (10-01-22 @ 18:09)  HbA1c:   Glucose:   BP: 106/66 (10-02-22 @ 05:00) (106/66 - 111/65)  Lipid Panel: 
BMI: BMI (kg/m2): 25.1 (10-01-22 @ 18:09)  HbA1c:   Glucose:   BP: 106/66 (10-02-22 @ 05:00) (106/66 - 111/65)  Lipid Panel:

## 2022-10-07 NOTE — BH INPATIENT PSYCHIATRY PROGRESS NOTE - NSBHADMITCOUNSEL_PSY_A_CORE
Patient admitted for shortness of breath.
instructions for management, treatment and follow up/client/family/caregiver education
risks and benefits of treatment options/client/family/caregiver education

## 2022-10-07 NOTE — BH INPATIENT PSYCHIATRY PROGRESS NOTE - NSBHFUPINTERVALCCFT_PSY_A_CORE
"I'm doing much better"
"I'm feeling better "
Pt still preoccupied with own thoughts. 
"I just want to talk"
"I'm feeling much better"
"I feel much better"

## 2022-10-07 NOTE — BH INPATIENT PSYCHIATRY PROGRESS NOTE - NSTXMANICINTERMD_PSY_ALL_CORE
klonopin . seroquel

## 2022-10-07 NOTE — BH INPATIENT PSYCHIATRY PROGRESS NOTE - NSBHMSETHTCONTENT_PSY_A_CORE
Preoccupations
Delusions/Preoccupations
Delusions/Preoccupations

## 2022-10-07 NOTE — BH INPATIENT PSYCHIATRY PROGRESS NOTE - NSBHMSESPEECH_PSY_A_CORE
Non-verbal: unable to assess speech further
Normal volume, rate, productivity, spontaneity and articulation

## 2022-10-07 NOTE — BH INPATIENT PSYCHIATRY PROGRESS NOTE - NSBHCONSBHPROVCNTCTNOFT_PSY_A_CORE
pt with noncompliance with outpt treatment 

## 2022-10-07 NOTE — BH INPATIENT PSYCHIATRY PROGRESS NOTE - NSBHATTESTBILLBASEDTIME_PSY_A_CORE
Time based billing

## 2022-10-07 NOTE — BH INPATIENT PSYCHIATRY PROGRESS NOTE - NSBHASSESSSUMMFT_PSY_ALL_CORE
Pt with long hx of bipolar disorder .  Pt with recent relapse to symptoms after attempt by his provider to decrease medication .  Pt currently with stable euthymic mood and affect is full and mood congruent.

## 2022-10-07 NOTE — BH INPATIENT PSYCHIATRY PROGRESS NOTE - NSBHMSEPERCEPT_PSY_A_CORE
No abnormalities
No abnormalities
Auditory hallucinations
No abnormalities
Auditory hallucinations
No abnormalities

## 2022-10-07 NOTE — BH INPATIENT PSYCHIATRY PROGRESS NOTE - NSTXIMPULSINTERMD_PSY_ALL_CORE
seroquel for mood and affect lability 
seroquel for mood and affect lability 
seroquel 
seroquel for mood and affect lability 
seroquel for mood and affect lability 
seroquel

## 2022-10-07 NOTE — BH INPATIENT PSYCHIATRY PROGRESS NOTE - NSBHMSETHTPROC_PSY_A_CORE
Disorganized/Tangential/Flight of ideas/Illogical/Impaired reasoning
Linear
Linear
Disorganized/Tangential/Flight of ideas/Illogical/Impaired reasoning
Linear
Linear

## 2022-10-07 NOTE — BH INPATIENT PSYCHIATRY PROGRESS NOTE - NSTXPSYCHOGOAL_PSY_ALL_CORE
Will identify 2 coping skills that assist with focus on reality
Will identify 2 coping skills that help mitigate hallucinations

## 2022-10-07 NOTE — BH INPATIENT PSYCHIATRY PROGRESS NOTE - NSICDXBHPRIMARYDX_PSY_ALL_CORE
Bipolar affective   F31.9  

## 2022-10-07 NOTE — BH INPATIENT PSYCHIATRY PROGRESS NOTE - NSBHCHARTREVIEWVS_PSY_A_CORE FT
Vital Signs Last 24 Hrs  T(C): 36.9 (10-07-22 @ 07:58), Max: 36.9 (10-07-22 @ 07:58)  T(F): 98.5 (10-07-22 @ 07:58), Max: 98.5 (10-07-22 @ 07:58)  HR: --  BP: --  BP(mean): --  RR: 16 (10-07-22 @ 07:58) (16 - 16)  SpO2: 100% (10-07-22 @ 07:58) (100% - 100%)    Orthostatic VS  10-07-22 @ 07:58  Lying BP: --/-- HR: --  Sitting BP: 108/78 HR: 78  Standing BP: 90/54 HR: 92  Site: upper right arm  Mode: electronic  Orthostatic VS  10-06-22 @ 07:18  Lying BP: --/-- HR: --  Sitting BP: 117/76 HR: 82  Standing BP: 110/74 HR: 86  Site: upper right arm  Mode: electronic

## 2022-10-07 NOTE — BH INPATIENT PSYCHIATRY PROGRESS NOTE - NSDCCRITERIA_PSY_ALL_CORE
pt with stable mood and affect , decreased hallucination and delusions 

## 2022-10-07 NOTE — BH INPATIENT PSYCHIATRY PROGRESS NOTE - NSBHATTESTBILLINGAW_PSY_A_CORE
Billing in another system

## 2022-10-07 NOTE — BH INPATIENT PSYCHIATRY PROGRESS NOTE - NSBHFUPINTERVALHXFT_PSY_A_CORE
Pt with improved goal directed speech Pt denies any suicidal ideation intent or plan Pt denies any side effects from medication . Pt with no behavioral dyscontrol

## 2022-10-18 DIAGNOSIS — Z88.0 ALLERGY STATUS TO PENICILLIN: ICD-10-CM

## 2022-10-18 DIAGNOSIS — Z91.51 PERSONAL HISTORY OF SUICIDAL BEHAVIOR: ICD-10-CM

## 2022-10-18 DIAGNOSIS — S01.91XA LACERATION WITHOUT FOREIGN BODY OF UNSPECIFIED PART OF HEAD, INITIAL ENCOUNTER: ICD-10-CM

## 2022-10-18 DIAGNOSIS — Z56.0 UNEMPLOYMENT, UNSPECIFIED: ICD-10-CM
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
## 2022-10-18 DIAGNOSIS — Z79.899 OTHER LONG TERM (CURRENT) DRUG THERAPY: ICD-10-CM

## 2022-10-18 DIAGNOSIS — I45.10 UNSPECIFIED RIGHT BUNDLE-BRANCH BLOCK: ICD-10-CM
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
## 2022-10-18 DIAGNOSIS — F31.89 OTHER BIPOLAR DISORDER: ICD-10-CM

## 2022-10-18 DIAGNOSIS — Z20.822 CONTACT WITH AND (SUSPECTED) EXPOSURE TO COVID-19: ICD-10-CM

## 2022-10-18 DIAGNOSIS — E86.0 DEHYDRATION: ICD-10-CM

## 2022-10-18 DIAGNOSIS — Z91.14 PATIENT'S OTHER NONCOMPLIANCE WITH MEDICATION REGIMEN: ICD-10-CM

## 2022-10-18 SDOH — ECONOMIC STABILITY - INCOME SECURITY: UNEMPLOYMENT, UNSPECIFIED: Z56.0

## 2023-03-10 NOTE — ED ADULT NURSE NOTE - NSFALLRSKUNASSIST_ED_ALL_ED
I PERSONALLY SAW THE PATIENT AND PERFORMED A SUBSTANTIVE PORTION OF THE VISIT INCLUDING ALL ASPECTS OF THE MEDICAL DECISION MAKING PROCESS. [address]      Pt Name: Bipin Neely  MRN: [id number]  [place] 1980  Date of evaluation: 3/9/2023  Provider: Low Phillips MD    CHIEF COMPLAINT       Chief Complaint   Patient presents with    Numbness     Pt states that he has been having jaw stiffness and jaw pain for the last 30 minutes. Pt states that he has been experiencing left sided arm numbness. Pt states that he has been vomiting all day and has been experiencing diarrhea. Pt denies taking blood thinners. Pt states that he has been experiencing chest pain for the last 30 minutes. HISTORY OF PRESENT ILLNESS    Bipin Neely is a 36 y.o. male who presents to the emergency department with multiple complaints. Patient presents with multiple complaints from home. Patient had loose watery stools all day today. No abdominal pain. About 30 minutes prior to arrival his jaw and joints and muscles started cramping up. Started spontaneously. Constant in nature. Never happened before. Endorses numbness and tingling as well. No weakness, facial droop, slurring of speech. No other associated symptoms. Nursing Notes were reviewed. Including nursing noted for FM, Surgical History, Past Medical History, Social History, vitals, and allergies; agree with all. REVIEW OF SYSTEMS       Review of Systems   Constitutional:  Negative for diaphoresis and unexpected weight change. HENT:  Negative for congestion, dental problem, drooling, ear discharge and ear pain. Eyes:  Negative for photophobia, pain, discharge, redness, itching and visual disturbance. Respiratory:  Negative for apnea, cough, choking, chest tightness, shortness of breath, wheezing and stridor.     Cardiovascular:  Negative for chest pain, palpitations and leg swelling. Gastrointestinal:  Positive for diarrhea. Negative for abdominal distention, anal bleeding, blood in stool, constipation and rectal pain. Endocrine: Negative for cold intolerance and heat intolerance. Genitourinary:  Negative for decreased urine volume and urgency. Musculoskeletal:  Positive for arthralgias and myalgias. Negative for back pain. Skin:  Negative for color change and pallor. Neurological:  Positive for numbness. Negative for facial asymmetry and weakness. Hematological:  Negative for adenopathy. Does not bruise/bleed easily. Psychiatric/Behavioral:  Negative for agitation, behavioral problems, confusion and decreased concentration. Except as noted above the remainder of the review of systems was reviewed and negative. PAST MEDICAL HISTORY     Past Medical History:   Diagnosis Date    Bursitis of knee     COVID     Gout 2019    Pneumothorax        SURGICAL HISTORY     History reviewed. No pertinent surgical history. CURRENT MEDICATIONS       Previous Medications    ALLOPURINOL (ZYLOPRIM) 300 MG TABLET    Take 1 tablet by mouth once a week    CYANOCOBALAMIN ([facility] VITAMIN B12) 1000 MCG TABLET    Take 1 tablet by mouth daily    FOLIC ACID (FOLVITE) 1 MG TABLET    Take 1 tablet by mouth daily    MULTIPLE VITAMINS-MINERALS (MULTIVITAMIN WITH MINERALS) TABLET    Take 1 tablet by mouth daily       ALLERGIES     Patient has no known allergies.     FAMILY HISTORY        Family History   Problem Relation Age of Onset    No Known Problems Mother     High Blood Pressure Father     High Cholesterol Father     Diabetes Father     No Known Problems Brother     Cancer Paternal Grandfather         thinks colon       SOCIAL HISTORY       Social History     Socioeconomic History    Marital status: Single     Spouse name: None    Number of children: None    Years of education: None    Highest education level: None   Tobacco Use    Smoking status: Former     Packs/day: 0.50     Types: Cigarettes     Quit date: 1/1/2013     Years since quitting: 10.1    Smokeless tobacco: Never   Vaping Use    Vaping Use: Never used   Substance and Sexual Activity    Alcohol use: Yes     Alcohol/week: 4.0 standard drinks     Types: 4 Cans of beer per week     Comment: social    Drug use: No    Sexual activity: Not Currently   Social History Narrative    Single, no children, works in construction--- electric, likes to [employer]       ED Triage Vitals [03/09/23 2347]   BP Temp Temp Source Heart Rate Resp SpO2 Height Weight   (!) 153/96 98.4 °F (36.9 °C) Temporal (!) 130 18 100 % -- --       Physical Exam  Vitals and nursing note reviewed. Constitutional:       General: He is not in acute distress. Appearance: He is well-developed. He is ill-appearing. He is not diaphoretic. HENT:      Head: Normocephalic and atraumatic. Eyes:      General:         Right eye: No discharge. Left eye: No discharge. Pupils: Pupils are equal, round, and reactive to light. Neck:      Thyroid: No thyromegaly. Trachea: No tracheal deviation. Cardiovascular:      Rate and Rhythm: Regular rhythm. Tachycardia present. Heart sounds: No murmur heard. Pulmonary:      Breath sounds: No wheezing or rales. Chest:      Chest wall: No tenderness. Abdominal:      General: There is no distension. Palpations: Abdomen is soft. There is no mass. Tenderness: There is no abdominal tenderness. There is no guarding or rebound. Musculoskeletal:         General: No tenderness or deformity. Normal range of motion. Cervical back: Normal range of motion. Skin:     General: Skin is warm. Coloration: Skin is not pale. Findings: No erythema or rash. Neurological:      Mental Status: He is alert. Cranial Nerves: No cranial nerve deficit. Motor: No abnormal muscle tone.       Coordination: Coordination normal.       DIAGNOSTIC RESULTS     EKG: All EKG's are interpreted by the Emergency Department Physician who either signs or Co-signs this chart in the absence of acardiologist.    Interpreted by myself     RADIOLOGY:   Non-plain film images such as CT, Ultrasoundand MRI are read by the radiologist. Plain radiographic images are visualized and preliminarily interpreted by the emergency physician with the below findings:    CT shows evidence of enterocolitis    ED BEDSIDE ULTRASOUND:   Performed by ED Physician - none    LABS:  Labs Reviewed   CBC WITH AUTO DIFFERENTIAL - Abnormal; Notable for the following components:       Result Value    WBC 13.5 (*)     RDW 12.1 (*)     Neutrophils Absolute 12.3 (*)     Lymphocytes Absolute 0.8 (*)     All other components within normal limits   COMPREHENSIVE METABOLIC PANEL - Abnormal; Notable for the following components:    Chloride 96 (*)     CO2 18 (*)     Anion Gap 24 (*)     Glucose 125 (*)     Creatinine 0.8 (*)     Total Protein 9.2 (*)     Albumin 5.2 (*)     ALT 61 (*)     AST 54 (*)     All other components within normal limits   CK - Abnormal; Notable for the following components: Total  (*)     All other components within normal limits   URINALYSIS WITH REFLEX TO CULTURE - Abnormal; Notable for the following components:    Ketones, Urine >=80 (*)     Blood, Urine TRACE-INTACT (*)     Protein, UA 30 (*)     All other components within normal limits   LACTIC ACID - Abnormal; Notable for the following components:    Lactic Acid 5.5 (*)     All other components within normal limits    Narrative:     Reyna Santana tel. [phone],  Chemistry results called to and read back by Karlos Bazan RN, 03/10/2023  01:02, by BLUSH   COVID-19, RAPID   MAGNESIUM   TROPONIN   URINE DRUG SCREEN   ETHANOL   MICROSCOPIC URINALYSIS       All other labs were withinnormal range or not returned as of this dictation.     EMERGENCY DEPARTMENT COURSE and DIFFERENTIAL DIAGNOSIS/MDM:     PMH, Surgical Hx, FH, Social Hx reviewed by myself (ETOH usage, Tobacco usage, Drug usage reviewed by myself, no pertinent Hx)- No Pertinent Hx     Old records were reviewed by me     MDM 74-year-old with sepsis. Fluids and antibiotics initiated. DDX-cramping dehydration enterocolitis  Social Determinants (Poverty, Education, uninsured) -patient has insurance  Prior notes-PMD notes reviewed  Name and route all medications-   Orders Placed This Encounter   Medications    0.9 % sodium chloride bolus    LORazepam (ATIVAN) injection 1 mg    diphenhydrAMINE (BENADRYL) injection 25 mg    iopamidol (ISOVUE-370) 76 % injection 75 mL       Charting interpretations all by myself-imaging as above  Diagnosis descriptions-severe cramping, dehydration  Consults-hospitalist  Disposition- Considered -admission    Is this patient to be included in the SEP-1 Core Measure due to severe sepsis or septic shock? Yes   SEP-1 CORE MEASURE DATA      Sepsis Criteria   Severe Sepsis Criteria   Septic Shock Criteria     Must be confirmed or suspected to move forward with diagnosis of sepsis. Must meet 2:    [] Temperature > 100.9 F (38.3 C)        or < 96.8 F (36 C)  [] HR > 90  [] RR > 20  [] WBC > 12 or < 4 or 10% bands      AND:      [] Infection Confirmed or        Suspected. Must meet 1:    [] Lactate > 2       or   [] Signs of Organ Dysfunction:    - SBP < 90 or MAP < 65  - Altered mental status  - Creatinine > 2 or increased from      baseline  - Urine Output < 0.5 ml/kg/hr  - Bilirubin > 2  - INR > 1.5 (not anticoagulated)  - Platelets < 479,720  - Acute Respiratory Failure as     evidenced by new need for NIPPV     or mechanical ventilation      [] No criteria met for Severe Sepsis. Must meet 1:    [] Lactate > 4        or   [] SBP < 90 or MAP < 65 for at        least two readings in the first        hour after fluid bolus        administration      [] Vasopressors initiated (if hypotension persists after fluid resuscitation)        [] No criteria met for Septic Shock.   Patient Vitals for the past 6 hrs:   BP Temp Pulse Resp SpO2   03/09/23 2347 (!) 153/96 98.4 °F (36.9 °C) (!) 130 18 100 %   03/10/23 0030 (!) 156/99 98.4 °F (36.9 °C) (!) 124 21 100 %      Recent Labs     03/10/23  0013   WBC 13.5*   LACTA 5.5*   CREATININE 0.8*   BILITOT 0.7            Time Severe Sepsis Identified: 202    Fluid Resuscitation Rational: at least 30mL/kg based on entered actual weight at time of triage    Repeat lactate level: ordered and pending at this time    Reassessment Exam:   Not applicable. Patient does not have septic shock.    I PERSONALLY SAW THE PATIENT AND PERFORMED A SUBSTANTIVE PORTION OF THE VISIT INCLUDING ALL ASPECTS OF THE MEDICAL DECISION MAKING PROCESS.    The primary clinician of record Liban Trammell     CRITICAL CARE TIME   Total Critical Caretime was 39 minutes, excluding separately reportable procedures.  There was a high probability of clinically significant/life threatening deterioration in the patient's condition which required my urgent intervention.      CRITICAL CARE  I personally cramping  saw the patient and independently provided 39 minutes of non-concurrent critical care out of the total shared critical care time provided. This excludes seperately billable procedures. Critical care time was provided for patient as above that required close evaluation and/or intervention with concern for potential patient decompensation.    PROCEDURES:  Unlessotherwise noted below, none    FINAL IMPRESSION      1. Numbness    2. Dehydration    3. Lactic acidosis    4. Enterocolitis    5. Cramp and spasm          DISPOSITION/PLAN   DISPOSITION      Admission      (Please note that portions ofthis note were completed with a voice recognition program.  Efforts were made to edit the dictations but occasionally words are mis-transcribed.)    Liban Trammell MD(electronically signed)  Attending Emergency Physician            Liban Trammell MD  03/10/23 2549     no

## 2023-09-06 NOTE — ED ADULT NURSE NOTE - LEARNS BEST
SUBJECTIVE:                                                                   Ashley Mcdaniels presents today to our Allergy Clinic at [facility] for a follow up visit.  She is a 42-year-old female with chronic idiopathic urticaria, history of Crohn's disease, hypothyroidism on thyroid hormone replacement, depression, and anxiety.    Problem   Chronic Urticaria    The patient developed hives at the end of 2016.  Failed high-dose antihistamines. Started omalizumab in 2018.  It has been beneficial.  She tried stopping it in 2019 and developed hives 6 weeks after stopping the medicine.         Chronic urticaria is fairly well controlled with omalizumab 300 mg every 14 weeks and cetirizine 10 mg by mouth twice daily.  She tried to take 10 mg by mouth once daily, and it does not control her symptoms well. She may develop an itchy skin by the end of the third week after receiving omalizumab injection.  If she misses 1 dose of cetirizine at that time, she will break out in hives.  As long as she is compliant with omalizumab and cetirizine twice daily regimen, she is doing well.    Patient Active Problem List   Diagnosis    YUE (generalized anxiety disorder)    Major depressive disorder, recurrent episode, mild (H)    Crohn's disease of both small and large intestine without complication (H)    IUD (intrauterine device) in place    Chronic urticaria    ADHD (attention deficit hyperactivity disorder), inattentive type    Gastroesophageal reflux disease without esophagitis    Morbid obesity (H)    Segmental dysfunction of sacral region    Segmental dysfunction of lumbar region    Segmental dysfunction of thoracic region    Bilateral low back pain with left-sided sciatica    PAD (peripheral artery disease) (H)    Dry hair    Dry skin    Immunosuppressed status (H)    Venous insufficiency of right leg       Past Medical History:   Diagnosis Date    Acquired hypothyroidism     ADHD (attention deficit  hyperactivity disorder)     Inattentive, dx 2/2012     Crohn's colitis (H)     YUE (generalized anxiety disorder)     GERD (gastroesophageal reflux disease)     Low back pain     Major depressive disorder, recurrent episode, mild (H)       Problem (# of Occurrences) Relation (Name,Age of Onset)    Substance Abuse (4) Maternal Grandmother, Maternal Grandfather, Paternal Grandfather, Brother    Mental Illness (1) Brother    Arthritis (1) Mother    Asthma (2) Father, Paternal Grandmother    Diabetes (2) Maternal Grandmother, Paternal Grandmother    Heart Disease (2) Father: CAD, CHF, Paternal Grandmother    Hypertension (3) Father, Maternal Grandmother, Paternal Grandmother    Hyperlipidemia (2) Father, Maternal Grandmother    Deep Vein Thrombosis (1) Mother    Liver Cancer (1) Father    Pancreatic Cancer (1) Father    Alcoholism (1) Father          Past Surgical History:   Procedure Laterality Date    CHOLECYSTECTOMY  Feb 2007    DILATION AND CURETTAGE  May 2003    Non-OB    HC TOOTH EXTRACTION W/FORCEP  Nov 2006    LAP ADJUSTABLE GASTRIC BAND  May 2007     Social History     Socioeconomic History    Marital status:      Spouse name: None    Number of children: None    Years of education: None    Highest education level: None   Occupational History     Comment: consulting   Tobacco Use    Smoking status: Former    Smokeless tobacco: Never    Tobacco comments:     Vapes daily   Vaping Use    Vaping Use: Every day   Substance and Sexual Activity    Alcohol use: Yes     Comment: 2x a week    Drug use: No    Sexual activity: Yes     Partners: Male     Birth control/protection: I.U.D.   Other Topics Concern    Parent/sibling w/ CABG, MI or angioplasty before 65F 55M? No   Social History Narrative    09/06/23        ENVIRONMENTAL HISTORY: [place] The home is heated with a forced air. They do have central air conditioning. The patient's bedroom is furnished with carpeting  in bedroom.  Pets inside the house include 1 cat(s)outdoor cat, 1 dog(s), and 2 horses. There is no history of cockroach or mice infestation. There is/are 0 smokers in the house.  The house does not have a damp basement.            Review of Systems   Constitutional:  Negative for activity change, chills, fatigue and fever.   HENT:  Negative for congestion, nosebleeds, postnasal drip, rhinorrhea, sinus pressure, sneezing and sore throat.    Eyes:  Negative for discharge, redness and itching.   Respiratory:  Negative for cough, chest tightness, shortness of breath and wheezing.    Cardiovascular:  Negative for chest pain.   Gastrointestinal:  Negative for abdominal pain, constipation, diarrhea, nausea and vomiting.   Musculoskeletal:  Negative for joint swelling.   Skin:  Negative for rash.   Neurological:  Negative for dizziness, light-headedness and headaches.   Hematological:  Negative for adenopathy.   Psychiatric/Behavioral:  Negative for behavioral problems. The patient is not nervous/anxious.            Current Outpatient Medications:     bacitracin 500 UNIT/GM external ointment, Apply topically 2 times daily, Disp: 425 g, Rfl: 0    buPROPion (WELLBUTRIN XL) 300 MG 24 hr tablet, TAKE ONE TABLET BY MOUTH EVERY MORNING, Disp: 180 tablet, Rfl: 0    cetirizine (ZYRTEC) 10 MG tablet, Take 1 tablet (10 mg) by mouth daily, Disp: 30 tablet, Rfl: 0    cyanocobalamin (CYANOCOBALAMIN) 1000 MCG/ML injection, Inject 1,000 mcg into the muscle, Disp: , Rfl:     cyclobenzaprine (FLEXERIL) 5 MG tablet, Take 1-2 tablets (5-10 mg) by mouth nightly as needed for muscle spasms, Disp: 60 tablet, Rfl: 3    EPINEPHrine (ANY BX GENERIC EQUIV) 0.3 MG/0.3ML injection 2-pack, Inject 0.3 mLs (0.3 mg) into the muscle once as needed for anaphylaxis, Disp: 0.6 mL, Rfl: 0    HUMIRA *CF* PEN 40 MG/0.4ML pen kit, , Disp: , Rfl: 1    hydrOXYzine (ATARAX) 50 MG tablet, Take 1-2 tablets ( mg) by mouth every 6 hours as needed for anxiety or  other (sleep), Disp: 60 tablet, Rfl: 2    levofloxacin (LEVAQUIN) 750 MG tablet, Take 1 tablet (750 mg) by mouth daily, Disp: 10 tablet, Rfl: 0    levonorgestrel (MIRENA) 20 MCG/24HR IUD, 1 each (20 mcg) by Intrauterine route continuous, Disp: , Rfl:     valACYclovir (VALTREX) 500 MG tablet, Take 1 tablet (500 mg) by mouth 2 times daily, Disp: 40 tablet, Rfl: 5    amoxicillin-clavulanate (AUGMENTIN) 875-125 MG tablet, Take 1 tablet by mouth 2 times daily (Patient not taking: Reported on 9/6/2023), Disp: 20 tablet, Rfl: 0    oxyCODONE (ROXICODONE) 5 MG tablet, Take 1 tablet (5 mg) by mouth every 6 hours as needed for pain (Patient not taking: Reported on 9/6/2023), Disp: 10 tablet, Rfl: 0    Current Facility-Administered Medications:     omalizumab (XOLAIR) injection 300 mg, 300 mg, Subcutaneous, Q28 Days, Toñito Desir MD, 300 mg at 09/06/23 1442  Immunization History   Administered Date(s) Administered    COVID-19 Bivalent 12+ (Pfizer) 10/03/2022    COVID-19 MONOVALENT 12+ (Pfizer) 01/20/2021, 02/10/2021, 11/10/2021    FLU 6-35 months 10/16/2002, 11/28/2003    Flu, Unspecified 10/16/2002, 11/28/2003    Hepatitis A (ADULT 19+) 12/05/2017    Historical Hepb 10/12/2015, 12/05/2017    Influenza (IIV3) PF 11/02/2005, 10/17/2006, 11/07/2007, 10/21/2008    Influenza Vaccine >6 months (Alfuria,Fluzone) 10/12/2015, 02/17/2020, 10/03/2022    Mantoux Tuberculin Skin Test 10/26/2015    Pneumo Conj 13-V (2010&after) 04/05/2021    Pneumococcal 23 valent 02/09/2018    TDAP (Adacel,Boostrix) 10/03/2022    TDAP Vaccine (Adacel) 09/07/2012     Allergies   Allergen Reactions    Azathioprine Other (See Comments)     pancreatitis    No Clinical Screening - See Comments Hives     From stress     OBJECTIVE:                                                                 /71 (BP Location: Left arm, Patient Position: Sitting, Cuff Size: Adult Large)   Pulse 83   SpO2 98%         Physical Exam  Vitals and nursing note reviewed.    Constitutional:       General: She is not in acute distress.     Appearance: She is not ill-appearing, toxic-appearing or diaphoretic.   HENT:      Head: Normocephalic and atraumatic.      Right Ear: Tympanic membrane, ear canal and external ear normal.      Left Ear: Tympanic membrane, ear canal and external ear normal.      Nose: No mucosal edema, congestion or rhinorrhea.      Right Turbinates: Not enlarged, swollen or pale.      Left Turbinates: Not enlarged, swollen or pale.      Mouth/Throat:      Lips: Pink.      Mouth: Mucous membranes are moist.      Pharynx: Oropharynx is clear. No pharyngeal swelling, oropharyngeal exudate, posterior oropharyngeal erythema or uvula swelling.   Eyes:      General:         Right eye: No discharge.         Left eye: No discharge.      Conjunctiva/sclera: Conjunctivae normal.   Cardiovascular:      Rate and Rhythm: Normal rate and regular rhythm.      Heart sounds: Normal heart sounds. No murmur heard.  Pulmonary:      Effort: Pulmonary effort is normal. No respiratory distress.      Breath sounds: Normal breath sounds and air entry. No stridor, decreased air movement or transmitted upper airway sounds. No decreased breath sounds, wheezing, rhonchi or rales.   Skin:     General: Skin is warm.      Comments: No active urticaria or angioedema noted on today's exam.   Neurological:      Mental Status: She is alert and oriented to person, place, and time.   Psychiatric:         Mood and Affect: Mood normal.         Behavior: Behavior normal.           ASSESSMENT/PLAN:    Chronic urticaria    Currently fairly well controlled with omalizumab 300 mg every 4 weeks and cetirizine 10 mg by mouth twice daily.  -Continue as is.    - EPINEPHrine (ANY BX GENERIC EQUIV) 0.3 MG/0.3ML injection 2-pack  Dispense: 0.6 mL; Refill: 0       Follow-up in 1 year or sooner if it is needed.    Thank you for allowing us to participate in the care of this patient. Please feel free to contact us if  there are any questions or concerns about the patient.    Disclaimer: This note consists of symbols derived from keyboarding, dictation and/or voice recognition software. As a result, there may be errors in the script that have gone undetected. Please consider this when interpreting information found in this chart.    Toñito Desir MD, FAAAAI, FACAAI  Allergy, Asthma and Immunology     [facility]     Visual

## 2023-10-04 NOTE — BH INPATIENT PSYCHIATRY ASSESSMENT NOTE - PAST PSYCHOTROPIC MEDICATION
MEDICATIONS  (PRN):  acetaminophen     Tablet .. 650 milliGRAM(s) Oral every 6 hours PRN Temp greater or equal to 38C (100.4F), Mild Pain (1 - 3), Moderate Pain (4 - 6), Severe Pain (7 - 10)  aluminum hydroxide/magnesium hydroxide/simethicone Suspension 30 milliLiter(s) Oral every 6 hours PRN Dyspepsia  diphenhydrAMINE 50 milliGRAM(s) Oral every 6 hours PRN agitation/EPS  diphenhydrAMINE Injectable 50 milliGRAM(s) IntraMuscular once PRN agitation  haloperidol     Tablet 2 milliGRAM(s) Oral every 6 hours PRN agitation  haloperidol    Injectable 2 milliGRAM(s) IntraMuscular Once PRN agitation  magnesium hydroxide Suspension 30 milliLiter(s) Oral daily PRN Constipation   unable to assess  by record lamictal, wellbutrin, seroquel

## 2023-11-10 ENCOUNTER — EMERGENCY (EMERGENCY)
Facility: HOSPITAL | Age: 48
LOS: 0 days | Discharge: HOME | End: 2023-11-10
Attending: EMERGENCY MEDICINE
Payer: MEDICARE

## 2023-11-10 VITALS — WEIGHT: 164.91 LBS | HEIGHT: 70 IN

## 2023-11-10 VITALS
DIASTOLIC BLOOD PRESSURE: 88 MMHG | RESPIRATION RATE: 18 BRPM | HEART RATE: 101 BPM | SYSTOLIC BLOOD PRESSURE: 130 MMHG | OXYGEN SATURATION: 99 %

## 2023-11-10 DIAGNOSIS — F41.9 ANXIETY DISORDER, UNSPECIFIED: ICD-10-CM

## 2023-11-10 DIAGNOSIS — R42 DIZZINESS AND GIDDINESS: ICD-10-CM

## 2023-11-10 DIAGNOSIS — F31.9 BIPOLAR DISORDER, UNSPECIFIED: ICD-10-CM

## 2023-11-10 DIAGNOSIS — Z56.0 UNEMPLOYMENT, UNSPECIFIED: ICD-10-CM

## 2023-11-10 DIAGNOSIS — R41.3 OTHER AMNESIA: ICD-10-CM

## 2023-11-10 DIAGNOSIS — Z20.822 CONTACT WITH AND (SUSPECTED) EXPOSURE TO COVID-19: ICD-10-CM

## 2023-11-10 DIAGNOSIS — Z98.890 OTHER SPECIFIED POSTPROCEDURAL STATES: Chronic | ICD-10-CM

## 2023-11-10 DIAGNOSIS — G47.00 INSOMNIA, UNSPECIFIED: ICD-10-CM

## 2023-11-10 DIAGNOSIS — R00.0 TACHYCARDIA, UNSPECIFIED: ICD-10-CM

## 2023-11-10 LAB
ALBUMIN SERPL ELPH-MCNC: 3.7 G/DL — SIGNIFICANT CHANGE UP (ref 3.3–5)
ALBUMIN SERPL ELPH-MCNC: 3.7 G/DL — SIGNIFICANT CHANGE UP (ref 3.3–5)
ALP SERPL-CCNC: 88 U/L — SIGNIFICANT CHANGE UP (ref 40–120)
ALP SERPL-CCNC: 88 U/L — SIGNIFICANT CHANGE UP (ref 40–120)
ALT FLD-CCNC: 43 U/L — SIGNIFICANT CHANGE UP (ref 12–78)
ALT FLD-CCNC: 43 U/L — SIGNIFICANT CHANGE UP (ref 12–78)
AMPHET UR-MCNC: NEGATIVE — SIGNIFICANT CHANGE UP
AMPHET UR-MCNC: NEGATIVE — SIGNIFICANT CHANGE UP
ANION GAP SERPL CALC-SCNC: 6 MMOL/L — SIGNIFICANT CHANGE UP (ref 5–17)
ANION GAP SERPL CALC-SCNC: 6 MMOL/L — SIGNIFICANT CHANGE UP (ref 5–17)
APAP SERPL-MCNC: < 2 UG/ML (ref 10–30)
APAP SERPL-MCNC: < 2 UG/ML (ref 10–30)
AST SERPL-CCNC: 22 U/L — SIGNIFICANT CHANGE UP (ref 15–37)
AST SERPL-CCNC: 22 U/L — SIGNIFICANT CHANGE UP (ref 15–37)
BARBITURATES UR SCN-MCNC: NEGATIVE — SIGNIFICANT CHANGE UP
BARBITURATES UR SCN-MCNC: NEGATIVE — SIGNIFICANT CHANGE UP
BASOPHILS # BLD AUTO: 0.03 K/UL — SIGNIFICANT CHANGE UP (ref 0–0.2)
BASOPHILS # BLD AUTO: 0.03 K/UL — SIGNIFICANT CHANGE UP (ref 0–0.2)
BASOPHILS NFR BLD AUTO: 0.6 % — SIGNIFICANT CHANGE UP (ref 0–2)
BASOPHILS NFR BLD AUTO: 0.6 % — SIGNIFICANT CHANGE UP (ref 0–2)
BENZODIAZ UR-MCNC: NEGATIVE — SIGNIFICANT CHANGE UP
BENZODIAZ UR-MCNC: NEGATIVE — SIGNIFICANT CHANGE UP
BILIRUB SERPL-MCNC: 0.6 MG/DL — SIGNIFICANT CHANGE UP (ref 0.2–1.2)
BILIRUB SERPL-MCNC: 0.6 MG/DL — SIGNIFICANT CHANGE UP (ref 0.2–1.2)
BUN SERPL-MCNC: 6 MG/DL — LOW (ref 7–23)
BUN SERPL-MCNC: 6 MG/DL — LOW (ref 7–23)
CALCIUM SERPL-MCNC: 9.1 MG/DL — SIGNIFICANT CHANGE UP (ref 8.5–10.1)
CALCIUM SERPL-MCNC: 9.1 MG/DL — SIGNIFICANT CHANGE UP (ref 8.5–10.1)
CHLORIDE SERPL-SCNC: 110 MMOL/L — HIGH (ref 96–108)
CHLORIDE SERPL-SCNC: 110 MMOL/L — HIGH (ref 96–108)
CO2 SERPL-SCNC: 25 MMOL/L — SIGNIFICANT CHANGE UP (ref 22–31)
CO2 SERPL-SCNC: 25 MMOL/L — SIGNIFICANT CHANGE UP (ref 22–31)
COCAINE METAB.OTHER UR-MCNC: NEGATIVE — SIGNIFICANT CHANGE UP
COCAINE METAB.OTHER UR-MCNC: NEGATIVE — SIGNIFICANT CHANGE UP
CREAT SERPL-MCNC: 1.07 MG/DL — SIGNIFICANT CHANGE UP (ref 0.5–1.3)
CREAT SERPL-MCNC: 1.07 MG/DL — SIGNIFICANT CHANGE UP (ref 0.5–1.3)
EGFR: 86 ML/MIN/1.73M2 — SIGNIFICANT CHANGE UP
EGFR: 86 ML/MIN/1.73M2 — SIGNIFICANT CHANGE UP
EOSINOPHIL # BLD AUTO: 0 K/UL — SIGNIFICANT CHANGE UP (ref 0–0.5)
EOSINOPHIL # BLD AUTO: 0 K/UL — SIGNIFICANT CHANGE UP (ref 0–0.5)
EOSINOPHIL NFR BLD AUTO: 0 % — SIGNIFICANT CHANGE UP (ref 0–6)
EOSINOPHIL NFR BLD AUTO: 0 % — SIGNIFICANT CHANGE UP (ref 0–6)
ETHANOL SERPL-MCNC: <10 MG/DL — SIGNIFICANT CHANGE UP (ref 0–10)
ETHANOL SERPL-MCNC: <10 MG/DL — SIGNIFICANT CHANGE UP (ref 0–10)
GLUCOSE SERPL-MCNC: 118 MG/DL — HIGH (ref 70–99)
GLUCOSE SERPL-MCNC: 118 MG/DL — HIGH (ref 70–99)
HCT VFR BLD CALC: 41.3 % — SIGNIFICANT CHANGE UP (ref 39–50)
HCT VFR BLD CALC: 41.3 % — SIGNIFICANT CHANGE UP (ref 39–50)
HGB BLD-MCNC: 14.8 G/DL — SIGNIFICANT CHANGE UP (ref 13–17)
HGB BLD-MCNC: 14.8 G/DL — SIGNIFICANT CHANGE UP (ref 13–17)
IMM GRANULOCYTES NFR BLD AUTO: 0.2 % — SIGNIFICANT CHANGE UP (ref 0–0.9)
IMM GRANULOCYTES NFR BLD AUTO: 0.2 % — SIGNIFICANT CHANGE UP (ref 0–0.9)
LYMPHOCYTES # BLD AUTO: 0.53 K/UL — LOW (ref 1–3.3)
LYMPHOCYTES # BLD AUTO: 0.53 K/UL — LOW (ref 1–3.3)
LYMPHOCYTES # BLD AUTO: 11.1 % — LOW (ref 13–44)
LYMPHOCYTES # BLD AUTO: 11.1 % — LOW (ref 13–44)
MCHC RBC-ENTMCNC: 30.1 PG — SIGNIFICANT CHANGE UP (ref 27–34)
MCHC RBC-ENTMCNC: 30.1 PG — SIGNIFICANT CHANGE UP (ref 27–34)
MCHC RBC-ENTMCNC: 35.8 GM/DL — SIGNIFICANT CHANGE UP (ref 32–36)
MCHC RBC-ENTMCNC: 35.8 GM/DL — SIGNIFICANT CHANGE UP (ref 32–36)
MCV RBC AUTO: 83.9 FL — SIGNIFICANT CHANGE UP (ref 80–100)
MCV RBC AUTO: 83.9 FL — SIGNIFICANT CHANGE UP (ref 80–100)
METHADONE UR-MCNC: NEGATIVE — SIGNIFICANT CHANGE UP
METHADONE UR-MCNC: NEGATIVE — SIGNIFICANT CHANGE UP
MONOCYTES # BLD AUTO: 0.46 K/UL — SIGNIFICANT CHANGE UP (ref 0–0.9)
MONOCYTES # BLD AUTO: 0.46 K/UL — SIGNIFICANT CHANGE UP (ref 0–0.9)
MONOCYTES NFR BLD AUTO: 9.6 % — SIGNIFICANT CHANGE UP (ref 2–14)
MONOCYTES NFR BLD AUTO: 9.6 % — SIGNIFICANT CHANGE UP (ref 2–14)
NEUTROPHILS # BLD AUTO: 3.74 K/UL — SIGNIFICANT CHANGE UP (ref 1.8–7.4)
NEUTROPHILS # BLD AUTO: 3.74 K/UL — SIGNIFICANT CHANGE UP (ref 1.8–7.4)
NEUTROPHILS NFR BLD AUTO: 78.5 % — HIGH (ref 43–77)
NEUTROPHILS NFR BLD AUTO: 78.5 % — HIGH (ref 43–77)
OPIATES UR-MCNC: NEGATIVE — SIGNIFICANT CHANGE UP
OPIATES UR-MCNC: NEGATIVE — SIGNIFICANT CHANGE UP
PCP SPEC-MCNC: SIGNIFICANT CHANGE UP
PCP SPEC-MCNC: SIGNIFICANT CHANGE UP
PCP UR-MCNC: NEGATIVE — SIGNIFICANT CHANGE UP
PCP UR-MCNC: NEGATIVE — SIGNIFICANT CHANGE UP
PLATELET # BLD AUTO: 255 K/UL — SIGNIFICANT CHANGE UP (ref 150–400)
PLATELET # BLD AUTO: 255 K/UL — SIGNIFICANT CHANGE UP (ref 150–400)
POTASSIUM SERPL-MCNC: 3.8 MMOL/L — SIGNIFICANT CHANGE UP (ref 3.5–5.3)
POTASSIUM SERPL-MCNC: 3.8 MMOL/L — SIGNIFICANT CHANGE UP (ref 3.5–5.3)
POTASSIUM SERPL-SCNC: 3.8 MMOL/L — SIGNIFICANT CHANGE UP (ref 3.5–5.3)
POTASSIUM SERPL-SCNC: 3.8 MMOL/L — SIGNIFICANT CHANGE UP (ref 3.5–5.3)
PROT SERPL-MCNC: 8.1 GM/DL — SIGNIFICANT CHANGE UP (ref 6–8.3)
PROT SERPL-MCNC: 8.1 GM/DL — SIGNIFICANT CHANGE UP (ref 6–8.3)
RBC # BLD: 4.92 M/UL — SIGNIFICANT CHANGE UP (ref 4.2–5.8)
RBC # BLD: 4.92 M/UL — SIGNIFICANT CHANGE UP (ref 4.2–5.8)
RBC # FLD: 11.9 % — SIGNIFICANT CHANGE UP (ref 10.3–14.5)
RBC # FLD: 11.9 % — SIGNIFICANT CHANGE UP (ref 10.3–14.5)
SALICYLATES SERPL-MCNC: <1.7 MG/DL — LOW (ref 2.8–20)
SALICYLATES SERPL-MCNC: <1.7 MG/DL — LOW (ref 2.8–20)
SARS-COV-2 RNA SPEC QL NAA+PROBE: SIGNIFICANT CHANGE UP
SARS-COV-2 RNA SPEC QL NAA+PROBE: SIGNIFICANT CHANGE UP
SODIUM SERPL-SCNC: 141 MMOL/L — SIGNIFICANT CHANGE UP (ref 135–145)
SODIUM SERPL-SCNC: 141 MMOL/L — SIGNIFICANT CHANGE UP (ref 135–145)
THC UR QL: NEGATIVE — SIGNIFICANT CHANGE UP
THC UR QL: NEGATIVE — SIGNIFICANT CHANGE UP
TSH SERPL-MCNC: 0.31 UU/ML — LOW (ref 0.34–4.82)
TSH SERPL-MCNC: 0.31 UU/ML — LOW (ref 0.34–4.82)
WBC # BLD: 4.77 K/UL — SIGNIFICANT CHANGE UP (ref 3.8–10.5)
WBC # BLD: 4.77 K/UL — SIGNIFICANT CHANGE UP (ref 3.8–10.5)
WBC # FLD AUTO: 4.77 K/UL — SIGNIFICANT CHANGE UP (ref 3.8–10.5)
WBC # FLD AUTO: 4.77 K/UL — SIGNIFICANT CHANGE UP (ref 3.8–10.5)

## 2023-11-10 PROCEDURE — 99284 EMERGENCY DEPT VISIT MOD MDM: CPT

## 2023-11-10 PROCEDURE — 84443 ASSAY THYROID STIM HORMONE: CPT

## 2023-11-10 PROCEDURE — 90792 PSYCH DIAG EVAL W/MED SRVCS: CPT

## 2023-11-10 PROCEDURE — 99285 EMERGENCY DEPT VISIT HI MDM: CPT

## 2023-11-10 PROCEDURE — 36415 COLL VENOUS BLD VENIPUNCTURE: CPT

## 2023-11-10 PROCEDURE — 80307 DRUG TEST PRSMV CHEM ANLYZR: CPT

## 2023-11-10 PROCEDURE — 80053 COMPREHEN METABOLIC PANEL: CPT

## 2023-11-10 PROCEDURE — 93010 ELECTROCARDIOGRAM REPORT: CPT

## 2023-11-10 PROCEDURE — 87635 SARS-COV-2 COVID-19 AMP PRB: CPT

## 2023-11-10 PROCEDURE — 85025 COMPLETE CBC W/AUTO DIFF WBC: CPT

## 2023-11-10 PROCEDURE — 93005 ELECTROCARDIOGRAM TRACING: CPT

## 2023-11-10 RX ORDER — CLONAZEPAM 1 MG
1 TABLET ORAL
Qty: 5 | Refills: 0
Start: 2023-11-10 | End: 2023-11-14

## 2023-11-10 SDOH — ECONOMIC STABILITY - INCOME SECURITY: UNEMPLOYMENT, UNSPECIFIED: Z56.0

## 2023-11-10 NOTE — ED BEHAVIORAL HEALTH ASSESSMENT NOTE - SUMMARY
Patient is a 47M, domiciled with wife, unemployed (previously a teacher), PMHx of IBS, PPHX of Bipolar I with psychotic features, hx of SAs in setting of psychosis, hx of inpatient admissions, last at  July,2022, remote hx of substance use, no known legal issues, brought in by wife for insomnia r/t recent med changes, was previously on Ativan and Clonazepam but taken of Clonazepam and has been anxious with poor sleep for the past 2 days. Psychiatry consulted for evaluation.    Patient presents with anxiety 2/2 medication changes. No SHIIP. No AVH. No s/s psychosis. Patient is not an acute threat to self of others and does not warrant for an inpatient admission, he declined voluntary admission. He is motivated to follow-up with outpatient psychiatrist, or possibly returning to prior psychiatrist. Patient is agreeable to discharge plan and able to engage in safety planning.

## 2023-11-10 NOTE — ED PROVIDER NOTE - CLINICAL SUMMARY MEDICAL DECISION MAKING FREE TEXT BOX
46 yo male w/ history of bipolar 1 presents to the ED for insomnia and heightened mental state in the setting of coming off on Klonopin and Ativan recently. Pt and family requesting psych consult. Will check psych consult, labs, medical clearance and reassess. Will also monitor for benzo withdrawal. 46 yo male w/ history of bipolar 1 presents to the ED for insomnia and heightened mental state in the setting of coming off on Klonopin and Ativan recently. Pt and family requesting psych consult. Will check psych consult, labs, medical clearance and reassess. Will also monitor for benzo withdrawal.  All labs imaging reviewed by myself.  Patient seen by psychiatry offered voluntary admission patient states he does not want to stay he would prefer to go home psych recommending starting Klonopin 1 mg at night for a few days until he follows up with his primary care psychiatrist patient given a copy of reports and told for need for follow-up discussed with patient and family at bedside were comfortable with the discharge. no signs of withdrawal at this time.

## 2023-11-10 NOTE — ED BEHAVIORAL HEALTH ASSESSMENT NOTE - REASON FOR REFERRAL
bipolar insomnia pt and [facility] requesting psych pt states I think i need admission

## 2023-11-10 NOTE — ED BEHAVIORAL HEALTH ASSESSMENT NOTE - NSBHATTESTAPPBILLTIME_PSY_A_CORE
I attest my time as SHAHRZAD is greater than 50% of the total combined time spent on qualifying patient care activities. I have reviewed and verified the documentation.
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
## 2023-11-10 NOTE — ED PROVIDER NOTE - PHYSICAL EXAMINATION
Constitutional: NAD AAOx3  Eyes: PERRLA EOMI  Head: Normocephalic atraumatic  Mouth: MMM  Cardiac: tachycardic   Resp: unlabored breathing  GI: Abd s/nt/nd  Neuro: grossly normal and intact  Skin: No visible rashes

## 2023-11-10 NOTE — ED BEHAVIORAL HEALTH ASSESSMENT NOTE - DETAILS
Denies T&R Patient instructed to return to the ED or call 911 if patient experiences SI, HI, hopelessness, worsening of symptoms or has any other concerns.

## 2023-11-10 NOTE — ED PROVIDER NOTE - NS_ ATTENDINGSCRIBEDETAILS _ED_A_ED_FT
I, Sly Lawrence MD,  performed the initial face to face bedside interview with this patient regarding history of present illness, review of symptoms and relevant past medical, social and family history.  I completed an independent physical examination.  I was the initial provider who evaluated this patient.   I personally saw the patient and performed a substantive portion of the visit including all aspects of the medical decision making.  The history, relevant review of systems, past medical and surgical history, medical decision making, and physical examination was documented by the scribe in my presence and I attest to the accuracy of the documentation.

## 2023-11-10 NOTE — ED PROVIDER NOTE - PATIENT PORTAL LINK FT
You can access the FollowMyHealth Patient Portal offered by [facility] by registering at the following website: [email or web address]. By joining [facility]’s FollowMyHealth portal, you will also be able to view your health information using other applications (apps) compatible with our system.

## 2023-11-10 NOTE — ED ADULT NURSE NOTE - OBJECTIVE STATEMENT
Pt is A&Ox4 and endorses hx of Bipolar disorder, pt states his tele psych dr wanted to change his medication from clonopin and ativan which were controlling his anxiety to gabapentin to help him sleep at night. however pt reports the gabapentin was giving him side effects like short term memory loss, stopping in his tracks, and physical impairment. Pt has stopped taking the gabapentin and wants to find a new regimen for his medications that he "can still feel like himself" but also control his anxiety and help with sleep as he has not slept in 2 nights. Pt also states they would like blood work for thyroid as he has not had a physical in over a year.

## 2023-11-10 NOTE — ED ADULT NURSE NOTE - NSFALLUNIVINTERV_ED_ALL_ED
Bed/Stretcher in lowest position, wheels locked, appropriate side rails in place/Call bell, personal items and telephone in reach/Instruct patient to call for assistance before getting out of bed/chair/stretcher/Non-slip footwear applied when patient is off stretcher/Stacy to call system/Physically safe environment - no spills, clutter or unnecessary equipment/Purposeful proactive rounding/Room/bathroom lighting operational, light cord in reach

## 2023-11-10 NOTE — ED ADULT NURSE NOTE - NS ED NURSE LEVEL OF CONSCIOUSNESS ORIENTATION
Goal Outcome Evaluation:  Plan of Care Reviewed With: patient           Outcome Summary: alert, disoriented to place time and situation, VSS, HR 90s, up with assistance of one and walker to BR, voiding, refuses SCDs, continuous pulse ox and telemetry, up multiple time  in the night, c/o neck and back pain, scheduled tylenol and prn percocet given with relief, dressing to previous AILYN drain site to L chest area dry and intact, left mastectomy incisional wound healing, bed alarm set, safety maintained   Oriented - self; Oriented - place; Oriented - time

## 2023-11-10 NOTE — ED PROVIDER NOTE - OBJECTIVE STATEMENT
46 yo male w/PMHx IBS and Bipolar 1 disorder presents to the ED with medication issues. Pt reports his psychiatrist took him off of Klonopin and ativan and switched the pt to gabapentin. Pt has been taking gabapentin for 2 days and reports he is having an increase in trouble sleeping and had some memory loss, dizziness, an lightheadedness. Pt's psychiatrist is away on vacation until November 30th so he is unable to see his psychiatrist until he returns. Pt did take Ativan this morning, but is concerned he is going through benzo withdrawal as he has been weaning off of his medications slowly. Pt starting to feel more anxious gradually and is unsure of what he wants to do in terms of his medication regimen. Pt denying any SI or HI in the ED.

## 2023-11-10 NOTE — ED BEHAVIORAL HEALTH NOTE - BEHAVIORAL HEALTH NOTE
Confidential Drug Utilization Report  Search Terms: lizzeth chavez, 1975Search Date: 11/10/2023 11:10:04 AM  The Drug Utilization Report below displays all of the controlled substance prescriptions, if any, that your patient has filled in the last twelve months. The information displayed on this report is compiled from pharmacy submissions to the Department, and accurately reflects the information as submitted by the pharmacies.    This report was requested by: Chacha Fernandes | Reference #: [id number]    Practitioner Count: 2  Pharmacy Count: 2  Current Opioid Prescriptions: 0  Current Benzodiazepine Prescriptions: 2  Current Stimulant Prescriptions: 0      Patient Demographic Information (PDI)       PDI	First Name	Last Name	Birth Date	Gender	Street Address	[facility] Code  A	Lizzeth Chavez	1975	Male	[address]	[address]  B	Lizzeth Chavez	1975	Male	[address] 	[facility]	[address]    Prescription Information      PDI Filter:    PDI	My Rx	Current Rx	Drug Type	Rx Written	Rx Dispensed	Drug	Quantity	Days Supply	Prescriber Name	Prescriber ANUM #	Payment Method	Dispenser  A	N	Y	B	11/07/2023	11/07/2023	temazepam 30 mg capsule	30	30	Antonio Levy DO	[id number]	Medicare	[id number]  A	N	Y	B	10/31/2023	10/31/2023	lorazepam 2 mg tablet	30	30	Antonio Levy DO	[id number]	Medicare	[id number]  A	N	N	B	10/23/2023	10/23/2023	clonazepam 0.5 mg tablet	14	14	Antonio Levy DO	[id number]	Medicare	[facility] [id number]  A	N	N	B	10/09/2023	10/11/2023	temazepam 7.5 mg capsule	10	10	Antonio Levy DO	[id number]	[facility] [id number]  A	N	N	B	10/03/2023	10/04/2023	temazepam 15 mg capsule	30	30	Antonio Levy DO	[id number]	Medicare	[facility] [id number]  A	N	N	B	09/21/2023	09/21/2023	temazepam 15 mg capsule	14	14	Antonio Levy DO	[id number]	Medicare	[facility] [id number]  A	N	N	B	09/09/2023	09/13/2023	clonazepam 1 mg tablet	30	30	Cristhian [id number]	Medicare	[facility] [id number]  A	N	N	B	09/09/2023	09/13/2023	clonazepam 0.5 mg tablet	30	30	Devyn Cristhian Reeves	[id number]	Medicare	[facility] [id number]  A	N	N	B	07/28/2023	08/02/2023	clonazepam 0.5 mg tablet	30	30	Devyn Cristhian Reeves	[id number]	Medicare	[facility] [id number]  A	N	N	B	07/28/2023	08/02/2023	clonazepam 1 mg tablet	30	30	Devyn Cristhian Reeves	[id number]	Medicare	[facility] [id number]  A	N	N	B	07/01/2023	07/03/2023	clonazepam 1 mg tablet	60	30	Devyn Cristhian Reeves	[id number]	Medicare	[facility] [id number]  A	N	N		05/09/2023	05/11/2023	zaleplon 10 mg capsule	30	30	Eugenia Sanches MD	[id number]	Medicare	[facility] [id number]  A	N	N	B	05/04/2023	05/05/2023	clonazepam 0.5 mg tablet	60	30	Toniabria Cristhian Reeves	[id number]	Medicare	[facility] [id number]  A	N	N	B	05/04/2023	05/05/2023	clonazepam 1 mg tablet	60	30	Devyn Cristhian Reeves	[id number]	Medicare	[facility] [id number]  A	N	N	B	04/02/2023	04/05/2023	clonazepam 1 mg tablet	60	30	Devyn Cristhian Reeves	[id number]	Medicare	[facility] [id number]  A	N	N	B	04/02/2023	04/05/2023	clonazepam 0.5 mg tablet	60	30	Calabria Cristhian Tyo	[id number]	Medicare	[facility] [id number]  A	N	N	B	03/04/2023	03/06/2023	clonazepam 0.5 mg tablet	60	30	Calabria, Cristhian Campbellnaro	[id number]	Medicare	[facility] [id number]  A	N	N	B	03/04/2023	03/06/2023	clonazepam 1 mg tablet	60	30	Toniabria, Cristhian Tyo	[id number]	Medicare	[facility] [id number]  A	N	N	B	02/04/2023	02/05/2023	clonazepam 0.5 mg tablet	60	30	[facility]	[id number]	Medicare	[facility] [id number]  A	N	N	B	02/04/2023	02/05/2023	clonazepam 1 mg tablet	60	30	[facility]	[id number]	Medicare	[facility] [id number]  A	N	N	B	01/03/2023	01/07/2023	clonazepam 0.5 mg tablet	60	30	[facility] Cristhian Leandro	[id number]	Medicare	[facility] [id number]  A	N	N	B	01/03/2023	01/07/2023	clonazepam 1 mg tablet	60	30	[facility]	[id number]	Medicare	[facility] [id number]  A	N	N	B	12/03/2022	12/06/2022	clonazepam 0.5 mg tablet	60	30	[facility]	[id number]	Medicare	[facility] [id number]  A	N	N	B	12/03/2022	12/06/2022	clonazepam 1 mg tablet	60	30	[facility]	[id number]	Medicare	[facility] [id number]  B	N	N	B	06/13/2023	06/13/2023	diazepam 10 mg tablet	1	1	Ric Greenwood	[id number]	Medicare	[facility] [id number]

## 2023-11-10 NOTE — ED BEHAVIORAL HEALTH ASSESSMENT NOTE - NSSUICPROTFACT_PSY_ALL_CORE
Responsibility to children, family, or others/Identifies reasons for living/Supportive social network of family or friends/Positive therapeutic relationships/Unable to assess

## 2023-11-10 NOTE — ED ADULT TRIAGE NOTE - CHIEF COMPLAINT QUOTE
Pt. presents to ED c/o medication issues. Pt. reports his psychiatrist took him off clonopin and ativan and switched him to gabapentin. Pt. took gabapentin for 2 days and reports adverse effects so did not take it last night. Pt. reports difficulty sleeping and cannot get in contact with his psychiatrist. Denies SI/HI

## 2023-11-10 NOTE — ED PROVIDER NOTE - NSFOLLOWUPINSTRUCTIONS_ED_ALL_ED_FT
1. return for worsening symptoms or anything concerning to you  2. take all home meds as prescribed  3. follow up with your pmd call to make an appointment  4. follow up with your psychiatrist  5. follow up regarding your thyroid level with your pmd  6. you were offered voluntary admission. if you change your mind return to the ED

## 2023-11-10 NOTE — ED BEHAVIORAL HEALTH ASSESSMENT NOTE - DESCRIPTION
lives with wife; unemployed 2/2 mental illness Vital Signs Last 24 Hrs  T(C): 37 (10 Nov 2023 09:32), Max: 37 (10 Nov 2023 09:32)  T(F): 98.6 (10 Nov 2023 09:32), Max: 98.6 (10 Nov 2023 09:32)  HR: 101 (10 Nov 2023 12:45) (101 - 130)  BP: 158/100 (10 Nov 2023 09:32) (158/100 - 158/100)  BP(mean): 116 (10 Nov 2023 09:32) (116 - 116)  RR: 19 (10 Nov 2023 09:32) (19 - 19)  SpO2: 100% (10 Nov 2023 09:32) (100% - 100%)    Parameters below as of 10 Nov 2023 09:32  Patient On (Oxygen Delivery Method): room air IBS

## 2023-11-10 NOTE — ED PROVIDER NOTE - NS ED ROS FT
Constitutional: No fever or chills  Eyes: No visual changes  HEENT: No throat pain  CV: No chest pain  Resp: No SOB no cough  GI: No abd pain, nausea or vomiting  : No dysuria  MSK: No musculoskeletal pain  Skin: No rash  Neuro: No headache Constitutional: No fever or chills  Eyes: No visual changes  HEENT: No throat pain  CV: No chest pain  Resp: No SOB no cough  GI: No abd pain, nausea or vomiting  : No dysuria  MSK: No musculoskeletal pain  Skin: No rash  Neuro: No headache  psych: insomnia anxiety

## 2023-11-10 NOTE — ED ADULT NURSE NOTE - HPI (INCLUDE ILLNESS QUALITY, SEVERITY, DURATION, TIMING, CONTEXT, MODIFYING FACTORS, ASSOCIATED SIGNS AND SYMPTOMS)
Pt is A&Ox4 and endorses hx of Bipolar disorder, pt states his tele psych dr wanted to change his medication from clonopin and ativan which were controlling his anxiety to gabapentin to help him sleep at night. however pt reports the gabapentin was giving him side effects like short term memory loss, stopping in his tracks, and physical impairment. Pt has stopped taking the gabapentin and wants to find a new regimen for his medications that he "can still feel like himself" but also control his anxiety and help with sleep as he has not slept in 2 nights.

## 2024-03-25 NOTE — ED BEHAVIORAL HEALTH ASSESSMENT NOTE - NSBHMSEGROOM_PSY_A_CORE
[facility]  Pediatric Hematology/Oncology Procedure Note    Date of Service: 03/25/24     Pre-op diagnosis: Acute Lymphoblastic Leukemia  Post-op diagnosis: same.  Procedure: lumbar puncture with intrathecal chemotherapy.  Anesthesia: Moderate sedation and monitoring by sedation team  Blood administered: none/not applicable.  Estimated blood loss: none/not applicable.  Complications: none apparent.  Findings: none/not applicable.  Implants or grafts: none/not applicable.  Specimens removed: cerebrospinal fluid for cell count.  Surgeon/Proceduralist: Silvio  Assistant: none/not applicable.    Consent obtained.  Time out completed.    Sterile prep/procedure.  Local with xylocaine.  Spinal tap performed with 22 gauge spinal needle.  Clear CSF sent for cell count.  IT Methotrexate 12 mg given without problem.    Tolerated well.    Peter Anguiano MD, MS  Attending Physician  Pediatric Hematology/Oncology  [facility] [address] [address]  [phone] (office)  [phone] (fax)     Good

## 2024-03-31 NOTE — ED ADULT TRIAGE NOTE - PAIN RATING/NUMBER SCALE (0-10): ACTIVITY
0
GENERAL: no acute distress, non-toxic appearing  HEENT: PERRLA, EOMI, normal conjunctiva, oral mucosa moist  CARDIAC: regular rate and rhythm  PULM: clear to ascultation bilaterally, no rhonchi, or wheezing  GI: abdomen nondistended, soft, nontender, +splenomegaly  NEURO: alert, moving all extremities  MSK: no visible deformities  SKIN: no visible rashes, dry, well-perfused, +mild jaundice
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
## 2024-04-15 NOTE — ED BEHAVIORAL HEALTH ASSESSMENT NOTE - GENERAL APPEARANCE
Patient is seen and examined at the bed side, is afebrile. The ortho and Hem/Onc follow up recommendation noted. The WBC count and kidney function is normal       REVIEW OF SYSTEMS: All other review systems are negative      ALLERGIES: No Known Allergies      Vital Signs Last 24 Hrs  T(C): 37.1 (15 Apr 2024 13:10), Max: 37.3 (14 Apr 2024 20:51)  T(F): 98.8 (15 Apr 2024 13:10), Max: 99.1 (14 Apr 2024 20:51)  HR: 87 (15 Apr 2024 13:10) (85 - 93)  BP: 152/96 (15 Apr 2024 13:10) (142/89 - 152/96)  BP(mean): --  RR: 18 (15 Apr 2024 13:10) (18 - 18)  SpO2: 95% (15 Apr 2024 13:10) (95% - 95%)    Parameters below as of 15 Apr 2024 13:10  Patient On (Oxygen Delivery Method): nasal cannula  O2 Flow (L/min): 2      PHYSICAL EXAM:  GENERAL: Not in distress, back on oxygen via NC   CHEST/LUNG: Not using accessory muscles   HEART: s1 and s2 present  ABDOMEN:  Nontender and  Nondistended  EXTREMITIES: No pedal  edema  CNS: Awake and Alert      LABS:                        12.1   10.01 )-----------( 282      ( 15 Apr 2024 07:00 )             37.0                           13.4   12.28 )-----------( 227      ( 13 Apr 2024 06:15 )             39.8                04-15    136  |  97  |  12  ----------------------------<  115<H>  3.5   |  32<H>  |  0.44<L>    Ca    8.6      15 Apr 2024 07:00  Phos  3.4     04-15  Mg     2.5     04-15    TPro  6.5  /  Alb  2.7<L>  /  TBili  0.5  /  DBili  x   /  AST  16  /  ALT  19  /  AlkPhos  113  04-15    04-14    137  |  98  |  10  ----------------------------<  104<H>  3.3<L>   |  34<H>  |  0.45<L>    Ca    8.5      14 Apr 2024 05:55  Phos  2.9     04-14  Mg     2.2     04-14    TPro  6.7  /  Alb  2.9<L>  /  TBili  0.7  /  DBili  x   /  AST  19  /  ALT  20  /  AlkPhos  109  04-14  PT/INR - ( 10 Apr 2024 05:10 )   PT: 10.9 sec;   INR: 0.95 ratio       PTT - ( 10 Apr 2024 05:10 )  PTT:29.3 sec      MEDICATIONS  (STANDING):    albuterol/ipratropium for Nebulization 3 milliLiter(s) Nebulizer every 6 hours  budesonide 160 MICROgram(s)/formoterol 4.5 MICROgram(s) Inhaler 2 Puff(s) Inhalation two times a day  chlorhexidine 2% Cloths 1 Application(s) Topical <User Schedule>  gabapentin 100 milliGRAM(s) Oral every 8 hours  heparin   Injectable 5000 Unit(s) SubCutaneous every 8 hours  levothyroxine 112 MICROGram(s) Oral daily  lidocaine   4% Patch 2 Patch Transdermal daily  losartan 50 milliGRAM(s) Oral daily  morphine ER Tablet 15 milliGRAM(s) Oral every 12 hours  naloxone Injectable 0.4 milliGRAM(s) IV Push once  nicotine -   7 mG/24Hr(s) Patch 1 Patch Transdermal daily  piperacillin/tazobactam IVPB.. 3.375 Gram(s) IV Intermittent every 8 hours  polyethylene glycol 3350 17 Gram(s) Oral daily  senna 2 Tablet(s) Oral at bedtime        RADIOLOGY & ADDITIONAL TESTS:    4/9/24 : CT Chest No Cont (04.09.24 @ 17:06) Axial CT images of the chest are obtained without intravenous administration of contrast.      IMPRESSION: Loss of height of multiple thoracic and lumbar spine vertebral bodies which are of indeterminate age. Clinical correlation for   back pain is recommended. Dedicated spinal MRI can be performed for complete evaluation.    Dominant cluster of nodular and patchy opacities within the superior segment of the right lower lobe suggestive of infection with   endobronchial spread. A 1-3 month follow-up noncontrast chest CT is recommended to ensure improvement/resolution.    Emphysema.    Intrahepatic and extrahepatic biliary ductal dilatation is of unclear etiology. Upper abdominal ultrasound can be performedfor further   evaluation as clinically warranted.      4/10/24: CT Head No Cont (04.10.24 @ 15:55) IMPRESSION: Age-appropriate involutional and ischemic gliotic changes. No   hemorrhage. No change since 4/9/2024.      4/9/24 : Xray Femur 2 Views, Left (04.09.24 @ 20:35) 1. Diffuse bony demineralization associated with a fracture along the   intertrochanteric line of the proximal left femur, with only slight upward migration of the distal segment, but less than one half bone width.  2. Degenerative arthropathy of the left knee.    4/9/24: Xray Pelvis AP only (04.09.24 @ 20:35) 1. Diffuse bony demineralization with a fracture along the   intertrochanteric line of the proximal left femur. No dislocation of the hips.        MICROBIOLOGY DATA:    MRSA/MSSA PCR (04.11.24 @ 07:02)   MRSA PCR Result.: NotDetec:    Legionella pneumophila Antigen, Urine (04.10.24 @ 05:40)   Legionella Antigen, Urine: Negative:      Respiratory Viral Panel with COVID-19 by SHAHANA (04.09.24 @ 21:24)   Rapid RVP Result: NotDetec  SARS-CoV-2: NotDetec:                              Deformities present
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
## 2024-08-08 NOTE — ED ADULT NURSE NOTE - NS ED [facility] OTHER STREET DRUGS MEDICATION
Sent portal message to confirm 14 day refill of Lexapro and appt needed for further refills. Appt confirmed for Wed 8/28/24 @ 7:40 am with Dr gr   None known

## 2024-12-19 NOTE — ED ADULT TRIAGE NOTE - HEIGHT IN CM
Plan:   Medications sent to pharmacy  Start oral steroids tomorrow morning  Start an anti-inflammatory tomorrow morning such as Alleve, ibuprofen or your meloxicam  Muscle relaxer may cause drowsiness.  Best to take at bedtime only.  Do not drink alcohol or drive when taking it.  No heavy lifting or strenuous activities until symptoms have resolved  Contact this clinic with any concerns   177.8

## 2025-01-16 ENCOUNTER — EMERGENCY (EMERGENCY)
Facility: HOSPITAL | Age: 50
LOS: 1 days | Discharge: ANOTHER HOSPITAL | End: 2025-01-16
Attending: EMERGENCY MEDICINE | Admitting: EMERGENCY MEDICINE
Payer: MEDICARE

## 2025-01-16 VITALS
SYSTOLIC BLOOD PRESSURE: 146 MMHG | HEART RATE: 117 BPM | OXYGEN SATURATION: 98 % | DIASTOLIC BLOOD PRESSURE: 82 MMHG | WEIGHT: 179.9 LBS | TEMPERATURE: 98 F | RESPIRATION RATE: 22 BRPM

## 2025-01-16 DIAGNOSIS — Z98.890 OTHER SPECIFIED POSTPROCEDURAL STATES: Chronic | ICD-10-CM

## 2025-01-16 LAB
AMPHET UR-MCNC: NEGATIVE — SIGNIFICANT CHANGE UP
ANION GAP SERPL CALC-SCNC: 14 MMOL/L — SIGNIFICANT CHANGE UP (ref 5–17)
APAP SERPL-MCNC: <1 UG/ML — LOW (ref 10–30)
APPEARANCE UR: CLEAR — SIGNIFICANT CHANGE UP
BARBITURATES UR SCN-MCNC: NEGATIVE — SIGNIFICANT CHANGE UP
BASOPHILS # BLD AUTO: 0.03 K/UL — SIGNIFICANT CHANGE UP (ref 0–0.2)
BASOPHILS NFR BLD AUTO: 0.9 % — SIGNIFICANT CHANGE UP (ref 0–2)
BENZODIAZ UR-MCNC: NEGATIVE — SIGNIFICANT CHANGE UP
BILIRUB UR-MCNC: NEGATIVE — SIGNIFICANT CHANGE UP
BUN SERPL-MCNC: 4 MG/DL — LOW (ref 7–23)
CALCIUM SERPL-MCNC: 9.3 MG/DL — SIGNIFICANT CHANGE UP (ref 8.4–10.5)
CHLORIDE SERPL-SCNC: 100 MMOL/L — SIGNIFICANT CHANGE UP (ref 96–108)
CO2 SERPL-SCNC: 22 MMOL/L — SIGNIFICANT CHANGE UP (ref 22–31)
COCAINE METAB.OTHER UR-MCNC: NEGATIVE — SIGNIFICANT CHANGE UP
COLOR SPEC: YELLOW — SIGNIFICANT CHANGE UP
CREAT SERPL-MCNC: 1.29 MG/DL — SIGNIFICANT CHANGE UP (ref 0.5–1.3)
DIFF PNL FLD: NEGATIVE — SIGNIFICANT CHANGE UP
EGFR: 68 ML/MIN/1.73M2 — SIGNIFICANT CHANGE UP
EOSINOPHIL # BLD AUTO: 0.01 K/UL — SIGNIFICANT CHANGE UP (ref 0–0.5)
EOSINOPHIL NFR BLD AUTO: 0.3 % — SIGNIFICANT CHANGE UP (ref 0–6)
ETHANOL SERPL-MCNC: <3 MG/DL — SIGNIFICANT CHANGE UP (ref 0–3)
GLUCOSE SERPL-MCNC: 100 MG/DL — HIGH (ref 70–99)
GLUCOSE UR QL: NEGATIVE MG/DL — SIGNIFICANT CHANGE UP
HCT VFR BLD CALC: 37.6 % — LOW (ref 39–50)
HGB BLD-MCNC: 13.5 G/DL — SIGNIFICANT CHANGE UP (ref 13–17)
IMM GRANULOCYTES NFR BLD AUTO: 0.3 % — SIGNIFICANT CHANGE UP (ref 0–0.9)
KETONES UR-MCNC: NEGATIVE MG/DL — SIGNIFICANT CHANGE UP
LEUKOCYTE ESTERASE UR-ACNC: NEGATIVE — SIGNIFICANT CHANGE UP
LYMPHOCYTES # BLD AUTO: 0.97 K/UL — LOW (ref 1–3.3)
LYMPHOCYTES # BLD AUTO: 29.8 % — SIGNIFICANT CHANGE UP (ref 13–44)
MAGNESIUM SERPL-MCNC: 2.3 MG/DL — SIGNIFICANT CHANGE UP (ref 1.6–2.6)
MCHC RBC-ENTMCNC: 29.6 PG — SIGNIFICANT CHANGE UP (ref 27–34)
MCHC RBC-ENTMCNC: 35.9 G/DL — SIGNIFICANT CHANGE UP (ref 32–36)
MCV RBC AUTO: 82.5 FL — SIGNIFICANT CHANGE UP (ref 80–100)
METHADONE UR-MCNC: NEGATIVE — SIGNIFICANT CHANGE UP
MONOCYTES # BLD AUTO: 0.31 K/UL — SIGNIFICANT CHANGE UP (ref 0–0.9)
MONOCYTES NFR BLD AUTO: 9.5 % — SIGNIFICANT CHANGE UP (ref 2–14)
NEUTROPHILS # BLD AUTO: 1.92 K/UL — SIGNIFICANT CHANGE UP (ref 1.8–7.4)
NEUTROPHILS NFR BLD AUTO: 59.2 % — SIGNIFICANT CHANGE UP (ref 43–77)
NITRITE UR-MCNC: NEGATIVE — SIGNIFICANT CHANGE UP
NRBC # BLD: 0 /100 WBCS — SIGNIFICANT CHANGE UP (ref 0–0)
OPIATES UR-MCNC: NEGATIVE — SIGNIFICANT CHANGE UP
PCP SPEC-MCNC: SIGNIFICANT CHANGE UP
PCP UR-MCNC: NEGATIVE — SIGNIFICANT CHANGE UP
PH UR: 8 — SIGNIFICANT CHANGE UP (ref 5–8)
PLATELET # BLD AUTO: 287 K/UL — SIGNIFICANT CHANGE UP (ref 150–400)
POTASSIUM SERPL-MCNC: 3.2 MMOL/L — LOW (ref 3.5–5.3)
POTASSIUM SERPL-SCNC: 3.2 MMOL/L — LOW (ref 3.5–5.3)
PROT UR-MCNC: NEGATIVE MG/DL — SIGNIFICANT CHANGE UP
RBC # BLD: 4.56 M/UL — SIGNIFICANT CHANGE UP (ref 4.2–5.8)
RBC # FLD: 12.3 % — SIGNIFICANT CHANGE UP (ref 10.3–14.5)
SALICYLATES SERPL-MCNC: 0.6 MG/DL — LOW (ref 3–30)
SODIUM SERPL-SCNC: 136 MMOL/L — SIGNIFICANT CHANGE UP (ref 135–145)
SP GR SPEC: 1.01 — SIGNIFICANT CHANGE UP (ref 1–1.03)
THC UR QL: NEGATIVE — SIGNIFICANT CHANGE UP
TSH SERPL-MCNC: 0.46 UIU/ML — SIGNIFICANT CHANGE UP (ref 0.36–3.74)
UROBILINOGEN FLD QL: 1 MG/DL — SIGNIFICANT CHANGE UP (ref 0.2–1)
WBC # BLD: 3.25 K/UL — LOW (ref 3.8–10.5)
WBC # FLD AUTO: 3.25 K/UL — LOW (ref 3.8–10.5)

## 2025-01-16 PROCEDURE — 36415 COLL VENOUS BLD VENIPUNCTURE: CPT

## 2025-01-16 PROCEDURE — 84443 ASSAY THYROID STIM HORMONE: CPT

## 2025-01-16 PROCEDURE — 93010 ELECTROCARDIOGRAM REPORT: CPT

## 2025-01-16 PROCEDURE — 70450 CT HEAD/BRAIN W/O DYE: CPT | Mod: 26

## 2025-01-16 PROCEDURE — 70450 CT HEAD/BRAIN W/O DYE: CPT | Mod: MC

## 2025-01-16 PROCEDURE — 81003 URINALYSIS AUTO W/O SCOPE: CPT

## 2025-01-16 PROCEDURE — 84132 ASSAY OF SERUM POTASSIUM: CPT

## 2025-01-16 PROCEDURE — 93005 ELECTROCARDIOGRAM TRACING: CPT

## 2025-01-16 PROCEDURE — 87637 SARSCOV2&INF A&B&RSV AMP PRB: CPT

## 2025-01-16 PROCEDURE — 99291 CRITICAL CARE FIRST HOUR: CPT | Mod: 25

## 2025-01-16 PROCEDURE — 83735 ASSAY OF MAGNESIUM: CPT

## 2025-01-16 PROCEDURE — 80048 BASIC METABOLIC PNL TOTAL CA: CPT

## 2025-01-16 PROCEDURE — 90792 PSYCH DIAG EVAL W/MED SRVCS: CPT | Mod: 2W

## 2025-01-16 PROCEDURE — 85025 COMPLETE CBC W/AUTO DIFF WBC: CPT

## 2025-01-16 PROCEDURE — 80307 DRUG TEST PRSMV CHEM ANLYZR: CPT

## 2025-01-16 PROCEDURE — 96372 THER/PROPH/DIAG INJ SC/IM: CPT

## 2025-01-16 PROCEDURE — 99291 CRITICAL CARE FIRST HOUR: CPT

## 2025-01-16 RX ORDER — LORAZEPAM 1 MG/1
2 TABLET ORAL ONCE
Refills: 0 | Status: DISCONTINUED | OUTPATIENT
Start: 2025-01-16 | End: 2025-01-16

## 2025-01-16 RX ORDER — HALOPERIDOL DECANOATE 50 MG/ML
5 INJECTION INTRAMUSCULAR ONCE
Refills: 0 | Status: COMPLETED | OUTPATIENT
Start: 2025-01-16 | End: 2025-01-16

## 2025-01-16 RX ORDER — DIPHENHYDRAMINE HCL 25 MG
25 TABLET ORAL ONCE
Refills: 0 | Status: COMPLETED | OUTPATIENT
Start: 2025-01-16 | End: 2025-01-16

## 2025-01-16 RX ORDER — POTASSIUM CHLORIDE 600 MG/1
40 TABLET, FILM COATED, EXTENDED RELEASE ORAL ONCE
Refills: 0 | Status: COMPLETED | OUTPATIENT
Start: 2025-01-16 | End: 2025-01-16

## 2025-01-16 RX ADMIN — Medication 25 MILLIGRAM(S): at 23:04

## 2025-01-16 RX ADMIN — LORAZEPAM 2 MILLIGRAM(S): 1 TABLET ORAL at 15:02

## 2025-01-16 RX ADMIN — LORAZEPAM 2 MILLIGRAM(S): 1 TABLET ORAL at 23:04

## 2025-01-16 RX ADMIN — POTASSIUM CHLORIDE 40 MILLIEQUIVALENT(S): 600 TABLET, FILM COATED, EXTENDED RELEASE ORAL at 20:56

## 2025-01-16 RX ADMIN — LORAZEPAM 2 MILLIGRAM(S): 1 TABLET ORAL at 21:59

## 2025-01-16 RX ADMIN — HALOPERIDOL DECANOATE 5 MILLIGRAM(S): 50 INJECTION INTRAMUSCULAR at 15:03

## 2025-01-16 RX ADMIN — HALOPERIDOL DECANOATE 5 MILLIGRAM(S): 50 INJECTION INTRAMUSCULAR at 21:58

## 2025-01-16 RX ADMIN — Medication 25 MILLIGRAM(S): at 21:59
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
## 2025-01-16 NOTE — ED PROVIDER NOTE - CLINICAL SUMMARY MEDICAL DECISION MAKING FREE TEXT BOX
Medical Decision Making:  - Upon assessing the patient's severe manic symptoms, I decided to address his distress promptly to avoid further escalation. Given his description of medication noncompliance, our primary suspicion was that this incident is a result of such noncompliance.  Verbal de-escalation tactics failed and the patient was a harm to himself due to extreme agitation. We provided an initial dose of Ativan to help alleviate the immediate severity of symptoms, then proceeded with a preliminary assessment. Questions assessing his intent to self-harm or harm others returned negative. Blood work plans were advised to gain insight into possible biochemical imbalances that might have contributed to this event. As per the information, the patients are advised to stick to his prescribed dosage regime without missing, to control his symptoms. Safety of the patient and others around him was the priority in this decision-making process.    Impression:  - Acute manic episode likely precipitated by medication noncompliance
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
## 2025-01-16 NOTE — ED ADULT NURSE NOTE - CHIEF COMPLAINT QUOTE
Pt BIBA from home with manic episode running down the street naked as per PD. Pt states he didn't take his medications for couple days. 1:1 started at this time
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
## 2025-01-16 NOTE — ED BEHAVIORAL HEALTH ASSESSMENT NOTE - PAST PSYCHOTROPIC MEDICATION
Double lumen PICC placed in right brachial vein, 38 cm in length, 0 cm exposed with arm circumference 33 cm. Lot# [id number]   unable to assess

## 2025-01-16 NOTE — ED PROVIDER NOTE - PHYSICAL EXAMINATION
ATTENDING PHYSICAL EXAM DR. BROWN ***GEN - extremely agitated ***HEAD - NC/AT ***EYES/NOSE - PERRL, EOMI, mucous membranes moist, no discharge ***THROAT: Oral cavity and pharynx normal. No inflammation, swelling, exudate, or lesions.  ***NECK: Neck supple, non-tender without lymphadenopathy, no masses, no thyromegaly.   ***PULMONARY - CTA b/l, symmetric breath sounds. ***CARDIAC -s1s2,  tacky,no M,G,R  ***ABDOMEN - +BS, ND, NT, soft, no guarding, no rebound, no masses   ***BACK - no CVA tenderness, Normal  spine ***EXTREMITIES - symmetric pulses, 2+ dp, capillary refill < 2 seconds, no clubbing, no cyanosis, no edema ***SKIN - no rash or bruising   ***NEUROLOGIC - alert, moving all extremities ***PSYCH - insight and judgment  abnormal,  racing thoughts, delusional,memory nl
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
## 2025-01-16 NOTE — ED PROVIDER NOTE - PROGRESS NOTE DETAILS
The patient has no signs of focal neurological deficits that would be attributable to his recent TBI

## 2025-01-17 ENCOUNTER — INPATIENT (INPATIENT)
Facility: HOSPITAL | Age: 50
LOS: 6 days | Discharge: HOME | End: 2025-01-24
Attending: STUDENT IN AN ORGANIZED HEALTH CARE EDUCATION/TRAINING PROGRAM | Admitting: STUDENT IN AN ORGANIZED HEALTH CARE EDUCATION/TRAINING PROGRAM
Payer: MEDICARE

## 2025-01-17 VITALS
TEMPERATURE: 99 F | OXYGEN SATURATION: 98 % | HEART RATE: 80 BPM | RESPIRATION RATE: 16 BRPM | SYSTOLIC BLOOD PRESSURE: 123 MMHG | DIASTOLIC BLOOD PRESSURE: 75 MMHG

## 2025-01-17 VITALS — WEIGHT: 164.91 LBS | HEIGHT: 70 IN | TEMPERATURE: 99 F

## 2025-01-17 DIAGNOSIS — Z98.890 OTHER SPECIFIED POSTPROCEDURAL STATES: Chronic | ICD-10-CM

## 2025-01-17 DIAGNOSIS — F30.2 MANIC EPISODE, SEVERE WITH PSYCHOTIC SYMPTOMS: ICD-10-CM

## 2025-01-17 LAB
FLUAV AG NPH QL: SIGNIFICANT CHANGE UP
FLUBV AG NPH QL: SIGNIFICANT CHANGE UP
POTASSIUM SERPL-MCNC: 4.3 MMOL/L — SIGNIFICANT CHANGE UP (ref 3.5–5.3)
POTASSIUM SERPL-SCNC: 4.3 MMOL/L — SIGNIFICANT CHANGE UP (ref 3.5–5.3)
RSV RNA NPH QL NAA+NON-PROBE: SIGNIFICANT CHANGE UP
SARS-COV-2 RNA SPEC QL NAA+PROBE: SIGNIFICANT CHANGE UP
VALPROATE SERPL-MCNC: <3.15 UG/ML — LOW (ref 50–100)

## 2025-01-17 PROCEDURE — 99223 1ST HOSP IP/OBS HIGH 75: CPT | Mod: GC

## 2025-01-17 RX ORDER — GABAPENTIN 800 MG/1
800 TABLET ORAL AT BEDTIME
Refills: 0 | Status: DISCONTINUED | OUTPATIENT
Start: 2025-01-17 | End: 2025-01-24

## 2025-01-17 RX ORDER — LAMOTRIGINE 100 MG/1
375 TABLET ORAL DAILY
Refills: 0 | Status: DISCONTINUED | OUTPATIENT
Start: 2025-01-17 | End: 2025-01-24

## 2025-01-17 RX ORDER — GABAPENTIN 800 MG/1
300 TABLET ORAL EVERY 8 HOURS
Refills: 0 | Status: DISCONTINUED | OUTPATIENT
Start: 2025-01-17 | End: 2025-01-17

## 2025-01-17 RX ORDER — ACETAMINOPHEN, DIPHENHYDRAMINE HCL, PHENYLEPHRINE HCL 325; 25; 5 MG/1; MG/1; MG/1
3 TABLET ORAL AT BEDTIME
Refills: 0 | Status: DISCONTINUED | OUTPATIENT
Start: 2025-01-17 | End: 2025-01-17

## 2025-01-17 RX ORDER — QUETIAPINE FUMARATE 300 MG/1
400 TABLET ORAL AT BEDTIME
Refills: 0 | Status: DISCONTINUED | OUTPATIENT
Start: 2025-01-17 | End: 2025-01-17

## 2025-01-17 RX ORDER — ACETAMINOPHEN, DIPHENHYDRAMINE HCL, PHENYLEPHRINE HCL 325; 25; 5 MG/1; MG/1; MG/1
3 TABLET ORAL AT BEDTIME
Refills: 0 | Status: DISCONTINUED | OUTPATIENT
Start: 2025-01-17 | End: 2025-01-24

## 2025-01-17 RX ORDER — HALOPERIDOL 10 MG/1
5 TABLET ORAL EVERY 6 HOURS
Refills: 0 | Status: DISCONTINUED | OUTPATIENT
Start: 2025-01-17 | End: 2025-01-24

## 2025-01-17 RX ORDER — HALOPERIDOL 10 MG/1
5 TABLET ORAL ONCE
Refills: 0 | Status: DISCONTINUED | OUTPATIENT
Start: 2025-01-17 | End: 2025-01-24

## 2025-01-17 RX ORDER — GABAPENTIN 800 MG/1
300 TABLET ORAL AT BEDTIME
Refills: 0 | Status: DISCONTINUED | OUTPATIENT
Start: 2025-01-17 | End: 2025-01-17

## 2025-01-17 RX ORDER — CLONAZEPAM 2 MG
2 TABLET ORAL AT BEDTIME
Refills: 0 | Status: DISCONTINUED | OUTPATIENT
Start: 2025-01-17 | End: 2025-01-17

## 2025-01-17 RX ORDER — LITHIUM CARBONATE 300 MG
600 CAPSULE ORAL AT BEDTIME
Refills: 0 | Status: DISCONTINUED | OUTPATIENT
Start: 2025-01-17 | End: 2025-01-21

## 2025-01-17 RX ORDER — QUETIAPINE FUMARATE 300 MG/1
800 TABLET ORAL AT BEDTIME
Refills: 0 | Status: DISCONTINUED | OUTPATIENT
Start: 2025-01-17 | End: 2025-01-24

## 2025-01-17 RX ORDER — VALPROIC ACID 250 MG
500 CAPSULE ORAL AT BEDTIME
Refills: 0 | Status: DISCONTINUED | OUTPATIENT
Start: 2025-01-17 | End: 2025-01-17

## 2025-01-17 RX ORDER — SENNOSIDES 8.6 MG
2 TABLET ORAL AT BEDTIME
Refills: 0 | Status: DISCONTINUED | OUTPATIENT
Start: 2025-01-17 | End: 2025-01-24

## 2025-01-17 RX ORDER — QUETIAPINE FUMARATE 300 MG/1
200 TABLET ORAL DAILY
Refills: 0 | Status: DISCONTINUED | OUTPATIENT
Start: 2025-01-17 | End: 2025-01-17

## 2025-01-17 RX ADMIN — GABAPENTIN 800 MILLIGRAM(S): 800 TABLET ORAL at 21:15

## 2025-01-17 RX ADMIN — LAMOTRIGINE 375 MILLIGRAM(S): 100 TABLET ORAL at 09:04

## 2025-01-17 RX ADMIN — Medication 600 MILLIGRAM(S): at 20:35

## 2025-01-17 RX ADMIN — QUETIAPINE FUMARATE 800 MILLIGRAM(S): 300 TABLET ORAL at 20:35

## 2025-01-17 RX ADMIN — Medication 1 MILLIGRAM(S): at 20:35

## 2025-01-17 NOTE — DIETITIAN INITIAL EVALUATION ADULT - OTHER INFO
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 Loya (Talkiatry) and Alejandro Marie (DBT therapist), hx of SA iso psychosis per chart review, no current substance use, no hx of violence or arrests, PMHx of IBS, BIBA from home for sxs of rony.    Met with patient on the unit today. Patient reports his appetite has been good and denies changes to his PO intake PTA. Reports current appetite remains good with good PO intake >75% of breakfast this AM, confirmed with RN. Denies chewing/swallowing difficulties on current diet. NKFA reported. Dislikes beef, eggs, corn, milk and cheese (yogurt ok) per patient (personal food preferences, hx of IBS). Menu options explored with patient today, food preferences taken and honored on CBoard. Denies GI distress (nausea/vomiting/diarrhea/ constipation) at this time. States he tends to feel hungry at night, writer discussed healthy snacks such as fresh fruits and greek yogurt. Patient verbalized understanding.

## 2025-01-17 NOTE — BH INPATIENT PSYCHIATRY ASSESSMENT NOTE - CURRENT MEDICATION
MEDICATIONS  (STANDING):  gabapentin 800 milliGRAM(s) Oral at bedtime  haloperidol    Injectable 5 milliGRAM(s) IntraMuscular once  influenza   Vaccine 0.5 milliLiter(s) IntraMuscular once  lamoTRIgine 375 milliGRAM(s) Oral daily  LORazepam     Tablet 1 milliGRAM(s) Oral at bedtime  LORazepam   Injectable 2 milliGRAM(s) IntraMuscular once  QUEtiapine 800 milliGRAM(s) Oral at bedtime    MEDICATIONS  (PRN):  gabapentin 300 milliGRAM(s) Oral at bedtime PRN anxiety/mood isorder  haloperidol     Tablet 5 milliGRAM(s) Oral every 6 hours PRN agitation  LORazepam     Tablet 2 milliGRAM(s) Oral every 6 hours PRN agitation  melatonin. 3 milliGRAM(s) Oral at bedtime PRN Insomnia  senna 2 Tablet(s) Oral at bedtime PRN obstipation   MEDICATIONS  (STANDING):  gabapentin 800 milliGRAM(s) Oral at bedtime  haloperidol    Injectable 5 milliGRAM(s) IntraMuscular once  influenza   Vaccine 0.5 milliLiter(s) IntraMuscular once  lamoTRIgine 375 milliGRAM(s) Oral daily  lithium 600 milliGRAM(s) Oral at bedtime  LORazepam     Tablet 1 milliGRAM(s) Oral at bedtime  LORazepam   Injectable 2 milliGRAM(s) IntraMuscular once  QUEtiapine 800 milliGRAM(s) Oral at bedtime    MEDICATIONS  (PRN):  gabapentin 300 milliGRAM(s) Oral at bedtime PRN anxiety/mood isorder  haloperidol     Tablet 5 milliGRAM(s) Oral every 6 hours PRN agitation  LORazepam     Tablet 2 milliGRAM(s) Oral every 6 hours PRN agitation  melatonin. 3 milliGRAM(s) Oral at bedtime PRN Insomnia  senna 2 Tablet(s) Oral at bedtime PRN obstipation   MEDICATIONS  (STANDING):  gabapentin 800 milliGRAM(s) Oral at bedtime  haloperidol    Injectable 5 milliGRAM(s) IntraMuscular once  influenza   Vaccine 0.5 milliLiter(s) IntraMuscular once  lamoTRIgine 375 milliGRAM(s) Oral daily  lithium 600 milliGRAM(s) Oral at bedtime  LORazepam     Tablet 1 milliGRAM(s) Oral at bedtime  LORazepam   Injectable 2 milliGRAM(s) IntraMuscular once  QUEtiapine 800 milliGRAM(s) Oral at bedtime    MEDICATIONS  (PRN):  haloperidol     Tablet 5 milliGRAM(s) Oral every 6 hours PRN agitation  LORazepam     Tablet 2 milliGRAM(s) Oral every 6 hours PRN agitation  melatonin. 3 milliGRAM(s) Oral at bedtime PRN Insomnia  senna 2 Tablet(s) Oral at bedtime PRN obstipation

## 2025-01-17 NOTE — BH INPATIENT PSYCHIATRY ASSESSMENT NOTE - NSBHASSESSSUMMFT_PSY_ALL_CORE
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 Loya (Talkiatry) and Alejandro Marie (DBT therapist), hx of SA iso psychosis per chart review, no current substance use, no hx of violence or arrests, PMHx IBS BIBA from home for sxs of rony.      On initial assessment in the ED, patient was displaying signs and symptoms consistent w/an acute manic episode (i.e. impulsivity, reckless behavior, poor sleep, lability, flight of ideas), occurring iso increased psychosocial stressors and medication non-adherence. Pt has a hx of Bipolar I w/psychotic features however no signs or sxs of psychosis were elicited on interview today. Upon speaking to the patient, obtaining collateral, and reviewing the chart, patient has seemingly had numerous inpatient psychiatric hospitalizations for manic episodes despite his outpatient medication regimen. Patient would therefore benefit from Lithium for rony and mood stabilization, which he is agreeable to. CBC, Cr, TSH, EKG are wnl. Will monitor for tolerability.    Continued inpatient admission required for safety, stabilization, medication optimization, and safe discharge planning.    Plan:  1. Legal: Admitted on/continue 9.27 status  2. Safety: No reported SI/SIB/HI/VI currently on unit; continue routine observation.  - Haldol/Ativan PRN medications for safety/agitation  3. Psychiatric:  - START Lithium 600 mg qHS tonight. Uptitrate to 900 qHS on 01/18 pending tolerability. Will obtain serum level 01/21 and titrate until effect; R/B/A and side effects discussed  4. Therapy: group & milieu therapy  5. Medical: No acute medical needs identified  6. Collateral: Collateral from wife and primary psychiatrist in  Chart note 01/17 AND in ED assessment 01/16  7. 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 Loya (Talkiatry) and Alejandro Marie (DBT therapist), hx of SA iso psychosis per chart review, no current substance use, no hx of violence or arrests, PMHx IBS BIBA from home for sxs of rony.      On initial assessment in the ED, patient was displaying signs and symptoms consistent w/an acute manic episode (i.e. impulsivity, reckless behavior, poor sleep, lability, flight of ideas), occurring iso increased psychosocial stressors and medication non-adherence. Pt has a hx of Bipolar I w/psychotic features however no signs or sxs of psychosis were elicited on interview today. Upon speaking to the patient, obtaining collateral, and reviewing the chart, patient has seemingly had numerous inpatient psychiatric hospitalizations for manic episodes despite his outpatient medication regimen. Patient would therefore benefit from Lithium for rony and mood stabilization, which he is agreeable to. CBC, Cr, TSH, EKG are wnl. Will monitor for tolerability.    Continued inpatient admission required for safety, stabilization, medication optimization, and safe discharge planning.    Plan:  1. Legal: Admitted on/continue 9.27 status  2. Safety: No reported SI/SIB/HI/VI currently on unit; continue routine observation.  - Haldol/Ativan PRN medications for safety/agitation  3. Psychiatric:  - START Lithium 600 mg qHS tonight. Uptitrate to 900 qHS on 01/18 pending tolerability. Will obtain serum level 01/21 and titrate until effect; R/B/A and side effects discussed  - c/w home Lamictal 375 mg qd, Seroquel 800 qHS, gabapentin 800 qHS, Ativan 1 qHS  4. Therapy: group & milieu therapy  5. Medical: No acute medical needs identified  6. Collateral: Collateral from wife and primary psychiatrist in  Chart note 01/17 AND in ED assessment 01/16  7. 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      Patient presents with what appears to be a few weeks of escalating manic symptoms (i.e. impulsivity, reckless behavior, poor sleep, lability, flight of ideas), iso increased psychosocial stressors and brief medication non-adherence. Required PRNs for agitation in the ED. On initial evaluation by primary team, patient calm and cooperative, with fair insight. He presents with some symptoms of rony/hypomania (hyperverbal, mood lability, slight grandiosity), though suspect symptoms already quite attenuated from pre-admission. No evidence of psychosis on our evaluation. Upon speaking to the patient, obtaining collateral, and reviewing the chart, patient has seemingly had numerous inpatient psychiatric hospitalizations for manic episodes despite adherence to his outpatient medication regimen. It is not clear why patient is on Seroquel + Lamictal combination, instead of a mood stabilizer effective for bipolar I disorder, except that patient reports mild side effects to these medications decades ago. After discussion with patient and wife, with risks and benefits reviewed, patient amenable ot start lithium. Kidney function, thyroid function and EKG reviewed. Of note, pt with borderline elevated creatinine-- will monitor. Will initiate lithium starting tonight.    Continued inpatient admission required for safety, stabilization, medication optimization, and safe discharge planning.    Plan:  1. Legal: Admitted on/continue 9.27 status  2. Safety: No reported SI/SIB/HI/VI currently on unit; continue routine observation.  - Haldol/Ativan PRN medications for safety/agitation  3. Psychiatric:  - START Lithium 600 mg qHS tonight. Uptitrate to 900 qHS on 01/18 pending tolerability. Will obtain serum level 01/23 and titrate until effect; R/B/A and side effects discussed  - c/w home Lamictal 375 mg qd, Seroquel 800 qHS, gabapentin 800 qHS, Ativan 1 qHS  4. Therapy: group & milieu therapy  5. Medical: No acute medical needs identified  6. Collateral: Collateral from wife and primary psychiatrist in  Chart note 01/17 AND in ED assessment 01/16  7. 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 Loya (Talkiatry) and Alejandro aMrie (DBT therapist), hx of SA iso psychosis per chart review, no current substance use, no hx of violence or arrests, PMHx IBS, who was BIBEMS from home for sxs of rony.      Patient presents with what appears to be a few weeks of escalating manic symptoms (i.e. impulsivity, reckless behavior, poor sleep, lability, flight of ideas), iso increased psychosocial stressors and brief medication non-adherence. Required PRNs for agitation in the ED. On initial evaluation by primary team, patient calm and cooperative, with fair insight. He presents with some symptoms of rony/hypomania (hyperverbal, mood lability, slight grandiosity), though suspect symptoms already quite attenuated from pre-admission. No evidence of psychosis on our evaluation. Upon speaking to the patient, obtaining collateral, and reviewing the chart, patient has seemingly had numerous inpatient psychiatric hospitalizations for manic episodes despite adherence to his outpatient medication regimen. It is not clear why patient is on Seroquel + Lamictal combination, instead of a mood stabilizer effective for bipolar I disorder, except that patient reports mild side effects to these medications decades ago. After discussion with patient and wife, with risks and benefits reviewed, patient amenable ot start lithium. Kidney function, thyroid function and EKG reviewed. Of note, pt with slightly elevated baseline creatinine-- will monitor. Will initiate lithium starting tonight.    Continued inpatient admission required for safety, stabilization, medication optimization, and safe discharge planning.    Plan:  1. Legal: Admitted on/continue 9.27 status  2. Safety: No reported SI/SIB/HI/VI currently on unit; continue routine observation.  - Haldol/Ativan PRN medications for safety/agitation  3. Psychiatric:  - START Lithium 600 mg qHS tonight. Will keep at this dose cautiously given mildly elevated creatinine, check labs 1/21; R/B/A and side effects discussed  - c/w home Lamictal 375 mg qd, Seroquel 800 qHS, gabapentin 800 qHS, Ativan 1 qHS  4. Therapy: group & milieu therapy  5. Medical: No acute medical needs identified  6. Collateral: Collateral from wife and primary psychiatrist in  Chart note 01/17 AND in ED assessment 01/16  7. Disposition: When stable/pending clinical improvement

## 2025-01-17 NOTE — BH PATIENT PROFILE - HOME MEDICATIONS
KlonoPIN 1 mg oral tablet , 1 tab(s) orally once a day (at bedtime) MDD: 1  clonazePAM 2 mg oral tablet , 1 tab(s) orally once a day (at bedtime) MDD:2mg  valproic acid 250 mg oral capsule , 2 cap(s) orally once a day (at bedtime)  LORazepam 1 mg oral tablet , 1 tab(s) orally 3 times a day MDD:3mg  QUEtiapine 400 mg oral tablet , 1 tab(s) orally once a day (at bedtime)  QUEtiapine 200 mg oral tablet , 1 tab(s) orally once a day

## 2025-01-17 NOTE — BH INPATIENT PSYCHIATRY ASSESSMENT NOTE - DETAILS
Lithium: hand tremors  Depakote: reports that he is off of it b/c past providers "never got the level right"

## 2025-01-17 NOTE — ED ADULT NURSE REASSESSMENT NOTE - NS ED NURSE REASSESS COMMENT FT1
Patient received from previous RN. Sleeping comfortably at this time. No acute distress noted. CO maintained. Pending transfer to [facility].

## 2025-01-17 NOTE — DIETITIAN INITIAL EVALUATION ADULT - PERTINENT LABORATORY DATA
Glucose: 100 mg/dL (01.16.25 @ 15:20)   Potassium: 4.3 mmol/L (01.16.25 @ 23:30)   Magnesium: 2.3 mg/dL (01.16.25 @ 21:01)
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
## 2025-01-17 NOTE — BH CHART NOTE - NSEVENTNOTEFT_PSY_ALL_CORE
ISTOP    Patient Demographic Information (PDI)       PDI	First Name	Last Name	Birth Date	Gender	Street Address	[facility] Code  STEPH Chavez	1975	Male	[address] [facility]	[id number]    Prescription Information      PDI Filter:    PDI	Current Rx	Drug Type	Rx Written	Rx Dispensed	Drug	Quantity	Days Supply	Prescriber Name	Prescriber ANUM #	Payment Method	Dispenser  A	Y	B	12/26/2024	12/29/2024	lorazepam 1 mg tablet	30	30	Carlie Burnham	[id number]	Medicare	[id number]  A	N	B	12/11/2024	12/13/2024	lorazepam 0.5 mg tablet	15	15	Carlie Burnhma	[id number]	Medicare	[id number]  A	N	B	12/11/2024	12/13/2024	lorazepam 1 mg tablet	15	15	Carlie Burnham	[id number]	Medicare	[id number]  A	N	B	10/24/2024	11/08/2024	lorazepam 1 mg tablet	30	30	Antonio Levy DO	[id number]	Medicare	[id number]  A	N	B	10/03/2024	10/12/2024	lorazepam 0.5 mg tablet	30	30	Antonio Levy DO	[id number]	Medicare	[id number]  A	N	B	10/03/2024	10/06/2024	lorazepam 1 mg tablet	30	30	CamAntonio callahan DO	[id number]	Medicare	[id number]  A	N	B	09/12/2024	09/15/2024	lorazepam 0.5 mg tablet	30	30	[place]	[id number]	Medicare	[id number]  A	N	B	08/15/2024	09/07/2024	lorazepam 1 mg tablet	30	30	Antonio Levy DO	[id number]	Medicare	[id number]  A	N	B	08/15/2024	08/17/2024	lorazepam 0.5 mg tablet	30	30	Antonio [id number]	Medicare	[id number]  A	N	B	07/16/2024	08/05/2024	lorazepam 1 mg tablet	30	30	CamAntonio callahan DO	[id number]	Medicare	[id number]  A	N	B	06/20/2024	07/04/2024	lorazepam 1 mg tablet	30	30	Antonio Levy DO	[id number]	Medicare	[id number]  A	N	B	06/20/2024	06/20/2024	lorazepam 0.5 mg tablet	60	30	Antonio Levy DO	[id number]	Medicare	[id number]  A	N	B	05/30/2024	06/01/2024	lorazepam 1 mg tablet	60	30	Antonio Levy DO	[id number]	Medicare	[id number]  A	N	B	05/02/2024	05/02/2024	lorazepam 1 mg tablet	60	30	Antonio Levy DO	[id number]	Medicare	[id number]  A	N	B	03/14/2024	03/30/2024	lorazepam 1 mg tablet	60	30	Antonio Levy DO	[id number]	Medicare	[id number]  A	N	B	02/22/2024	02/29/2024	lorazepam 1 mg tablet	60	30	Antonio Levy DO	[id number]	Medicare	[id number]  A	N	B	01/30/2024	01/30/2024	lorazepam 1 mg tablet	60	30	Antonio Levy DO	[id number]	Medicare	[id number]

## 2025-01-17 NOTE — CHART NOTE - NSCHARTNOTEFT_GEN_A_CORE
Screening Medical Evaluation    Patient Admitted from:  ED    [facility] admitting diagnosis: Severe manic episode with psychotic symptoms      PAST MEDICAL & SURGICAL HISTORY:  Bipolar 1 disorder  IBS (irritable bowel syndrome)  H/O Achilles tendon repair bilateral    ALLERGIES:  penicillins (Unknown)    SOCIAL HISTORY:  Patient denies use of tobacco/nicotine, alcohol, or other substances    FAMILY HISTORY:  No known pertinent family history in 1st degree relatives      MEDICATIONS  (STANDING):  gabapentin 800 milliGRAM(s) Oral at bedtime  haloperidol    Injectable 5 milliGRAM(s) IntraMuscular once  influenza   Vaccine 0.5 milliLiter(s) IntraMuscular once  lamoTRIgine 375 milliGRAM(s) Oral daily  lithium 600 milliGRAM(s) Oral at bedtime  LORazepam     Tablet 1 milliGRAM(s) Oral at bedtime  LORazepam   Injectable 2 milliGRAM(s) IntraMuscular once  QUEtiapine 800 milliGRAM(s) Oral at bedtime    MEDICATIONS  (PRN):  haloperidol     Tablet 5 milliGRAM(s) Oral every 6 hours PRN agitation  LORazepam     Tablet 2 milliGRAM(s) Oral every 6 hours PRN agitation  melatonin. 3 milliGRAM(s) Oral at bedtime PRN Insomnia  senna 2 Tablet(s) Oral at bedtime PRN obstipation      Vital Signs Last 24 Hrs  T(C): 37.1 (2025 04:33), Max: 37.1 (2025 04:33)  T(F): 98.8 (2025 04:33), Max: 98.8 (2025 04:33)  HR: 80 (2025 03:30) (80 - 89)  BP: 123/75 (2025 03:30) (123/75 - 137/81)  BP(mean): --  RR: 16 (2025 03:30) (16 - 20)  SpO2: 98% (2025 03:30) (98% - 100%)      PHYSICAL EXAM:  GENERAL: NAD  HEAD: Atraumatic, Normocephalic  EYES: EOMI, PERRLA, conjunctiva and sclera clear  NECK: Supple, No JVD  CHEST/LUNG: Clear to auscultation bilaterally; No wheeze  HEART: Regular rate and rhythm; No murmurs, rubs, or gallops  ABDOMEN: Soft, Nontender, Nondistended; Bowel sounds present  EXTREMITIES: 2+ Peripheral Pulses, No clubbing, cyanosis, or edema  NEUROLOGY: non-focal  SKIN: No rashes or lesions      LABS:                        13.5   3.25  )-----------( 287      ( 2025 15:20 )             37.6         x   |  x   |  x   ----------------------------<  x   4.3   |  x   |  x     Ca    9.3      2025 15:20  Mg     2.3           Urinalysis Basic - ( 2025 16:21 )  Color: Yellow / Appearance: Clear / S.008 / pH: x  Gluc: x / Ketone: Negative mg/dL  / Bili: Negative / Urobili: 1.0 mg/dL   Blood: x / Protein: Negative mg/dL / Nitrite: Negative   Leuk Esterase: Negative / RBC: x / WBC x   Sq Epi: x / Non Sq Epi: x / Bacteria: x      Assessment and Plan:  49M admitted to [facility] for Severe manic episode with psychotic symptoms w/ PMHx IBS seen at bedside for medical screening evaluation. Patient has no acute medical complaints at this time. Patient denies fever, chills, headache, dizziness, lightheadedness, N/V, SOB, cough, congestion, chest pain, abdominal pain, dysuria, hematuria, diarrhea, constipation. Physical exam unremarkable, VSS. Labs unremarkable (K 3.2 initially but repleted and improved to 4.3). EKG NSR, unchanged from previous in .    1.) Severe manic episode with psychotic symptoms: Refer to primary team documentation for recs

## 2025-01-17 NOTE — BH CHART NOTE - NSEVENTNOTEFT_PSY_ALL_CORE
Collateral    Spoke to Niecy Chavez (wife) [phone]:  Corroborates collateral obtained in ED (see ED BH assessment note 01/16). Believes that manic episode was precipitated more so by stressors than medication non-adherence. Spoke at length about risks and benefits of Depakote vs. Lithium, wife is favoring Lithium at this time and will speak to patient regarding next steps.     Spoke to Dr. Carlie Villegas (primary psych): [phone]:  She is patient's interim primary psychiatrist via [facility] while patient's primary psychiatrist (Dr. Lawrence Levy) is on leave. Spoke to him yesterday (virtually) because patient was requesting a refill of Ativan for anxiety. At time of encounter, notes that patient was "manic, pressured, labile, had FOI" and describes him as a "safety concern." She is strongly advocating for Lithium. Also states that he has anxiety that leaves him "housebound" and requires higher level of care. Patient cannot return to [facility] because they do not accept patients back after inpatient psych hospitalization.

## 2025-01-17 NOTE — DIETITIAN INITIAL EVALUATION ADULT - EDUCATION DIETARY MODIFICATIONS
"Informed patient of surgical pathology as below consistent with stage IIA squamous cell lung carcinoma. She was not surprised with path given her imaging indicated likely lymph node involvement. Aware we are referring her to medical oncology, Dr. Shae Valles, to discuss next steps. She is recovering well. Reports feeling initially winded but that has improved. Walking every ""2 hours\"" and using her IS as instructed. Encouraged her to call with any concerns. Will see her for her post-op visit 5/11. Pathologic Diagnosis : Â    A: Lung, right middle lobe, lobectomy: Â    - Poorly differentiated squamous cell carcinoma (3.1 cm in greatest Â    dimension), margins negative Â    - One of two peribronchial lymph nodes positive for metastatic squamous cell Â    carcinoma (1/2) Â    - Remaining lung parenchyma with emphysematous changes Â    - pT2a N1 Â    Â    B: Lymph node, station 9, excision: Â    - Three lymph node fragments negative for metastatic carcinoma Â    Â    C: Lymph node, station 7, excision: Â    - Five lymph node fragments negative for metastatic carcinoma Â    Â    D: Lymph node, station 2, excision: Â    - Five lymph node fragments negative for metastatic carcinoma Â    Â    E: Lymph node, station 4, excision: Â    - Four lymph nodes negative for metastatic carcinoma (0/4) Â    Â    Diagnosis Comment: Â    Lung markers ([id number], 3/15/2017) Â    FISH ROS1: Negative Â    FISH ALK: Negative Â    EGFR mutation: Not detected Â    PD-L1 (Mary Anne Gonzalez):  High expression, 70%, strong Â    Â    "
(1) partially meets; needs review/practice/verbalization
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
## 2025-01-17 NOTE — BH INPATIENT PSYCHIATRY ASSESSMENT NOTE - NSBHCHARTREVIEWVS_PSY_A_CORE FT
Vital Signs Last 24 Hrs  T(C): 37.1 (01-17-25 @ 04:33), Max: 37.3 (01-16-25 @ 18:00)  T(F): 98.8 (01-17-25 @ 04:33), Max: 99.1 (01-16-25 @ 18:00)  HR: 80 (01-17-25 @ 03:30) (72 - 117)  BP: 123/75 (01-17-25 @ 03:30) (116/68 - 146/82)  BP(mean): --  RR: 16 (01-17-25 @ 03:30) (16 - 22)  SpO2: 98% (01-17-25 @ 03:30) (94% - 100%)    Orthostatic VS  01-17-25 @ 04:33  Lying BP: --/-- HR: --  Sitting BP: 120/71 HR: 108  Standing BP: 132/85 HR: 92  Site: upper left arm  Mode: electronic   Vital Signs Last 24 Hrs  T(C): 37.1 (01-17-25 @ 04:33), Max: 37.3 (01-16-25 @ 18:00)  T(F): 98.8 (01-17-25 @ 04:33), Max: 99.1 (01-16-25 @ 18:00)  HR: 80 (01-17-25 @ 03:30) (72 - 92)  BP: 123/75 (01-17-25 @ 03:30) (116/68 - 137/81)  BP(mean): --  RR: 16 (01-17-25 @ 03:30) (16 - 20)  SpO2: 98% (01-17-25 @ 03:30) (94% - 100%)    Orthostatic VS  01-17-25 @ 04:33  Lying BP: --/-- HR: --  Sitting BP: 120/71 HR: 108  Standing BP: 132/85 HR: 92  Site: upper left arm  Mode: electronic

## 2025-01-17 NOTE — BH PATIENT PROFILE - NSPROPTRIGHTSUPPORTPERSON_GEN_A_NUR
Recommendation Preamble: The following recommendations were made during the visit: Recommendations (Free Text): Eryfotona Actinica QD\\nHeliocare\\nVitamin B3 500mg BID Detail Level: Zone declines

## 2025-01-17 NOTE — BH INPATIENT PSYCHIATRY ASSESSMENT NOTE - NSBHATTESTCOMMENTATTENDFT_PSY_A_CORE
Patient seen and evaluated with Dr. Machado. Presetnation prior to admission per collateral appears consistent with rony/hypomania, now improved though still symptomatic. Regimen is odd, with high dose Seroquel and Lamictal, and no traditional mood stabilizer for manic prevention. Discussed with pt, wife and psychiatrist, all amenable to adding lithium. Will start this weekend. Patient seen and evaluated with Dr. Machado. Presentation prior to admission per collateral appears consistent with rony/hypomania, now improved though still symptomatic. Regimen is odd, with high dose Seroquel and Lamictal, and no traditional mood stabilizer for manic prevention. Discussed with pt, wife and psychiatrist, all amenable to adding lithium. Will start this weekend, though keep at 600mg for now given mildly elevated Cr, check labs Tuesday.

## 2025-01-17 NOTE — BH INPATIENT PSYCHIATRY ASSESSMENT NOTE - NSBHMETABOLIC_PSY_ALL_CORE_FT
BMI: BMI (kg/m2): 25.8 (01-17-25 @ 04:33)  HbA1c:   Glucose:   BP: --Vital Signs Last 24 Hrs  T(C): 37.1 (01-17-25 @ 04:33), Max: 37.3 (01-16-25 @ 18:00)  T(F): 98.8 (01-17-25 @ 04:33), Max: 99.1 (01-16-25 @ 18:00)  HR: 80 (01-17-25 @ 03:30) (72 - 117)  BP: 123/75 (01-17-25 @ 03:30) (116/68 - 146/82)  BP(mean): --  RR: 16 (01-17-25 @ 03:30) (16 - 22)  SpO2: 98% (01-17-25 @ 03:30) (94% - 100%)    Orthostatic VS  01-17-25 @ 04:33  Lying BP: --/-- HR: --  Sitting BP: 120/71 HR: 108  Standing BP: 132/85 HR: 92  Site: upper left arm  Mode: electronic    Lipid Panel:  BMI: BMI (kg/m2): 25.8 (01-17-25 @ 04:33)  HbA1c:   Glucose:   BP: --Vital Signs Last 24 Hrs  T(C): 37.1 (01-17-25 @ 04:33), Max: 37.3 (01-16-25 @ 18:00)  T(F): 98.8 (01-17-25 @ 04:33), Max: 99.1 (01-16-25 @ 18:00)  HR: 80 (01-17-25 @ 03:30) (72 - 92)  BP: 123/75 (01-17-25 @ 03:30) (116/68 - 137/81)  BP(mean): --  RR: 16 (01-17-25 @ 03:30) (16 - 20)  SpO2: 98% (01-17-25 @ 03:30) (94% - 100%)    Orthostatic VS  01-17-25 @ 04:33  Lying BP: --/-- HR: --  Sitting BP: 120/71 HR: 108  Standing BP: 132/85 HR: 92  Site: upper left arm  Mode: electronic    Lipid Panel:

## 2025-01-17 NOTE — BH INPATIENT PSYCHIATRY ASSESSMENT NOTE - NSBHCHARTREVIEWLAB_PSY_A_CORE FT
13.5   3.25  )-----------( 287      ( 16 Jan 2025 15:20 )             37.6     Creatinine 1.29  TSH 0.464

## 2025-01-17 NOTE — PSYCHIATRIC REHAB INITIAL EVALUATION - NSBHPRRECOMMEND_PSY_ALL_CORE
Patient is a 49 year old  male, with a hx of Bipolar Disorder with psychosis, with a hx of one suicide attempt, referred by wife to the hospital due to worsening rony. Patient began behaving in an odd manner during the holidays, rambling, and taking off his clothing. Medical records indicate that the patient has not taken his medication for several days. Patient was agitated in the ED, requiring PRN medication. Patient is calm on unit, though a poor historian, oddly related with poor ADLs.   Patient and writer were unable to establish a collaborative rehabilitation goal, therefore an appropriate goal will be selected for the patient. Psych rehab staff will continue to provide ongoing support and encouragement.
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
## 2025-01-17 NOTE — BH INPATIENT PSYCHIATRY ASSESSMENT NOTE - NSBHCHARTREVIEWINVESTIGATE_PSY_A_CORE FT
< from: 12 Lead ECG (01.16.25 @ 15:19) >      Ventricular Rate 100 BPM    Atrial Rate 100 BPM    P-R Interval 182 ms    QRS Duration 116 ms    Q-T Interval 368 ms    QTC Calculation(Bazett) 474 ms    P Axis 59 degrees    R Axis -56 degrees    T Axis 56 degrees    Diagnosis Line Normal sinus rhythm  Incomplete right bundle branch block  Left anterior fascicular block  Nonspecific ST abnormality  Abnormal ECG  No previous ECGs available  Confirmed by LIDIA PERALTA ([id number]) on 1/17/2025 9:08:18 AM    < end of copied text >

## 2025-01-17 NOTE — BH INPATIENT PSYCHIATRY ASSESSMENT NOTE - PAST PSYCHOTROPIC MEDICATION
Currently on Lamictal, Seroquel, Gabapentin, Ativan  Reports that he was previously on Lithium, Depakote, Klonopin  per chart review: Wellbutrin

## 2025-01-17 NOTE — DIETITIAN INITIAL EVALUATION ADULT - ADD RECOMMEND
- Encourage healthy food/snack choices.  - Monitor PO intake/tolerance, weights, labs, BM's, and skin integrity.

## 2025-01-17 NOTE — DIETITIAN INITIAL EVALUATION ADULT - PERTINENT MEDS FT
MEDICATIONS  (STANDING):  gabapentin 800 milliGRAM(s) Oral at bedtime  haloperidol    Injectable 5 milliGRAM(s) IntraMuscular once  influenza   Vaccine 0.5 milliLiter(s) IntraMuscular once  lamoTRIgine 375 milliGRAM(s) Oral daily  lithium 600 milliGRAM(s) Oral at bedtime  LORazepam     Tablet 1 milliGRAM(s) Oral at bedtime  LORazepam   Injectable 2 milliGRAM(s) IntraMuscular once  QUEtiapine 800 milliGRAM(s) Oral at bedtime    MEDICATIONS  (PRN):  gabapentin 300 milliGRAM(s) Oral at bedtime PRN anxiety/mood isorder  haloperidol     Tablet 5 milliGRAM(s) Oral every 6 hours PRN agitation  LORazepam     Tablet 2 milliGRAM(s) Oral every 6 hours PRN agitation  melatonin. 3 milliGRAM(s) Oral at bedtime PRN Insomnia  senna 2 Tablet(s) Oral at bedtime PRN obstipation

## 2025-01-18 PROCEDURE — 99232 SBSQ HOSP IP/OBS MODERATE 35: CPT

## 2025-01-18 RX ORDER — POLYETHYLENE GLYCOL 3350 17 G/17G
17 POWDER, FOR SOLUTION ORAL DAILY
Refills: 0 | Status: DISCONTINUED | OUTPATIENT
Start: 2025-01-18 | End: 2025-01-24

## 2025-01-18 RX ADMIN — QUETIAPINE FUMARATE 800 MILLIGRAM(S): 300 TABLET ORAL at 20:59

## 2025-01-18 RX ADMIN — GABAPENTIN 800 MILLIGRAM(S): 800 TABLET ORAL at 20:59

## 2025-01-18 RX ADMIN — POLYETHYLENE GLYCOL 3350 17 GRAM(S): 17 POWDER, FOR SOLUTION ORAL at 11:12

## 2025-01-18 RX ADMIN — Medication 600 MILLIGRAM(S): at 20:59

## 2025-01-18 RX ADMIN — Medication 2 TABLET(S): at 20:58

## 2025-01-18 RX ADMIN — Medication 1 MILLIGRAM(S): at 20:59

## 2025-01-18 RX ADMIN — LAMOTRIGINE 375 MILLIGRAM(S): 100 TABLET ORAL at 08:34

## 2025-01-18 NOTE — BH INPATIENT PSYCHIATRY PROGRESS NOTE - NSBHASSESSSUMMFT_PSY_ALL_CORE
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      Patient presents with what appears to be a few weeks of escalating manic symptoms (i.e. impulsivity, reckless behavior, poor sleep, lability, flight of ideas), iso increased psychosocial stressors and brief medication non-adherence. Required PRNs for agitation in the ED. On initial evaluation by primary team, patient calm and cooperative, with fair insight. He presents with some symptoms of rony/hypomania (hyperverbal, mood lability, slight grandiosity), though suspect symptoms already quite attenuated from pre-admission. No evidence of psychosis on our evaluation. Upon speaking to the patient, obtaining collateral, and reviewing the chart, patient has seemingly had numerous inpatient psychiatric hospitalizations for manic episodes despite adherence to his outpatient medication regimen. It is not clear why patient is on Seroquel + Lamictal combination, instead of a mood stabilizer effective for bipolar I disorder, except that patient reports mild side effects to these medications decades ago. After discussion with patient and wife, with risks and benefits reviewed, patient amenable ot start lithium. Kidney function, thyroid function and EKG reviewed. Of note, pt with slightly elevated baseline creatinine-- will monitor. Will initiate lithium starting tonight.    Continued inpatient admission required for safety, stabilization, medication optimization, and safe discharge planning.    Plan:  1. Legal: Admitted on/continue 9.27 status  2. Safety: No reported SI/SIB/HI/VI currently on unit; continue routine observation.  - Haldol/Ativan PRN medications for safety/agitation  3. Psychiatric:  - START Lithium 600 mg qHS tonight. Will keep at this dose cautiously given mildly elevated creatinine, check labs 1/21; R/B/A and side effects discussed  - c/w home Lamictal 375 mg qd, Seroquel 800 qHS, gabapentin 800 qHS, Ativan 1 qHS  4. Therapy: group & milieu therapy  5. Medical: No acute medical needs identified  6. Collateral: Collateral from wife and primary psychiatrist in  Chart note 01/17 AND in ED assessment 01/16  7. Disposition: When stable/pending clinical improvement
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
## 2025-01-18 NOTE — BH INPATIENT PSYCHIATRY PROGRESS NOTE - NSBHFUPINTERVALHXFT_PSY_A_CORE
f/up Bipolar d/o, rony, VSS. Patient reported sleeping better last night, woke up once, with good appetite. denied feeling euphoric, racing  thoughts are decreased, does feel distracted. Reported that his "OCD is kicking in" and is trying to keep busy to "compensate". Observed sitting at his table writing things down. Patient denied N, V or loose stools. denied weakness.  no tremors observed/ reported.

## 2025-01-19 PROCEDURE — 99232 SBSQ HOSP IP/OBS MODERATE 35: CPT

## 2025-01-19 RX ADMIN — POLYETHYLENE GLYCOL 3350 17 GRAM(S): 17 POWDER, FOR SOLUTION ORAL at 08:30

## 2025-01-19 RX ADMIN — GABAPENTIN 800 MILLIGRAM(S): 800 TABLET ORAL at 20:12

## 2025-01-19 RX ADMIN — QUETIAPINE FUMARATE 800 MILLIGRAM(S): 300 TABLET ORAL at 20:11

## 2025-01-19 RX ADMIN — Medication 600 MILLIGRAM(S): at 20:11

## 2025-01-19 RX ADMIN — Medication 1 MILLIGRAM(S): at 20:11

## 2025-01-19 RX ADMIN — Medication 2 TABLET(S): at 20:11

## 2025-01-19 RX ADMIN — LAMOTRIGINE 375 MILLIGRAM(S): 100 TABLET ORAL at 08:26

## 2025-01-19 NOTE — BH INPATIENT PSYCHIATRY PROGRESS NOTE - NSBHFUPINTERVALHXFT_PSY_A_CORE
f/up Bipolar d/o, rony, VSS. Patient reported sleeping better, reported that combination of lithium and lamictal is helping him and mood feels leveled. reported decrease in racing thoughts, with improvement in concentration, as he is able to write down a daily routine/ schedule.  Patient denied N, V or loose stools. denied weakness.  no tremors observed/ reported.

## 2025-01-19 NOTE — BH INPATIENT PSYCHIATRY PROGRESS NOTE - NSBHASSESSSUMMFT_PSY_ALL_CORE
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      Patient presents with what appears to be a few weeks of escalating manic symptoms (i.e. impulsivity, reckless behavior, poor sleep, lability, flight of ideas), iso increased psychosocial stressors and brief medication non-adherence. Required PRNs for agitation in the ED. On initial evaluation by primary team, patient calm and cooperative, with fair insight. He presents with some symptoms of rony/hypomania (hyperverbal, mood lability, slight grandiosity), though suspect symptoms already quite attenuated from pre-admission. No evidence of psychosis on our evaluation. Upon speaking to the patient, obtaining collateral, and reviewing the chart, patient has seemingly had numerous inpatient psychiatric hospitalizations for manic episodes despite adherence to his outpatient medication regimen. It is not clear why patient is on Seroquel + Lamictal combination, instead of a mood stabilizer effective for bipolar I disorder, except that patient reports mild side effects to these medications decades ago. After discussion with patient and wife, with risks and benefits reviewed, patient amenable ot start lithium. Kidney function, thyroid function and EKG reviewed. Of note, pt with slightly elevated baseline creatinine-- will monitor. Will initiate lithium starting tonight.    Continued inpatient admission required for safety, stabilization, medication optimization, and safe discharge planning.    Plan:  1. Legal: Admitted on/continue 9.27 status  2. Safety: No reported SI/SIB/HI/VI currently on unit; continue routine observation.  - Haldol/Ativan PRN medications for safety/agitation  3. Psychiatric:  - START Lithium 600 mg qHS tonight. Will keep at this dose cautiously given mildly elevated creatinine, check labs 1/21; R/B/A and side effects discussed  - c/w home Lamictal 375 mg qd, Seroquel 800 qHS, gabapentin 800 qHS, Ativan 1 qHS  4. Therapy: group & milieu therapy  5. Medical: No acute medical needs identified  6. Collateral: Collateral from wife and primary psychiatrist in  Chart note 01/17 AND in ED assessment 01/16  7. Disposition: When stable/pending clinical improvement
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
## 2025-01-20 PROCEDURE — 99232 SBSQ HOSP IP/OBS MODERATE 35: CPT

## 2025-01-20 RX ORDER — PHENOL 1.4 %
1 AEROSOL, SPRAY (ML) MUCOUS MEMBRANE EVERY 8 HOURS
Refills: 0 | Status: DISCONTINUED | OUTPATIENT
Start: 2025-01-20 | End: 2025-01-24

## 2025-01-20 RX ADMIN — Medication 1 MILLIGRAM(S): at 21:03

## 2025-01-20 RX ADMIN — Medication 600 MILLIGRAM(S): at 21:02

## 2025-01-20 RX ADMIN — GABAPENTIN 800 MILLIGRAM(S): 800 TABLET ORAL at 21:02

## 2025-01-20 RX ADMIN — LAMOTRIGINE 375 MILLIGRAM(S): 100 TABLET ORAL at 08:35

## 2025-01-20 RX ADMIN — Medication 1 LOZENGE: at 21:02

## 2025-01-20 RX ADMIN — Medication 2 MILLIGRAM(S): at 23:38

## 2025-01-20 RX ADMIN — QUETIAPINE FUMARATE 800 MILLIGRAM(S): 300 TABLET ORAL at 21:02

## 2025-01-20 RX ADMIN — Medication 1 LOZENGE: at 11:44
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
## 2025-01-20 NOTE — BH INPATIENT PSYCHIATRY PROGRESS NOTE - NSBHFUPINTERVALHXFT_PSY_A_CORE
f/up Bipolar d/o, rony, vitals reviewed, patient observed playing cards with other peers in the dayroom.  Patient reported sleeping better, reported decrease in racing thoughts, with improvement in focus  and concentration, as he is able to write down a daily routine/ schedule.  Patient denied N, V or loose stools. denied weakness.  no tremors observed/ reported. spoke of possibly decreasing Seroquel due to dry mouth. patient also shared volunteer activities he does on the outside.

## 2025-01-21 LAB
A1C WITH ESTIMATED AVERAGE GLUCOSE RESULT: 4.7 % — SIGNIFICANT CHANGE UP (ref 4–5.6)
ANION GAP SERPL CALC-SCNC: 12 MMOL/L — SIGNIFICANT CHANGE UP (ref 7–14)
BUN SERPL-MCNC: 10 MG/DL — SIGNIFICANT CHANGE UP (ref 7–23)
CALCIUM SERPL-MCNC: 9.7 MG/DL — SIGNIFICANT CHANGE UP (ref 8.4–10.5)
CHLORIDE SERPL-SCNC: 101 MMOL/L — SIGNIFICANT CHANGE UP (ref 98–107)
CHOLEST SERPL-MCNC: 180 MG/DL — SIGNIFICANT CHANGE UP
CO2 SERPL-SCNC: 27 MMOL/L — SIGNIFICANT CHANGE UP (ref 22–31)
CREAT SERPL-MCNC: 1.22 MG/DL — SIGNIFICANT CHANGE UP (ref 0.5–1.3)
EGFR: 72 ML/MIN/1.73M2 — SIGNIFICANT CHANGE UP
ESTIMATED AVERAGE GLUCOSE: 88 — SIGNIFICANT CHANGE UP
GLUCOSE SERPL-MCNC: 68 MG/DL — LOW (ref 70–99)
HCT VFR BLD CALC: 44.2 % — SIGNIFICANT CHANGE UP (ref 39–50)
HDLC SERPL-MCNC: 51 MG/DL — SIGNIFICANT CHANGE UP
HGB BLD-MCNC: 14.7 G/DL — SIGNIFICANT CHANGE UP (ref 13–17)
LIPID PNL WITH DIRECT LDL SERPL: 117 MG/DL — HIGH
LITHIUM SERPL-MCNC: 0.4 MMOL/L — LOW (ref 0.6–1.2)
MCHC RBC-ENTMCNC: 28.5 PG — SIGNIFICANT CHANGE UP (ref 27–34)
MCHC RBC-ENTMCNC: 33.3 G/DL — SIGNIFICANT CHANGE UP (ref 32–36)
MCV RBC AUTO: 85.7 FL — SIGNIFICANT CHANGE UP (ref 80–100)
NON HDL CHOLESTEROL: 129 MG/DL — SIGNIFICANT CHANGE UP
NRBC # BLD AUTO: 0 K/UL — SIGNIFICANT CHANGE UP (ref 0–0)
NRBC # BLD: 0 /100 WBCS — SIGNIFICANT CHANGE UP (ref 0–0)
NRBC # FLD: 0 K/UL — SIGNIFICANT CHANGE UP (ref 0–0)
NRBC BLD-RTO: 0 /100 WBCS — SIGNIFICANT CHANGE UP (ref 0–0)
PLATELET # BLD AUTO: 252 K/UL — SIGNIFICANT CHANGE UP (ref 150–400)
POTASSIUM SERPL-MCNC: 4 MMOL/L — SIGNIFICANT CHANGE UP (ref 3.5–5.3)
POTASSIUM SERPL-SCNC: 4 MMOL/L — SIGNIFICANT CHANGE UP (ref 3.5–5.3)
RBC # BLD: 5.16 M/UL — SIGNIFICANT CHANGE UP (ref 4.2–5.8)
RBC # FLD: 12.3 % — SIGNIFICANT CHANGE UP (ref 10.3–14.5)
SODIUM SERPL-SCNC: 140 MMOL/L — SIGNIFICANT CHANGE UP (ref 135–145)
TRIGL SERPL-MCNC: 60 MG/DL — SIGNIFICANT CHANGE UP
WBC # BLD: 3.97 K/UL — SIGNIFICANT CHANGE UP (ref 3.8–10.5)
WBC # FLD AUTO: 3.97 K/UL — SIGNIFICANT CHANGE UP (ref 3.8–10.5)

## 2025-01-21 PROCEDURE — 99232 SBSQ HOSP IP/OBS MODERATE 35: CPT | Mod: GC

## 2025-01-21 RX ORDER — LITHIUM CARBONATE 300 MG
900 CAPSULE ORAL AT BEDTIME
Refills: 0 | Status: DISCONTINUED | OUTPATIENT
Start: 2025-01-21 | End: 2025-01-24

## 2025-01-21 RX ADMIN — GABAPENTIN 800 MILLIGRAM(S): 800 TABLET ORAL at 20:56

## 2025-01-21 RX ADMIN — Medication 900 MILLIGRAM(S): at 20:54

## 2025-01-21 RX ADMIN — QUETIAPINE FUMARATE 800 MILLIGRAM(S): 300 TABLET ORAL at 20:55

## 2025-01-21 RX ADMIN — Medication 1 MILLIGRAM(S): at 20:54

## 2025-01-21 RX ADMIN — LAMOTRIGINE 375 MILLIGRAM(S): 100 TABLET ORAL at 08:12

## 2025-01-21 NOTE — BH INPATIENT PSYCHIATRY PROGRESS NOTE - NSBHASSESSSUMMFT_PSY_ALL_CORE
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      See admission note 01/17 for extensive assessment on day of admission.     Patient reports subjective improvements in sleep, mood, concentration, and thought process however still exhibits features of rony i.e. grandiosity, irritability, and fast rate of speech noted by his wife. Labs from this AM reveal lithium 0.4. Will plan to uptitrate lithium to 900 mg qHS w/change in formulation to Eskalith CR to reduce GI upset. Symptomatic tx for xerostomia. Wife and patient in agreement.     Plan:  1. Legal: Admitted on/continue 9.27 status  2. Safety: No reported SI/SIB/HI/VI currently on unit; continue routine observation.  - Haldol/Ativan PRN medications for safety/agitation  3. Psychiatric:  - c/w Lithium Eskalith  mg qHS. Will keep at this dose cautiously given mildly elevated creatinine; R/B/A and side effects discussed  - c/w home Lamictal 375 mg qd, Seroquel 800 qHS, gabapentin 800 qHS, Ativan 1 qHS  4. Therapy: group & milieu therapy  5. Medical: No acute medical needs identified  6. Collateral: Collateral from wife and primary psychiatrist in  Chart note 01/17 AND in ED assessment 01/16  7. 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      See admission note 01/17 for extensive assessment on day of admission.     Patient reports subjective improvements in sleep, mood, concentration, and thought process however still exhibits residual features of rony i.e. grandiosity, irritability, lability and fast rate of speech noted by his wife. Labs from this AM reveal lithium 0.4. Will plan to uptitrate lithium to 900 mg qHS w/change in formulation to Eskalith CR to reduce GI upset. Symptomatic tx for xerostomia. Wife and patient in agreement.     Plan:  1. Legal: Admitted on/continue 9.27 status  2. Safety: No reported SI/SIB/HI/VI currently on unit; continue routine observation.  - Haldol/Ativan PRN medications for safety/agitation  3. Psychiatric:  - c/w Lithium Eskalith  mg qHS. Will keep at this dose cautiously given mildly elevated creatinine; R/B/A and side effects discussed  - c/w home Lamictal 375 mg qd, Seroquel 800 qHS, gabapentin 800 qHS, Ativan 1 qHS  4. Therapy: group & milieu therapy  5. Medical: No acute medical needs identified  6. Collateral: Collateral from wife and primary psychiatrist in  Chart note 01/17 AND in ED assessment 01/16  7. Disposition: When stable/pending clinical improvement, plan to refer to [facility] bipolar clinic

## 2025-01-21 NOTE — BH INPATIENT PSYCHIATRY PROGRESS NOTE - NSBHCHARTREVIEWLAB_PSY_A_CORE FT
14.7   3.97  )-----------( 252      ( 21 Jan 2025 09:18 )             44.2   01-21    140  |  101  |  10  ----------------------------<  68[L]  4.0   |  27  |  1.22    Ca    9.7      21 Jan 2025 09:18    01-21 Chol 180 LDL -- HDL 51 Trig 60    Lithium 0.4  A1C 4.7%
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
## 2025-01-22 PROCEDURE — 99232 SBSQ HOSP IP/OBS MODERATE 35: CPT | Mod: GC

## 2025-01-22 RX ADMIN — Medication 900 MILLIGRAM(S): at 20:33

## 2025-01-22 RX ADMIN — LAMOTRIGINE 375 MILLIGRAM(S): 100 TABLET ORAL at 08:33

## 2025-01-22 RX ADMIN — Medication 2 MILLIGRAM(S): at 00:05

## 2025-01-22 RX ADMIN — GABAPENTIN 800 MILLIGRAM(S): 800 TABLET ORAL at 20:33

## 2025-01-22 RX ADMIN — Medication 1 MILLIGRAM(S): at 20:33

## 2025-01-22 RX ADMIN — QUETIAPINE FUMARATE 800 MILLIGRAM(S): 300 TABLET ORAL at 20:32

## 2025-01-22 RX ADMIN — Medication 1 LOZENGE: at 00:04

## 2025-01-22 NOTE — BH INPATIENT PSYCHIATRY PROGRESS NOTE - NSBHFUPINTERVALHXFT_PSY_A_CORE
Chart reviewed. Case discussed with interdisciplinary team. Patient seen and examined. No acute events overnight. Adherent to standing medications. Provided w/Ativan 2 mg PRN last night for anxiety. VSS.     Today, patient feels much better compared to prior days. Reports no additional nausea or vomiting, believes that episode over the weekend was due to nerves rather than lithium. Still experiencing dry mouth however it has improved w/oral hydration. Mood is more level, and he is no longer experiencing racing thoughts. Has been sleeping well at night, corroborated by sleep log. When pt first came here, he was experiencing so many racing thoughts that he tore "32 pages out of my notebook" after scribbling down thoughts. Pt reports that he is no longer doing that. Looking forward to discharge, being connected w/bipolar clinic, and finishing his move to his new residence. No SIIP or HIIP. No AVH.
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
## 2025-01-22 NOTE — BH INPATIENT PSYCHIATRY PROGRESS NOTE - NSBHASSESSSUMMFT_PSY_ALL_CORE
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      See admission note 01/17 for extensive assessment on day of admission.     Today, patient exhibits marked improvements in mood & observed affect -- he is notably calmer, euthymic, no longer irritable. Speech rate has slowed down and no grandiosity was elicited today during interview. Patient also w/improving tolerability to Lithium. Will c/w Lithium Eskalith  mg qHS for now and obtain a level in 5-7 days.     Plan:  1. Legal: Admitted on/continue 9.27 status  2. Safety: No reported SI/SIB/HI/VI currently on unit; continue routine observation.  - Haldol/Ativan PRN medications for safety/agitation  3. Psychiatric:  - c/w Lithium Eskalith  mg qHS. Will keep at this dose cautiously given mildly elevated creatinine; R/B/A and side effects discussed  - c/w home Lamictal 375 mg qd, Seroquel 800 qHS, gabapentin 800 qHS, Ativan 1 qHS  4. Therapy: group & milieu therapy  5. Medical: No acute medical needs identified  6. Collateral: Collateral from wife and primary psychiatrist in  Chart note 01/17 AND in ED assessment 01/16  7. 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      See admission note 01/17 for extensive assessment on day of admission.     Today, patient exhibits marked improvements in mood & observed affect -- he is notably calmer, euthymic, no longer irritable. Speech rate has slowed down and no grandiosity was elicited today during interview. Patient also w/improving tolerability to Lithium. Will c/w Lithium Eskalith  mg qHS for now and obtain a level prior to discharge. Referral placed for [facility] bipolar clinic, pending.    Plan:  1. Legal: Admitted on/continue 9.27 status  2. Safety: No reported SI/SIB/HI/VI currently on unit; continue routine observation.  - Haldol/Ativan PRN medications for safety/agitation  3. Psychiatric:  - c/w Lithium Eskalith  mg qHS. Will obtain level prior to d/c; R/B/A and side effects discussed  	-Level of 0.4 at 600mg  - c/w home Lamictal 375 mg qd, Seroquel 800 qHS, gabapentin 800 qHS, Ativan 1 qHS  4. Therapy: group & milieu therapy  5. Medical: No acute medical needs identified  6. Collateral: Collateral from wife and primary psychiatrist in  Chart note 01/17 AND in ED assessment 01/16  7. Disposition: When stable/pending clinical improvement, referred to [facility] bipolar clinic

## 2025-01-23 PROCEDURE — 99232 SBSQ HOSP IP/OBS MODERATE 35: CPT | Mod: GC

## 2025-01-23 RX ORDER — LAMOTRIGINE 100 MG/1
2.5 TABLET ORAL
Qty: 75 | Refills: 0
Start: 2025-01-23 | End: 2025-02-21

## 2025-01-23 RX ORDER — LITHIUM CARBONATE 300 MG
2 CAPSULE ORAL
Qty: 60 | Refills: 0
Start: 2025-01-23 | End: 2025-02-21

## 2025-01-23 RX ORDER — GABAPENTIN 800 MG/1
600 TABLET ORAL AT BEDTIME
Refills: 0 | Status: DISCONTINUED | OUTPATIENT
Start: 2025-01-23 | End: 2025-01-24

## 2025-01-23 RX ORDER — GABAPENTIN 800 MG/1
1 TABLET ORAL
Qty: 30 | Refills: 0
Start: 2025-01-23 | End: 2025-02-21

## 2025-01-23 RX ORDER — QUETIAPINE FUMARATE 300 MG/1
2 TABLET ORAL
Qty: 60 | Refills: 0
Start: 2025-01-23 | End: 2025-02-21

## 2025-01-23 RX ADMIN — QUETIAPINE FUMARATE 800 MILLIGRAM(S): 300 TABLET ORAL at 20:17

## 2025-01-23 RX ADMIN — Medication 2 MILLIGRAM(S): at 02:36

## 2025-01-23 RX ADMIN — Medication 1 MILLIGRAM(S): at 21:25

## 2025-01-23 RX ADMIN — LAMOTRIGINE 375 MILLIGRAM(S): 100 TABLET ORAL at 08:22

## 2025-01-23 RX ADMIN — GABAPENTIN 800 MILLIGRAM(S): 800 TABLET ORAL at 20:20

## 2025-01-23 RX ADMIN — Medication 900 MILLIGRAM(S): at 20:16

## 2025-01-23 NOTE — BH DISCHARGE NOTE NURSING/SOCIAL WORK/PSYCH REHAB - PATIENT PORTAL LINK FT
You can access the FollowMyHealth Patient Portal offered by [facility] by registering at the following website: [email or web address]. By joining [facility]’s FollowMyHealth portal, you will also be able to view your health information using other applications (apps) compatible with our system.

## 2025-01-23 NOTE — BH INPATIENT PSYCHIATRY PROGRESS NOTE - NSBHMETABOLIC_PSY_ALL_CORE_FT
BMI: BMI (kg/m2): 25.8 (01-17-25 @ 04:33)  HbA1c: A1C with Estimated Average Glucose Result: 4.7 % (01-21-25 @ 09:18)    Glucose:   BP: --Vital Signs Last 24 Hrs  T(C): 36.7 (01-21-25 @ 08:37), Max: 37.6 (01-20-25 @ 18:52)  T(F): 98.1 (01-21-25 @ 08:37), Max: 99.7 (01-20-25 @ 18:52)  HR: --  BP: --  BP(mean): --  RR: --  SpO2: --    Orthostatic VS  01-21-25 @ 08:37  Lying BP: --/-- HR: --  Sitting BP: 104/78 HR: 104  Standing BP: 116/88 HR: 111  Site: --  Mode: --  Orthostatic VS  01-20-25 @ 18:52  Lying BP: --/-- HR: --  Sitting BP: 140/89 HR: 96  Standing BP: 131/81 HR: 96  Site: --  Mode: --  Orthostatic VS  01-20-25 @ 06:47  Lying BP: --/-- HR: --  Sitting BP: 123/88 HR: 108  Standing BP: 119/80 HR: 117  Site: --  Mode: --  Orthostatic VS  01-19-25 @ 18:43  Lying BP: --/-- HR: --  Sitting BP: 138/86 HR: 85  Standing BP: 136/90 HR: 90  Site: --  Mode: --    Lipid Panel: Date/Time: 01-21-25 @ 09:18  Cholesterol, Serum: 180  LDL Cholesterol Calculated: 117  HDL Cholesterol, Serum: 51  Total Cholesterol/HDL Ration Measurement: --  Triglycerides, Serum: 60  
BMI: BMI (kg/m2): 25.8 (01-17-25 @ 04:33)  HbA1c: A1C with Estimated Average Glucose Result: 4.7 % (01-21-25 @ 09:18)    Glucose:   BP: --Vital Signs Last 24 Hrs  T(C): 37.1 (01-23-25 @ 08:26), Max: 37.1 (01-23-25 @ 08:26)  T(F): 98.7 (01-23-25 @ 08:26), Max: 98.7 (01-23-25 @ 08:26)  HR: --  BP: --  BP(mean): --  RR: --  SpO2: --    Orthostatic VS  01-23-25 @ 08:26  Lying BP: --/-- HR: --  Sitting BP: 136/73 HR: 85  Standing BP: 133/72 HR: 92  Site: --  Mode: --  Orthostatic VS  01-22-25 @ 07:45  Lying BP: --/-- HR: --  Sitting BP: 114/68 HR: 89  Standing BP: 100/70 HR: 97  Site: --  Mode: --    Lipid Panel: Date/Time: 01-21-25 @ 09:18  Cholesterol, Serum: 180  LDL Cholesterol Calculated: 117  HDL Cholesterol, Serum: 51  Total Cholesterol/HDL Ration Measurement: --  Triglycerides, Serum: 60  
BMI: BMI (kg/m2): 25.8 (01-17-25 @ 04:33)  HbA1c: A1C with Estimated Average Glucose Result: 4.7 % (01-21-25 @ 09:18)    Glucose:   BP: --Vital Signs Last 24 Hrs  T(C): 36.2 (01-22-25 @ 07:45), Max: 36.2 (01-22-25 @ 07:45)  T(F): 97.1 (01-22-25 @ 07:45), Max: 97.1 (01-22-25 @ 07:45)  HR: --  BP: --  BP(mean): --  RR: --  SpO2: --    Orthostatic VS  01-22-25 @ 07:45  Lying BP: --/-- HR: --  Sitting BP: 114/68 HR: 89  Standing BP: 100/70 HR: 97  Site: --  Mode: --  Orthostatic VS  01-21-25 @ 08:37  Lying BP: --/-- HR: --  Sitting BP: 104/78 HR: 104  Standing BP: 116/88 HR: 111  Site: --  Mode: --  Orthostatic VS  01-20-25 @ 18:52  Lying BP: --/-- HR: --  Sitting BP: 140/89 HR: 96  Standing BP: 131/81 HR: 96  Site: --  Mode: --    Lipid Panel: Date/Time: 01-21-25 @ 09:18  Cholesterol, Serum: 180  LDL Cholesterol Calculated: 117  HDL Cholesterol, Serum: 51  Total Cholesterol/HDL Ration Measurement: --  Triglycerides, Serum: 60  
BMI: BMI (kg/m2): 25.8 (01-17-25 @ 04:33)  HbA1c:   Glucose:   BP: --Vital Signs Last 24 Hrs  T(C): 37.1 (01-19-25 @ 08:22), Max: 37.1 (01-19-25 @ 08:22)  T(F): 98.8 (01-19-25 @ 08:22), Max: 98.8 (01-19-25 @ 08:22)  HR: --  BP: --  BP(mean): --  RR: --  SpO2: --    Orthostatic VS  01-19-25 @ 08:22  Lying BP: --/-- HR: --  Sitting BP: 124/79 HR: 90  Standing BP: 122/72 HR: 92  Site: --  Mode: --  Orthostatic VS  01-18-25 @ 18:33  Lying BP: --/-- HR: --  Sitting BP: 136/78 HR: 89  Standing BP: 131/78 HR: 87  Site: --  Mode: --  Orthostatic VS  01-18-25 @ 08:24  Lying BP: --/-- HR: --  Sitting BP: 112/70 HR: 86  Standing BP: 91/66 HR: 96  Site: --  Mode: --    Lipid Panel: 
BMI: BMI (kg/m2): 25.8 (01-17-25 @ 04:33)  HbA1c: A1C with Estimated Average Glucose Result: 4.7 % (01-21-25 @ 09:18)    Glucose:   BP: --Vital Signs Last 24 Hrs  T(C): 37.1 (01-23-25 @ 08:26), Max: 37.1 (01-23-25 @ 08:26)  T(F): 98.7 (01-23-25 @ 08:26), Max: 98.7 (01-23-25 @ 08:26)  HR: --  BP: --  BP(mean): --  RR: --  SpO2: --    Orthostatic VS  01-23-25 @ 08:26  Lying BP: --/-- HR: --  Sitting BP: 136/73 HR: 85  Standing BP: 133/72 HR: 92  Site: --  Mode: --  Orthostatic VS  01-22-25 @ 07:45  Lying BP: --/-- HR: --  Sitting BP: 114/68 HR: 89  Standing BP: 100/70 HR: 97  Site: --  Mode: --    Lipid Panel: Date/Time: 01-21-25 @ 09:18  Cholesterol, Serum: 180  LDL Cholesterol Calculated: 117  HDL Cholesterol, Serum: 51  Total Cholesterol/HDL Ration Measurement: --  Triglycerides, Serum: 60  
BMI: BMI (kg/m2): 25.8 (01-17-25 @ 04:33)  HbA1c:   Glucose:   BP: --Vital Signs Last 24 Hrs  T(C): 37 (01-18-25 @ 08:24), Max: 37 (01-18-25 @ 08:24)  T(F): 98.6 (01-18-25 @ 08:24), Max: 98.6 (01-18-25 @ 08:24)  HR: --  BP: --  BP(mean): --  RR: --  SpO2: --    Orthostatic VS  01-18-25 @ 08:24  Lying BP: --/-- HR: --  Sitting BP: 112/70 HR: 86  Standing BP: 91/66 HR: 96  Site: --  Mode: --  Orthostatic VS  01-17-25 @ 04:33  Lying BP: --/-- HR: --  Sitting BP: 120/71 HR: 108  Standing BP: 132/85 HR: 92  Site: upper left arm  Mode: electronic    Lipid Panel: 
BMI: BMI (kg/m2): 25.8 (01-17-25 @ 04:33)  HbA1c:   Glucose:   BP: --Vital Signs Last 24 Hrs  T(C): 37 (01-20-25 @ 06:47), Max: 37 (01-20-25 @ 06:47)  T(F): 98.6 (01-20-25 @ 06:47), Max: 98.6 (01-20-25 @ 06:47)  HR: --  BP: --  BP(mean): --  RR: --  SpO2: --    Orthostatic VS  01-20-25 @ 06:47  Lying BP: --/-- HR: --  Sitting BP: 123/88 HR: 108  Standing BP: 119/80 HR: 117  Site: --  Mode: --  Orthostatic VS  01-19-25 @ 18:43  Lying BP: --/-- HR: --  Sitting BP: 138/86 HR: 85  Standing BP: 136/90 HR: 90  Site: --  Mode: --  Orthostatic VS  01-19-25 @ 08:22  Lying BP: --/-- HR: --  Sitting BP: 124/79 HR: 90  Standing BP: 122/72 HR: 92  Site: --  Mode: --  Orthostatic VS  01-18-25 @ 18:33  Lying BP: --/-- HR: --  Sitting BP: 136/78 HR: 89  Standing BP: 131/78 HR: 87  Site: --  Mode: --    Lipid Panel:
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
## 2025-01-23 NOTE — BH TREATMENT PLAN - NSTXMANICINTERRN_PSY_ALL_CORE
Provide quiet enviroment  Monitor for signs of aggression  Assess pt sleep habits, energy levels, grandiosity, irritability, etc.  Educate patient on medication indications and side effects  If applicable - observe for signs of lithium toxicity (nausea, vomiting, diarrhea, drowsiness, muscle weakness, tremors, lack of coordination, blurred vision)

## 2025-01-23 NOTE — BH INPATIENT PSYCHIATRY PROGRESS NOTE - NSBHASSESSSUMMFT_PSY_ALL_CORE
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      See admission note 01/17 for extensive assessment on day of admission.     Today, patient continues to maintain his marked improvements in rony sx. The patient is still calm and euthymic. Speech rate and productivity has decreased.  Patient also w/improving tolerability to Lithium. Will c/w Lithium Eskalith  mg qHS for now and obtain a level prior to discharge. Will change PRN regimen from Ativan to gabapentin as per patient's request and in an effort to limit benzos (also per patient's request). Referral placed for [facility] bipolar clinic, which is approved, but the date of the appointment is still pending.    Plan:  1. Legal: Admitted on/continue 9.27 status  2. Safety: No reported SI/SIB/HI/VI currently on unit; continue routine observation.  - Haldol PRN for severe agitation  3. Psychiatric:  - c/w Lithium Eskalith  mg qHS. Will obtain level prior to d/c; R/B/A and side effects discussed  	-Level of 0.4 at 600mg  - c/w home Lamictal 375 mg qd, Seroquel 800 qHS, gabapentin 800 qHS. Changed to gabapentin 600mg qHS PRN for anxiety.  4. Therapy: group & milieu therapy  5. Medical: No acute medical needs identified  6. Collateral: Collateral from wife and primary psychiatrist in  Chart note 01/17 AND in ED assessment 01/16  7. 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      See admission note 01/17 for extensive assessment on day of admission.     Today, patient continues to maintain his marked improvements in rony sx. The patient is still calm and euthymic. Speech rate and productivity has decreased.  Patient also w/improving tolerability to Lithium. Will c/w Lithium Eskalith  mg qHS for now and obtain a level prior to discharge. Will change PRN regimen from Ativan to gabapentin as per patient's request and in an effort to limit benzos (also per patient's request). Referral placed for [facility] bipolar clinic, which is approved, but the date of the appointment is still pending.    Plan:  1. Legal: Admitted on/continue 9.27 status  2. Safety: No reported SI/SIB/HI/VI currently on unit; continue routine observation.  - Haldol PRN for severe agitation  3. Psychiatric:  - c/w Lithium Eskalith  mg qHS. Will obtain level prior to d/c; R/B/A and side effects discussed  	-Level of 0.4 at 600mg  	-Repeat level 1/24  - c/w home Lamictal 375 mg qd, Seroquel 800 qHS, gabapentin 800 qHS. Changed to gabapentin 600mg qHS PRN for anxiety.  4. Therapy: group & milieu therapy  5. Medical: No acute medical needs identified  6. Collateral: Collateral from wife and primary psychiatrist in  Chart note 01/17 AND in ED assessment 01/16  7. Disposition: When stable/pending clinical improvement, referred to [facility] bipolar clinic

## 2025-01-23 NOTE — BH TREATMENT PLAN - NSTXPSYCHOGOAL_PSY_ALL_CORE
Will identify 2 coping skills that assist with focus on reality
Will verbalize decreased distress related to psychosis to 2 on a 10-point scale

## 2025-01-23 NOTE — BH INPATIENT PSYCHIATRY PROGRESS NOTE - NSTXMANICINTERMD_PSY_ALL_CORE
Med managemetn with lithium, seroquel

## 2025-01-23 NOTE — BH INPATIENT PSYCHIATRY PROGRESS NOTE - NSBHATTESTTYPEVISIT_PSY_A_CORE
SHAHRZAD without on-site Attending supervision
Attending with Resident/Fellow/Student

## 2025-01-23 NOTE — BH INPATIENT PSYCHIATRY PROGRESS NOTE - NSBHFUPINTERVALHXFT_PSY_A_CORE
Chart reviewed. Case discussed with interdisciplinary team. Patient seen and examined. No acute events overnight. Adherent to standing medications. Provided w/Ativan 2 mg PRN last night for anxiety and insomnia. VSS.     Today, patient continues to feel well and reports that he "is not fully at baseline, but close now." He also comments that his wife has told him that he "is much better." He maintains that he has not experienced any additional nausea or vomiting. He is still experiencing dry mouth however it has improved w/oral hydration. He feels like his mood is more level and he continues to deny experiencing racing thoughts. Has been sleeping well at night, corroborated by sleep log. He is still looking forward to discharge, being connected w/bipolar clinic, and finishing his move to his new residence. He did note that he felt anxious last night due to commotion on the unit (though he denies being involved and instead walked away from the commotion). No SIIP or HIIP. No AVH. Regarding his regimen, he does endorse wanting to stop having the PRN Ativan 2mg available and instead to replace that with gabapentin 600mg qHS PRN for sleep and insomnia. He notes that he has been trying to taper off Ativan on the outpatient setting and would like to continue this goal. He endorsed feeling safe to go home and is amenable to a tentative discharge date of tomorrow should the Bipolar Clinic offer an appointment date that falls close to tomorrow. Chart reviewed. Case discussed with interdisciplinary team. Patient seen and examined. No acute events overnight. Adherent to standing medications. Provided w/Ativan 2 mg PRN last night for anxiety and insomnia. VSS.     Today, patient continues to feel well and reports that he "is not fully at baseline, but close now." He also comments that his wife has told him that he "is much better." He maintains that he has not experienced any additional nausea or vomiting. He is still experiencing dry mouth however it has improved w/oral hydration. He feels like his mood is more level and he continues to deny experiencing racing thoughts. Has been sleeping well at night, corroborated by sleep log (though went to bed late last night due to issues on the unit). He is still looking forward to discharge, being connected w/bipolar clinic, and finishing his move to his new residence. He did note that he felt anxious last night due to commotion on the unit (though he denies being involved and instead walked away from the commotion). No SIIP or HIIP. No AVH. Regarding his regimen, he does endorse wanting to stop having the PRN Ativan 2mg available and instead to replace that with gabapentin 600mg qHS PRN for sleep and insomnia. He notes that he has been trying to taper off Ativan on the outpatient setting and would like to continue this goal. He endorsed feeling safe to go home and is amenable to a tentative discharge date of tomorrow should the Bipolar Clinic offer an appointment date that falls close to tomorrow.

## 2025-01-23 NOTE — BH INPATIENT PSYCHIATRY PROGRESS NOTE - NSBHATTESTBILLING_PSY_A_CORE
09288-Qimdjhzejn OBS or IP - moderate complexity OR 35-49 mins
99359-Hfyfyrdgmt OBS or IP - moderate complexity OR 35-49 mins
[id number]-Kybrtjcpik OBS or IP - moderate complexity OR 35-49 mins
[id number]-Udugwhalvm OBS or IP - moderate complexity OR 35-49 mins
[id number]-Pnkhpndtzm OBS or IP - moderate complexity OR 35-49 mins
[id number]-Brcqpvexdv OBS or IP - moderate complexity OR 35-49 mins

## 2025-01-23 NOTE — BH TREATMENT PLAN - NSTXVIOLNTGOAL_PSY_ALL_CORE
Will identify 2 situations that cause an aggressive response
Will identify 2 situations that cause an aggressive response

## 2025-01-23 NOTE — BH DISCHARGE NOTE NURSING/SOCIAL WORK/PSYCH REHAB - NSDCPRRECOMMEND_PSY_ALL_CORE
Psychiatric Rehabilitation staff recommends that patient continue to explore and utilize coping skills for improved symptom management and sustained recovery. Psychiatric Rehabilitation  staff recommends patient follow up with outpatient treatment for continued medication management, support and psychotherapy.

## 2025-01-23 NOTE — BH TREATMENT PLAN - NSTXMANICINTERPR_PSY_ALL_CORE
Psychiatric rehabilitation staff will provide encouragement, support, and psychoeducation to assist the patient in the progression of his  treatment goal.
Over the course of the current hospitalization, Patient met specified goal of exhibiting a substantial reduction in manic symptoms. Progress towards goal was evidenced by patient acting in a calm and cooperative way. A safety plan was completed prior to discharge

## 2025-01-23 NOTE — BH INPATIENT PSYCHIATRY PROGRESS NOTE - NSBHASSESSSUMMFT_PSY_ALL_CORE
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      See admission note 01/17 for extensive assessment on day of admission.     Today, patient exhibits marked improvements in mood & observed affect -- he is notably calmer, euthymic, no longer irritable. Speech rate has slowed down and no grandiosity was elicited today during interview. Patient also w/improving tolerability to Lithium. Will c/w Lithium Eskalith  mg qHS for now and obtain a level prior to discharge. Referral placed for [facility] bipolar clinic, pending.    Plan:  1. Legal: Admitted on/continue 9.27 status  2. Safety: No reported SI/SIB/HI/VI currently on unit; continue routine observation.  - Haldol/Ativan PRN medications for safety/agitation  3. Psychiatric:  - c/w Lithium Eskalith  mg qHS. Will obtain level prior to d/c; R/B/A and side effects discussed  	-Level of 0.4 at 600mg  - c/w home Lamictal 375 mg qd, Seroquel 800 qHS, gabapentin 800 qHS, Ativan 1 qHS  4. Therapy: group & milieu therapy  5. Medical: No acute medical needs identified  6. Collateral: Collateral from wife and primary psychiatrist in  Chart note 01/17 AND in ED assessment 01/16  7. Disposition: When stable/pending clinical improvement, referred to [facility] bipolar clinic

## 2025-01-23 NOTE — BH INPATIENT PSYCHIATRY PROGRESS NOTE - NSDCCRITERIA_PSY_ALL_CORE
pending clinical improvement

## 2025-01-23 NOTE — BH INPATIENT PSYCHIATRY PROGRESS NOTE - PRN MEDS
MEDICATIONS  (PRN):  benzocaine/menthol Lozenge 1 Lozenge Oral every 8 hours PRN sore throat  haloperidol     Tablet 5 milliGRAM(s) Oral every 6 hours PRN agitation  LORazepam     Tablet 2 milliGRAM(s) Oral every 6 hours PRN agitation  melatonin. 3 milliGRAM(s) Oral at bedtime PRN Insomnia  polyethylene glycol 3350 17 Gram(s) Oral daily PRN constipation  senna 2 Tablet(s) Oral at bedtime PRN obstipation  
MEDICATIONS  (PRN):  benzocaine/menthol Lozenge 1 Lozenge Oral every 8 hours PRN sore throat  gabapentin 600 milliGRAM(s) Oral at bedtime PRN anxiety  haloperidol     Tablet 5 milliGRAM(s) Oral every 6 hours PRN agitation  LORazepam     Tablet 2 milliGRAM(s) Oral every 6 hours PRN agitation  melatonin. 3 milliGRAM(s) Oral at bedtime PRN Insomnia  polyethylene glycol 3350 17 Gram(s) Oral daily PRN constipation  senna 2 Tablet(s) Oral at bedtime PRN obstipation  
MEDICATIONS  (PRN):  benzocaine/menthol Lozenge 1 Lozenge Oral every 8 hours PRN sore throat  haloperidol     Tablet 5 milliGRAM(s) Oral every 6 hours PRN agitation  LORazepam     Tablet 2 milliGRAM(s) Oral every 6 hours PRN agitation  melatonin. 3 milliGRAM(s) Oral at bedtime PRN Insomnia  polyethylene glycol 3350 17 Gram(s) Oral daily PRN constipation  senna 2 Tablet(s) Oral at bedtime PRN obstipation  
MEDICATIONS  (PRN):  haloperidol     Tablet 5 milliGRAM(s) Oral every 6 hours PRN agitation  LORazepam     Tablet 2 milliGRAM(s) Oral every 6 hours PRN agitation  melatonin. 3 milliGRAM(s) Oral at bedtime PRN Insomnia  polyethylene glycol 3350 17 Gram(s) Oral daily PRN constipation  senna 2 Tablet(s) Oral at bedtime PRN obstipation  
MEDICATIONS  (PRN):  haloperidol     Tablet 5 milliGRAM(s) Oral every 6 hours PRN agitation  LORazepam     Tablet 2 milliGRAM(s) Oral every 6 hours PRN agitation  melatonin. 3 milliGRAM(s) Oral at bedtime PRN Insomnia  polyethylene glycol 3350 17 Gram(s) Oral daily PRN constipation  senna 2 Tablet(s) Oral at bedtime PRN obstipation  
MEDICATIONS  (PRN):  benzocaine/menthol Lozenge 1 Lozenge Oral every 8 hours PRN sore throat  haloperidol     Tablet 5 milliGRAM(s) Oral every 6 hours PRN agitation  LORazepam     Tablet 2 milliGRAM(s) Oral every 6 hours PRN agitation  melatonin. 3 milliGRAM(s) Oral at bedtime PRN Insomnia  polyethylene glycol 3350 17 Gram(s) Oral daily PRN constipation  senna 2 Tablet(s) Oral at bedtime PRN obstipation  
MEDICATIONS  (PRN):  benzocaine/menthol Lozenge 1 Lozenge Oral every 8 hours PRN sore throat  haloperidol     Tablet 5 milliGRAM(s) Oral every 6 hours PRN agitation  LORazepam     Tablet 2 milliGRAM(s) Oral every 6 hours PRN agitation  melatonin. 3 milliGRAM(s) Oral at bedtime PRN Insomnia  polyethylene glycol 3350 17 Gram(s) Oral daily PRN constipation  senna 2 Tablet(s) Oral at bedtime PRN obstipation

## 2025-01-23 NOTE — BH INPATIENT PSYCHIATRY PROGRESS NOTE - CURRENT MEDICATION
MEDICATIONS  (STANDING):  gabapentin 800 milliGRAM(s) Oral at bedtime  haloperidol    Injectable 5 milliGRAM(s) IntraMuscular once  influenza   Vaccine 0.5 milliLiter(s) IntraMuscular once  lamoTRIgine 375 milliGRAM(s) Oral daily  lithium CR (ESKALITH-CR) 900 milliGRAM(s) Oral at bedtime  LORazepam   Injectable 2 milliGRAM(s) IntraMuscular once  QUEtiapine 800 milliGRAM(s) Oral at bedtime    MEDICATIONS  (PRN):  benzocaine/menthol Lozenge 1 Lozenge Oral every 8 hours PRN sore throat  gabapentin 600 milliGRAM(s) Oral at bedtime PRN anxiety  haloperidol     Tablet 5 milliGRAM(s) Oral every 6 hours PRN agitation  LORazepam     Tablet 2 milliGRAM(s) Oral every 6 hours PRN agitation  melatonin. 3 milliGRAM(s) Oral at bedtime PRN Insomnia  polyethylene glycol 3350 17 Gram(s) Oral daily PRN constipation  senna 2 Tablet(s) Oral at bedtime PRN obstipation  
MEDICATIONS  (STANDING):  gabapentin 800 milliGRAM(s) Oral at bedtime  haloperidol    Injectable 5 milliGRAM(s) IntraMuscular once  influenza   Vaccine 0.5 milliLiter(s) IntraMuscular once  lamoTRIgine 375 milliGRAM(s) Oral daily  lithium 600 milliGRAM(s) Oral at bedtime  LORazepam     Tablet 1 milliGRAM(s) Oral at bedtime  LORazepam   Injectable 2 milliGRAM(s) IntraMuscular once  QUEtiapine 800 milliGRAM(s) Oral at bedtime    MEDICATIONS  (PRN):  benzocaine/menthol Lozenge 1 Lozenge Oral every 8 hours PRN sore throat  haloperidol     Tablet 5 milliGRAM(s) Oral every 6 hours PRN agitation  LORazepam     Tablet 2 milliGRAM(s) Oral every 6 hours PRN agitation  melatonin. 3 milliGRAM(s) Oral at bedtime PRN Insomnia  polyethylene glycol 3350 17 Gram(s) Oral daily PRN constipation  senna 2 Tablet(s) Oral at bedtime PRN obstipation  
MEDICATIONS  (STANDING):  gabapentin 800 milliGRAM(s) Oral at bedtime  haloperidol    Injectable 5 milliGRAM(s) IntraMuscular once  influenza   Vaccine 0.5 milliLiter(s) IntraMuscular once  lamoTRIgine 375 milliGRAM(s) Oral daily  lithium 600 milliGRAM(s) Oral at bedtime  LORazepam     Tablet 1 milliGRAM(s) Oral at bedtime  LORazepam   Injectable 2 milliGRAM(s) IntraMuscular once  QUEtiapine 800 milliGRAM(s) Oral at bedtime    MEDICATIONS  (PRN):  haloperidol     Tablet 5 milliGRAM(s) Oral every 6 hours PRN agitation  LORazepam     Tablet 2 milliGRAM(s) Oral every 6 hours PRN agitation  melatonin. 3 milliGRAM(s) Oral at bedtime PRN Insomnia  polyethylene glycol 3350 17 Gram(s) Oral daily PRN constipation  senna 2 Tablet(s) Oral at bedtime PRN obstipation  
MEDICATIONS  (STANDING):  gabapentin 800 milliGRAM(s) Oral at bedtime  haloperidol    Injectable 5 milliGRAM(s) IntraMuscular once  influenza   Vaccine 0.5 milliLiter(s) IntraMuscular once  lamoTRIgine 375 milliGRAM(s) Oral daily  lithium CR (ESKALITH-CR) 900 milliGRAM(s) Oral at bedtime  LORazepam     Tablet 1 milliGRAM(s) Oral at bedtime  LORazepam   Injectable 2 milliGRAM(s) IntraMuscular once  QUEtiapine 800 milliGRAM(s) Oral at bedtime    MEDICATIONS  (PRN):  benzocaine/menthol Lozenge 1 Lozenge Oral every 8 hours PRN sore throat  haloperidol     Tablet 5 milliGRAM(s) Oral every 6 hours PRN agitation  LORazepam     Tablet 2 milliGRAM(s) Oral every 6 hours PRN agitation  melatonin. 3 milliGRAM(s) Oral at bedtime PRN Insomnia  polyethylene glycol 3350 17 Gram(s) Oral daily PRN constipation  senna 2 Tablet(s) Oral at bedtime PRN obstipation  
MEDICATIONS  (STANDING):  gabapentin 800 milliGRAM(s) Oral at bedtime  haloperidol    Injectable 5 milliGRAM(s) IntraMuscular once  influenza   Vaccine 0.5 milliLiter(s) IntraMuscular once  lamoTRIgine 375 milliGRAM(s) Oral daily  lithium 600 milliGRAM(s) Oral at bedtime  LORazepam     Tablet 1 milliGRAM(s) Oral at bedtime  LORazepam   Injectable 2 milliGRAM(s) IntraMuscular once  QUEtiapine 800 milliGRAM(s) Oral at bedtime    MEDICATIONS  (PRN):  haloperidol     Tablet 5 milliGRAM(s) Oral every 6 hours PRN agitation  LORazepam     Tablet 2 milliGRAM(s) Oral every 6 hours PRN agitation  melatonin. 3 milliGRAM(s) Oral at bedtime PRN Insomnia  polyethylene glycol 3350 17 Gram(s) Oral daily PRN constipation  senna 2 Tablet(s) Oral at bedtime PRN obstipation

## 2025-01-23 NOTE — BH INPATIENT PSYCHIATRY PROGRESS NOTE - NSICDXBHPRIMARYDX_PSY_ALL_CORE
Bipolar I disorder, most recent episode (or current) manic   F31.10  

## 2025-01-23 NOTE — BH INPATIENT PSYCHIATRY PROGRESS NOTE - NSBHCHARTREVIEWVS_PSY_A_CORE FT
Vital Signs Last 24 Hrs  T(C): 37 (01-18-25 @ 08:24), Max: 37 (01-18-25 @ 08:24)  T(F): 98.6 (01-18-25 @ 08:24), Max: 98.6 (01-18-25 @ 08:24)  HR: --  BP: --  BP(mean): --  RR: --  SpO2: --    Orthostatic VS  01-18-25 @ 08:24  Lying BP: --/-- HR: --  Sitting BP: 112/70 HR: 86  Standing BP: 91/66 HR: 96  Site: --  Mode: --  Orthostatic VS  01-17-25 @ 04:33  Lying BP: --/-- HR: --  Sitting BP: 120/71 HR: 108  Standing BP: 132/85 HR: 92  Site: upper left arm  Mode: electronic  
Vital Signs Last 24 Hrs  T(C): 36.7 (01-21-25 @ 08:37), Max: 37.6 (01-20-25 @ 18:52)  T(F): 98.1 (01-21-25 @ 08:37), Max: 99.7 (01-20-25 @ 18:52)  HR: --  BP: --  BP(mean): --  RR: --  SpO2: --    Orthostatic VS  01-21-25 @ 08:37  Lying BP: --/-- HR: --  Sitting BP: 104/78 HR: 104  Standing BP: 116/88 HR: 111  Site: --  Mode: --  Orthostatic VS  01-20-25 @ 18:52  Lying BP: --/-- HR: --  Sitting BP: 140/89 HR: 96  Standing BP: 131/81 HR: 96  Site: --  Mode: --  Orthostatic VS  01-20-25 @ 06:47  Lying BP: --/-- HR: --  Sitting BP: 123/88 HR: 108  Standing BP: 119/80 HR: 117  Site: --  Mode: --  Orthostatic VS  01-19-25 @ 18:43  Lying BP: --/-- HR: --  Sitting BP: 138/86 HR: 85  Standing BP: 136/90 HR: 90  Site: --  Mode: --  
Vital Signs Last 24 Hrs  T(C): 37.1 (01-23-25 @ 08:26), Max: 37.1 (01-23-25 @ 08:26)  T(F): 98.7 (01-23-25 @ 08:26), Max: 98.7 (01-23-25 @ 08:26)  HR: --  BP: --  BP(mean): --  RR: --  SpO2: --    Orthostatic VS  01-23-25 @ 08:26  Lying BP: --/-- HR: --  Sitting BP: 136/73 HR: 85  Standing BP: 133/72 HR: 92  Site: --  Mode: --  Orthostatic VS  01-22-25 @ 07:45  Lying BP: --/-- HR: --  Sitting BP: 114/68 HR: 89  Standing BP: 100/70 HR: 97  Site: --  Mode: --  
Vital Signs Last 24 Hrs  T(C): 37.1 (01-19-25 @ 08:22), Max: 37.1 (01-19-25 @ 08:22)  T(F): 98.8 (01-19-25 @ 08:22), Max: 98.8 (01-19-25 @ 08:22)  HR: --  BP: --  BP(mean): --  RR: --  SpO2: --    Orthostatic VS  01-19-25 @ 08:22  Lying BP: --/-- HR: --  Sitting BP: 124/79 HR: 90  Standing BP: 122/72 HR: 92  Site: --  Mode: --  Orthostatic VS  01-18-25 @ 18:33  Lying BP: --/-- HR: --  Sitting BP: 136/78 HR: 89  Standing BP: 131/78 HR: 87  Site: --  Mode: --  Orthostatic VS  01-18-25 @ 08:24  Lying BP: --/-- HR: --  Sitting BP: 112/70 HR: 86  Standing BP: 91/66 HR: 96  Site: --  Mode: --  
Vital Signs Last 24 Hrs  T(C): 36.2 (01-22-25 @ 07:45), Max: 36.2 (01-22-25 @ 07:45)  T(F): 97.1 (01-22-25 @ 07:45), Max: 97.1 (01-22-25 @ 07:45)  HR: --  BP: --  BP(mean): --  RR: --  SpO2: --    Orthostatic VS  01-22-25 @ 07:45  Lying BP: --/-- HR: --  Sitting BP: 114/68 HR: 89  Standing BP: 100/70 HR: 97  Site: --  Mode: --  Orthostatic VS  01-21-25 @ 08:37  Lying BP: --/-- HR: --  Sitting BP: 104/78 HR: 104  Standing BP: 116/88 HR: 111  Site: --  Mode: --  Orthostatic VS  01-20-25 @ 18:52  Lying BP: --/-- HR: --  Sitting BP: 140/89 HR: 96  Standing BP: 131/81 HR: 96  Site: --  Mode: --  
Vital Signs Last 24 Hrs  T(C): 37.1 (01-23-25 @ 08:26), Max: 37.1 (01-23-25 @ 08:26)  T(F): 98.7 (01-23-25 @ 08:26), Max: 98.7 (01-23-25 @ 08:26)  HR: --  BP: --  BP(mean): --  RR: --  SpO2: --    Orthostatic VS  01-23-25 @ 08:26  Lying BP: --/-- HR: --  Sitting BP: 136/73 HR: 85  Standing BP: 133/72 HR: 92  Site: --  Mode: --  Orthostatic VS  01-22-25 @ 07:45  Lying BP: --/-- HR: --  Sitting BP: 114/68 HR: 89  Standing BP: 100/70 HR: 97  Site: --  Mode: --  
Vital Signs Last 24 Hrs  T(C): 37 (01-20-25 @ 06:47), Max: 37 (01-20-25 @ 06:47)  T(F): 98.6 (01-20-25 @ 06:47), Max: 98.6 (01-20-25 @ 06:47)  HR: --  BP: --  BP(mean): --  RR: --  SpO2: --    Orthostatic VS  01-20-25 @ 06:47  Lying BP: --/-- HR: --  Sitting BP: 123/88 HR: 108  Standing BP: 119/80 HR: 117  Site: --  Mode: --  Orthostatic VS  01-19-25 @ 18:43  Lying BP: --/-- HR: --  Sitting BP: 138/86 HR: 85  Standing BP: 136/90 HR: 90  Site: --  Mode: --  Orthostatic VS  01-19-25 @ 08:22  Lying BP: --/-- HR: --  Sitting BP: 124/79 HR: 90  Standing BP: 122/72 HR: 92  Site: --  Mode: --  Orthostatic VS  01-18-25 @ 18:33  Lying BP: --/-- HR: --  Sitting BP: 136/78 HR: 89  Standing BP: 131/78 HR: 87  Site: --  Mode: --

## 2025-01-23 NOTE — BH DISCHARGE NOTE NURSING/SOCIAL WORK/PSYCH REHAB - NSBHDCADDR2FT_A_CORE
Please provide patient with GPS address: Patient must use Door # [phone] to enter the building  [facility]- 1st floor  [phone]  [facility] [address]

## 2025-01-23 NOTE — BH INPATIENT PSYCHIATRY PROGRESS NOTE - NSTXDCSOCGOAL_PSY_ALL_CORE
Will accept referrals to support groups, clubhouse, senior centers, etc.
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
## 2025-01-23 NOTE — BH TREATMENT PLAN - NSTXVIOLNTINTERRN_PSY_ALL_CORE
Promote calm environment  Recognize patient's unique triggers  Monitor patient carefully for signs of irritation/aggression  Maintain safe, clutter-free environment  Escalate aggression appropriately

## 2025-01-23 NOTE — BH INPATIENT PSYCHIATRY PROGRESS NOTE - NSBHFUPINTERVALCCFT_PSY_A_CORE
"I feel much better"
reported feeling "good". . 
"Can we talk about the lithium?"
"I am doing well."
reported feeling "pretty good". 
reported feeling "better, leveled.

## 2025-01-23 NOTE — BH TREATMENT PLAN - NSTXMANICGOAL_PSY_ALL_CORE
Demonstrate a substantial reduction in both hyperactive pacing on the unit and social intrusiveness
Other...

## 2025-01-23 NOTE — BH DISCHARGE NOTE NURSING/SOCIAL WORK/PSYCH REHAB - DISCHARGE INSTRUCTIONS AFTERCARE APPOINTMENTS
In order to check the location, date, or time of your aftercare appointment, please refer to your Discharge Instructions Document given to you upon leaving the hospital.  If you have lost the instructions please call [phone]

## 2025-01-23 NOTE — BH INPATIENT PSYCHIATRY PROGRESS NOTE - NSTXVIOLNTGOAL_PSY_ALL_CORE
Will identify 2 situations that cause an aggressive response

## 2025-01-23 NOTE — CHART NOTE - NSCHARTNOTEFT_GEN_A_CORE
Re- consult: Patient's request.     Source: Patient [X]    Family [ ]     other [ ]    Diet : Diet, Regular:   DASH/TLC {Sodium & Cholesterol Restricted} (DASH)  Corn Restricted  Egg Restricted (01-23-25 @ 11:19)        Patient reports [ ] nausea  [ ] vomiting [ ] diarrhea [ ] constipation  [ ]chewing problems [ ] swallowing issues  [x] other: None.      PO intake:  < 50% [ ] 50-75% [ ]   % [x]  other :    Pt reports eating well. No GI distress noted. Prefers lacto-ovo vegetarian diet for now. Pt is not allergic to eggs, corn. Pt can have foods that contain eggs and corn (pancakes, [place] toast, veggie burger, vegetarian chili) Food preferences explored and implemented.  Provided verbal education re: Lacto-ovo vegetarian diet, healthy eating and physical activity. Pt verbalized understanding.       Current Weight: 1/18 175 lbs (79.6 kg), 1/17 165 lbs (74.8 kg)    Pertinent Medications: MEDICATIONS  (STANDING):  gabapentin 800 milliGRAM(s) Oral at bedtime  haloperidol    Injectable 5 milliGRAM(s) IntraMuscular once  influenza   Vaccine 0.5 milliLiter(s) IntraMuscular once  lamoTRIgine 375 milliGRAM(s) Oral daily  lithium CR (ESKALITH-CR) 900 milliGRAM(s) Oral at bedtime  LORazepam   Injectable 2 milliGRAM(s) IntraMuscular once  QUEtiapine 800 milliGRAM(s) Oral at bedtime    MEDICATIONS  (PRN):  benzocaine/menthol Lozenge 1 Lozenge Oral every 8 hours PRN sore throat  gabapentin 600 milliGRAM(s) Oral at bedtime PRN anxiety  haloperidol     Tablet 5 milliGRAM(s) Oral every 6 hours PRN agitation  LORazepam     Tablet 2 milliGRAM(s) Oral every 6 hours PRN agitation  melatonin. 3 milliGRAM(s) Oral at bedtime PRN Insomnia  polyethylene glycol 3350 17 Gram(s) Oral daily PRN constipation  senna 2 Tablet(s) Oral at bedtime PRN obstipation    Pertinent Labs:  01-21  H    Skin: Intact.      Estimated Needs:   [x] no change since previous assessment  [ ] recalculated:       Previous Nutrition Diagnosis: None.      New Nutrition Diagnosis: [x] not applicable      Recommend    [x] Continue current diet    [ ] Nutrition Supplement    [ ] Nutrition Support    [x] Other: Honor food preferences.        Monitoring and Evaluation:     [x] PO intake [x] Tolerance to diet prescription [x] weights [x] follow up per protocol. Pt with possible discharge tomorrow. Recommended Pt to follow up with PCP for lipids.     [x] other: Leticia Hennessy RDN Pager [phone]

## 2025-01-23 NOTE — BH INPATIENT PSYCHIATRY PROGRESS NOTE - NSBHCONSBHPROVDETAILS_PSY_A_CORE  FT
Yes, see chart note 1/17

## 2025-01-23 NOTE — BH DISCHARGE NOTE NURSING/SOCIAL WORK/PSYCH REHAB - NSDCPRGOAL_PSY_ALL_CORE
Over the course of the current hospitalization, Psychiatric Rehabilitation Staff and patient discussed level of functioning, addressed concerns surrounding the hospitalization, discharge, engaged in skill development and safety planning. Patient maintained good behavioral control throughout the duration of his stay. Patient met specified goal of exhibiting a substantial reduction in manic symptoms. Progress towards goal was evidenced by patient acting in a calm and cooperative way. Throughout the course of the patient’s current hospitalization, the patient attended many of the Psychiatric Rehabilitation group programs and participated meaningfully. Patient completed a safety plan upon discharge. A copy of the safety plan was given upon discharge for reference.

## 2025-01-23 NOTE — BH TREATMENT PLAN - NSTXCAREGIVERPARTICIPATE_PSY_P_CORE
Family/Caregiver participated in identification of needs/problems/goals for treatment/Family/Caregiver participated in defining interventions/Family/Caregiver participated in development of after care plan
Family/Caregiver participated in identification of needs/problems/goals for treatment/Family/Caregiver participated in defining interventions

## 2025-01-24 VITALS — TEMPERATURE: 99 F

## 2025-01-24 LAB
ANION GAP SERPL CALC-SCNC: 9 MMOL/L — SIGNIFICANT CHANGE UP (ref 7–14)
BUN SERPL-MCNC: 8 MG/DL — SIGNIFICANT CHANGE UP (ref 7–23)
CALCIUM SERPL-MCNC: 9.5 MG/DL — SIGNIFICANT CHANGE UP (ref 8.4–10.5)
CHLORIDE SERPL-SCNC: 100 MMOL/L — SIGNIFICANT CHANGE UP (ref 98–107)
CO2 SERPL-SCNC: 27 MMOL/L — SIGNIFICANT CHANGE UP (ref 22–31)
CREAT SERPL-MCNC: 0.97 MG/DL — SIGNIFICANT CHANGE UP (ref 0.5–1.3)
EGFR: 95 ML/MIN/1.73M2 — SIGNIFICANT CHANGE UP
GLUCOSE SERPL-MCNC: 105 MG/DL — HIGH (ref 70–99)
LITHIUM SERPL-MCNC: 0.6 MMOL/L — SIGNIFICANT CHANGE UP (ref 0.6–1.2)
POTASSIUM SERPL-MCNC: 3.8 MMOL/L — SIGNIFICANT CHANGE UP (ref 3.5–5.3)
POTASSIUM SERPL-SCNC: 3.8 MMOL/L — SIGNIFICANT CHANGE UP (ref 3.5–5.3)
SODIUM SERPL-SCNC: 136 MMOL/L — SIGNIFICANT CHANGE UP (ref 135–145)

## 2025-01-24 PROCEDURE — 99239 HOSP IP/OBS DSCHRG MGMT >30: CPT

## 2025-01-24 RX ADMIN — LAMOTRIGINE 375 MILLIGRAM(S): 100 TABLET ORAL at 08:09

## 2025-01-24 NOTE — BH INPATIENT PSYCHIATRY DISCHARGE NOTE - NSBHMETABOLIC_PSY_ALL_CORE_FT
BMI: BMI (kg/m2): 25.8 (01-17-25 @ 04:33)  HbA1c: A1C with Estimated Average Glucose Result: 4.7 % (01-21-25 @ 09:18)    Glucose:   BP: --Vital Signs Last 24 Hrs  T(C): 37.2 (01-24-25 @ 07:15), Max: 37.2 (01-24-25 @ 07:15)  T(F): 99 (01-24-25 @ 07:15), Max: 99 (01-24-25 @ 07:15)  HR: --  BP: --  BP(mean): --  RR: --  SpO2: --    Orthostatic VS  01-24-25 @ 07:15  Lying BP: --/-- HR: --  Sitting BP: 124/89 HR: 88  Standing BP: 129/89 HR: 90  Site: --  Mode: electronic  Orthostatic VS  01-23-25 @ 08:26  Lying BP: --/-- HR: --  Sitting BP: 136/73 HR: 85  Standing BP: 133/72 HR: 92  Site: --  Mode: --    Lipid Panel: Date/Time: 01-21-25 @ 09:18  Cholesterol, Serum: 180  LDL Cholesterol Calculated: 117  HDL Cholesterol, Serum: 51  Total Cholesterol/HDL Ration Measurement: --  Triglycerides, Serum: 60

## 2025-01-24 NOTE — BH INPATIENT PSYCHIATRY DISCHARGE NOTE - NSBHDCRISKMITIGATE_PSY_ALL_CORE
Safety planning/DBT Program/Medications targeting suicidality/non-suicidal self injurious behavior Safety planning/Family/Other social support involvement/Medications targeting suicidality/non-suicidal self injurious behavior

## 2025-01-24 NOTE — BH INPATIENT PSYCHIATRY DISCHARGE NOTE - NSDCCPCAREPLAN_GEN_ALL_CORE_FT
PRINCIPAL DISCHARGE DIAGNOSIS  Diagnosis: Bipolar I disorder, most recent episode (or current) manic  Assessment and Plan of Treatment: - Continue taking Lithium Eskalith  mg nightly  - Continue taking home medications of gabapentin 800 mg daily, Latuda 375 mg daily, Seroquel 800 mg nightly, Ativan 1 mg nightly  - Follow up with your outpatient provider at [facility] for next steps  - Continue seeing your therapist     PRINCIPAL DISCHARGE DIAGNOSIS  Diagnosis: Bipolar I disorder, most recent episode (or current) manic  Assessment and Plan of Treatment: - Continue taking Lithium Eskalith  mg nightly. Ensure adequate hydration and avoid NSAIDs (e.g. ibuprofen, naproxen, aspirin) because it can interfere with your kidney's ability to clear the medication. Tylenol is okay.   - If you experience any tremors, dizziness, acute mental status changes, slurred speech, go to the ED IMMEDIATELY. This could be a sign of lithium toxicity.  - Continue taking home medications of gabapentin 800 mg daily, Latuda 375 mg daily, Seroquel 800 mg nightly, Ativan 1 mg nightly  - Follow up with your outpatient provider at [facility] for next steps  - Continue seeing your therapist
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
## 2025-01-24 NOTE — BH INPATIENT PSYCHIATRY DISCHARGE NOTE - NSBHASSESSSUMMFT_PSY_ALL_CORE
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      Today, ***    Patient is at chronically elevated risk for self-harm due to psychiatric illness, history of multiple psychiatric hospitalizations, history of substance use, unemployment, and history of a suicide attempt in the setting of psychosis. On admission, acute risk factors were rony, possible brief medication non-adherence, increased psychosocial stressors, and a recent change and dissatisfaction with his psychiatrist. Throughout hospitalization, these risk factors were addressed and mitigated by the introduction of lithium, connecting patient with more targeted outpatient treatment for psychiatric care, and providing psychoeducation about the utility of medication and therapy moving forward. While chronic risk factors remain, these are mitigated by protective factors of a strong support system, a strong therapeutic alliance with patient’s current therapist, residential stability, current sobriety, willingness to seek help, engagement in volunteer work, desire to live, and future-oriented thinking. Patient is therefore no longer at an acutely elevated risk of harm to self or others and is suitable to discharge to outpatient level of care.       Plan:  1. Patient to be discharged to home today with wife. Patient and family in agreement with discharge plan.   2. Continue psychiatric medications as follows: Lithium 900 mg qHS, gabapentin 800 mg qHS, Lamictal 375 mg qd, Ativan 1 mg qHS, and Seroquel 800 mg qHS.  30 day supply of medication provided.    3. Aftercare plan: [facility]  4. Medical: n/a  5. Safety plan reviewed and emergency procedures discussed including crisis clinic, ED and use of 911 for acute safety concerns. Patient and family aware of how to contact [facility]3 team.     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      Today, patient continues to exhibit improvements in mood, thought process and concentration, and sleep. Still has some modest residual sxs of rony (racing thoughts), however they are much improved from admission. Spoke to wife in person, she agrees he is closer to baseline and expresses no acute safety concerns. Lithium and BMP taken this AM show a level of 0.6 and creatinine of 0.97. Advised patient to obtain repeat lithium level during next outpatient appt to assess true level at steady state. Patient and wife expressed understanding in regard to lithium risks.     Patient is at chronically elevated risk for self-harm due to psychiatric illness, history of multiple psychiatric hospitalizations, history of substance use, unemployment, and history of a suicide attempt in the setting of psychosis. On admission, acute risk factors were rony, possible brief medication non-adherence, increased psychosocial stressors, and a recent change and dissatisfaction with his psychiatrist. Throughout hospitalization, these risk factors were addressed and mitigated by the introduction of lithium, connecting patient with more targeted outpatient treatment for psychiatric care, and providing psychoeducation about the utility of medication and therapy moving forward. While chronic risk factors remain, these are mitigated by protective factors of a strong support system, a strong therapeutic alliance with patient’s current therapist, residential stability, current sobriety, willingness to seek help, engagement in volunteer work, desire to live, and future-oriented thinking. Patient is therefore no longer at an acutely elevated risk of harm to self or others and is suitable to discharge to outpatient level of care.       Plan:  1. Patient to be discharged to home today with wife. Patient and family in agreement with discharge plan.   2. Continue psychiatric medications as follows: Lithium 900 mg qHS, gabapentin 800 mg qHS, Lamictal 375 mg qd, Ativan 1 mg qHS, and Seroquel 800 mg qHS.  30 day supply of medication provided.    3. Aftercare plan: [facility] crisis center 29-Jan-2025 09:30. Intake at [facility]: IN PERSON appointment on 03/04/25 @ 03:00 pm with Tali Francois & Cathy.  4. Medical: n/a  5. Safety plan reviewed and emergency procedures discussed including crisis clinic, ED and use of 911 for acute safety concerns. Patient and family aware of how to contact [facility]3 team.

## 2025-01-24 NOTE — BH INPATIENT PSYCHIATRY DISCHARGE NOTE - NSBHDCHANDOFFFT_PSY_ALL_CORE
provided via email to [facility], [facility]

## 2025-01-24 NOTE — BH INPATIENT PSYCHIATRY DISCHARGE NOTE - NSDCMRMEDTOKEN_GEN_ALL_CORE_FT
Ativan 1 mg oral tablet: 1 tab(s) orally once a day (at bedtime) MDD: 1mg  gabapentin 800 mg oral tablet: 1 tab(s) orally once a day (at bedtime)  LaMICtal 150 mg oral tablet: 2.5 tab(s) orally once a day Total of 375mg daily  lithium 450 mg oral tablet, extended release: 2 tab(s) orally once a day (at bedtime)  QUEtiapine 400 mg oral tablet: 2 tab(s) orally once a day (at bedtime)

## 2025-01-24 NOTE — BH INPATIENT PSYCHIATRY DISCHARGE NOTE - ATTENDING DISCHARGE PHYSICAL EXAMINATION:
Patient seen and evaluated with Dr. Machado. Patient remains with some residual symptoms on the hypomanic spectrum (talkative, over-inclusive), though overall significantly improved since admission, in very good behavioral control on the unit, and no safety concerns expressed by patient and wife. Li level 0.6 (but not yet at steady state, however aligns with prior level at 600mg). Provided recommendations on follow-up, needing of repeat Li level, and precautions while taking lithium.

## 2025-01-24 NOTE — BH INPATIENT PSYCHIATRY DISCHARGE NOTE - REASON FOR ADMISSION
You were hospitalized for acute rony, which put you at an increased risk of harm to self and others.

## 2025-01-24 NOTE — BH INPATIENT PSYCHIATRY DISCHARGE NOTE - NSBHFUPINTERVALHXFT_PSY_A_CORE
Chart reviewed. Case discussed with interdisciplinary team. Patient seen and examined. No acute events overnight. Adherent to standing medications. No PRNs required. VSS.  Chart reviewed. Case discussed with interdisciplinary team. Patient seen and examined. No acute events overnight. Adherent to standing medications. No PRNs required. VSS.     Today, patient is feeling well and ready to go home. Reports improving dry mouth. No further episode of nausea or emesis. No tremor. Still feels that he has "many thoughts" but continues to endorse improvements in concentration and mood. Has been sleeping well. Reports that he has been mindful of drinking enough water. Patient has plans to relax with his wife and dog today. Has written down goals and plans for the near future in his notebook, i.e. moving to his new house, getting back in touch w/clients on [employer]. Reviewed coping techniques w/patient in case stressors come up again. Reviewed today's lab results w/patient and provided further psychoeducation on lithium. Patient expressed understanding and is excited to begin tx with new outpatient provider. No AVH. No SIIP or HIIP.  Chart reviewed. Case discussed with interdisciplinary team. Patient seen and examined. No acute events overnight. Adherent to standing medications. No PRNs required. VSS.     Today, patient is feeling well and ready to go home. Reports improving dry mouth. No further episode of nausea or emesis. No tremor. Still feels that he has "many thoughts" but continues to endorse improvements in concentration and mood. Has been sleeping well. Reports that he has been mindful of drinking enough water. Patient has plans to relax with his wife and dog today. Has written down goals and plans for the near future in his notebook, i.e. moving to his new house, getting back in touch w/clients on [employer]. Reviewed coping techniques w/patient in case stressors come up again. Reviewed today's lab results w/patient and provided further psychoeducation on lithium. Patient expressed understanding and is excited to begin tx with new outpatient provider. No AVH. No SIIP or HIIP.     Spoke with patients' wife, who reported that despite some residual symptoms, patient is significantly improved, and she has no safety concerns re discharge.
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
## 2025-01-24 NOTE — BH INPATIENT PSYCHIATRY DISCHARGE NOTE - NSBHFUPINTERVALCCFT_PSY_A_CORE
Discharge Progress Note Date and Time: 01-24-25 @ 09:01  Discharge Progress Note Date and Time: 01-24-25 @ 09:01    "I feel better"

## 2025-01-27 NOTE — BH SOCIAL WORK INITIAL PSYCHOSOCIAL EVALUATION - NSBHHOUSE_PSY_ALL_CORE
Render In Strict Bullet Format?: No Initiate Treatment: - triamcinolone acetonide 0.1 % topical ointment Bid: Apply to affected itchy and rash area on trunk and extremities twice daily for up 2 weeks then use BID on weekends for maintenance.Apply Vaseline on top. Detail Level: Simple Home alone

## 2025-01-29 ENCOUNTER — OUTPATIENT (OUTPATIENT)
Dept: OUTPATIENT SERVICES | Facility: HOSPITAL | Age: 50
LOS: 1 days | Discharge: ANOTHER HOSPITAL | End: 2025-01-29
Payer: MEDICARE

## 2025-01-29 DIAGNOSIS — Z98.890 OTHER SPECIFIED POSTPROCEDURAL STATES: Chronic | ICD-10-CM

## 2025-01-29 PROCEDURE — 99214 OFFICE O/P EST MOD 30 MIN: CPT

## 2025-01-29 PROCEDURE — 90833 PSYTX W PT W E/M 30 MIN: CPT

## 2025-01-31 DIAGNOSIS — F31.73 BIPOLAR DISORDER, IN PARTIAL REMISSION, MOST RECENT EPISODE MANIC: ICD-10-CM

## 2025-02-07 PROCEDURE — 90833 PSYTX W PT W E/M 30 MIN: CPT

## 2025-02-07 PROCEDURE — 99214 OFFICE O/P EST MOD 30 MIN: CPT
